# Patient Record
Sex: FEMALE | Race: BLACK OR AFRICAN AMERICAN | Employment: PART TIME | URBAN - METROPOLITAN AREA
[De-identification: names, ages, dates, MRNs, and addresses within clinical notes are randomized per-mention and may not be internally consistent; named-entity substitution may affect disease eponyms.]

---

## 2019-06-23 ENCOUNTER — HOSPITAL ENCOUNTER (EMERGENCY)
Age: 39
Discharge: HOME | End: 2019-06-23
Payer: COMMERCIAL

## 2019-06-23 VITALS
SYSTOLIC BLOOD PRESSURE: 127 MMHG | OXYGEN SATURATION: 98 % | HEART RATE: 63 BPM | TEMPERATURE: 98.3 F | HEIGHT: 67 IN | DIASTOLIC BLOOD PRESSURE: 78 MMHG | WEIGHT: 217.59 LBS | RESPIRATION RATE: 16 BRPM | BODY MASS INDEX: 34.15 KG/M2

## 2019-06-23 DIAGNOSIS — L73.2 HIDRADENITIS AXILLARIS: Primary | ICD-10-CM

## 2019-06-23 PROCEDURE — 4500000022 HC ED LEVEL 2 PROCEDURE

## 2019-06-23 PROCEDURE — 99282 EMERGENCY DEPT VISIT SF MDM: CPT

## 2019-06-23 PROCEDURE — 6370000000 HC RX 637 (ALT 250 FOR IP): Performed by: NURSE PRACTITIONER

## 2019-06-23 RX ORDER — OXYCODONE HYDROCHLORIDE AND ACETAMINOPHEN 5; 325 MG/1; MG/1
2 TABLET ORAL ONCE
Status: COMPLETED | OUTPATIENT
Start: 2019-06-23 | End: 2019-06-23

## 2019-06-23 RX ORDER — DOXYCYCLINE HYCLATE 100 MG
100 TABLET ORAL ONCE
Status: COMPLETED | OUTPATIENT
Start: 2019-06-23 | End: 2019-06-23

## 2019-06-23 RX ORDER — NAPROXEN 500 MG/1
500 TABLET ORAL 2 TIMES DAILY
Qty: 60 TABLET | Refills: 0 | Status: SHIPPED | OUTPATIENT
Start: 2019-06-23 | End: 2019-12-23

## 2019-06-23 RX ORDER — CLINDAMYCIN PHOSPHATE 10 MG/G
GEL TOPICAL
Qty: 1 BOTTLE | Refills: 0 | Status: SHIPPED | OUTPATIENT
Start: 2019-06-23 | End: 2019-06-30

## 2019-06-23 RX ORDER — OXYCODONE HYDROCHLORIDE AND ACETAMINOPHEN 5; 325 MG/1; MG/1
1 TABLET ORAL EVERY 6 HOURS PRN
Qty: 6 TABLET | Refills: 0 | Status: SHIPPED | OUTPATIENT
Start: 2019-06-23 | End: 2019-06-26

## 2019-06-23 RX ORDER — DOXYCYCLINE 100 MG/1
100 TABLET ORAL 2 TIMES DAILY
Qty: 20 TABLET | Refills: 0 | Status: SHIPPED | OUTPATIENT
Start: 2019-06-23 | End: 2019-07-03

## 2019-06-23 RX ADMIN — DOXYCYCLINE HYCLATE 100 MG: 100 TABLET, COATED ORAL at 20:07

## 2019-06-23 RX ADMIN — OXYCODONE HYDROCHLORIDE AND ACETAMINOPHEN 2 TABLET: 5; 325 TABLET ORAL at 20:07

## 2019-06-23 ASSESSMENT — PAIN DESCRIPTION - ORIENTATION: ORIENTATION: LEFT

## 2019-06-23 ASSESSMENT — PAIN SCALES - GENERAL
PAINLEVEL_OUTOF10: 7

## 2019-06-23 ASSESSMENT — PAIN DESCRIPTION - LOCATION: LOCATION: ARM

## 2019-06-23 ASSESSMENT — PAIN - FUNCTIONAL ASSESSMENT: PAIN_FUNCTIONAL_ASSESSMENT: 0-10

## 2019-06-23 ASSESSMENT — PAIN DESCRIPTION - DESCRIPTORS: DESCRIPTORS: BURNING;ACHING

## 2019-06-23 ASSESSMENT — PAIN DESCRIPTION - PAIN TYPE: TYPE: ACUTE PAIN

## 2019-06-23 ASSESSMENT — PAIN DESCRIPTION - FREQUENCY: FREQUENCY: CONTINUOUS

## 2019-06-24 NOTE — ED PROVIDER NOTES
[facility] [address] [id number]  Dept: [phone]  Loc: [address] ENCOUNTER        This patient was not seen or evaluated by the attending physician. I evaluated this patient, the attending physician was available for consultation. CHIEF COMPLAINT    Chief Complaint   Patient presents with    Abscess     Started with one abscess under left axilla 1 week prior, draining, now has multiple abscess and are very painful. Denies drainage at this time. Denies any fevers       HPI    Alaina Medina is a 35 y.o. female who presents with an area of pain, redness, and swelling localized in the left axilla. Onset was 1 week ago, first noted 1 abscess which spontaneously opened and drained and has since resolved. She states however there have been multiple small abscesses surrounding the area of the original abscess. The duration has been constant since the onset. The quality is sharp. There are no alleviating factors. She denies any fevers or chills, nausea or vomiting. She states that she is not immunocompromise. States that she came to the ED for further evaluation and treatment. REVIEW OF SYSTEMS    Skin: Painful skin lesion as mentioned above  General: No fevers or chills    PAST MEDICAL & SURGICAL HISTORY    Past Medical History:   Diagnosis Date    Asthma      History reviewed. No pertinent surgical history. CURRENT MEDICATIONS  (may include discharge medications prescribed in the ED)  Current Outpatient Rx   Medication Sig Dispense Refill    doxycycline monohydrate (ADOXA) 100 MG tablet Take 1 tablet by mouth 2 times daily for 10 days May substitute another form of Doxycycline if insurance requires. 20 tablet 0    clindamycin (CLEOCIN-T) 1 % gel Place topically 2 times daily to left axilla.  1 Bottle 0    oxyCODONE-acetaminophen (PERCOCET) 5-325 MG per tablet Take 1 tablet by mouth every 6 hours as needed for Pain for up to 3 days. Intended supply: 3 days. Take lowest dose possible to manage pain 6 tablet 0    naproxen (NAPROSYN) 500 MG tablet Take 1 tablet by mouth 2 times daily 60 tablet 0       ALLERGIES    Allergies   Allergen Reactions    Codeine Hives    Pcn [Penicillins] Hives       SOCIAL & FAMILY HISTORY    Social History     Socioeconomic History    Marital status: Single     Spouse name: None    Number of children: None    Years of education: None    Highest education level: None   Occupational History    None   Social Needs    Financial resource strain: None    Food insecurity:     Worry: None     Inability: None    Transportation needs:     Medical: None     Non-medical: None   Tobacco Use    Smoking status: Never Smoker    Smokeless tobacco: Never Used   Substance and Sexual Activity    Alcohol use: Yes     Comment: socially    Drug use: No    Sexual activity: None   Lifestyle    Physical activity:     Days per week: None     Minutes per session: None    Stress: None   Relationships    Social connections:     Talks on phone: None     Gets together: None     Attends Judaism service: None     Active member of club or organization: None     Attends meetings of clubs or organizations: None     Relationship status: None    Intimate partner violence:     Fear of current or ex partner: None     Emotionally abused: None     Physically abused: None     Forced sexual activity: None   Other Topics Concern    None   Social History Narrative    None     History reviewed. No pertinent family history.     PHYSICAL EXAM    VITAL SIGNS: /78   Pulse 63   Temp 98.3 °F (36.8 °C) (Oral)   Resp 16   Ht 5' 7\" (1.702 m)   Wt 217 lb 9.5 oz (98.7 kg)   LMP 06/06/2019   SpO2 98%   BMI 34.08 kg/m²   Constitutional:  Well developed, well nourished, no acute distress  HENT:  Atraumatic, no facial or lip swelling  Oral:  No tongue swelling, airway patent  Neck: Supple, no swelling  Respiratory:  No respiratory distress, breathing comfortably  Cardiovascular:  no JVD   Musculoskeletal:  No edema, no acute deformities  Integument:  + 3 cm area of tenderness, induration, with no fluctuance located in the left axilla with surrounding erythema    RADIOLOGY  No orders to display       PROCEDURE  Incision and Drainage Procedure Note  Is not recommended at this time given the location and no specific fluctuance noted. ED COURSE & MEDICAL DECISION MAKING    See chart for details of medications prescribed. Vitals:    06/23/19 1946   BP: 127/78   Pulse: 63   Resp: 16   Temp: 98.3 °F (36.8 °C)   TempSrc: Oral   SpO2: 98%   Weight: 217 lb 9.5 oz (98.7 kg)   Height: 5' 7\" (1.702 m)       Differential diagnosis: necrotizing fasciitis, deep space soft tissue bacterial skin infection, viral rash, systemic infectious rash, aseptic cyst, malignancy, lymphadenopathy, other    Patient is afebrile and nontoxic in appearance. Doxycycline prescribed as well as clindamycin cream.    I instructed the patient to follow up in 2 days for wound recheck and referral to general surgeon. I instructed the patient to return to the ED immediately for any new or worsening symptoms. The patient verbalized understanding. FINAL IMPRESSION    1.  Hidradenitis axillaris        PLAN  Discharge with close outpatient follow-up (see EMR)       (Please note that this note was completed with a voice recognition program.  Every attempt was made to edit the dictations, but inevitably there remain words that are mis-transcribed.)        ANIKA Adams - ANETA  06/23/19 2014

## 2019-12-23 ENCOUNTER — HOSPITAL ENCOUNTER (EMERGENCY)
Age: 39
Discharge: HOME | End: 2019-12-23
Attending: EMERGENCY MEDICINE
Payer: COMMERCIAL

## 2019-12-23 ENCOUNTER — APPOINTMENT (OUTPATIENT)
Dept: CT IMAGING | Age: 39
End: 2019-12-23
Payer: COMMERCIAL

## 2019-12-23 VITALS
TEMPERATURE: 98.5 F | WEIGHT: 217.81 LBS | SYSTOLIC BLOOD PRESSURE: 117 MMHG | RESPIRATION RATE: 16 BRPM | BODY MASS INDEX: 34.19 KG/M2 | HEIGHT: 67 IN | HEART RATE: 71 BPM | OXYGEN SATURATION: 99 % | DIASTOLIC BLOOD PRESSURE: 71 MMHG

## 2019-12-23 DIAGNOSIS — K62.5 RECTAL BLEEDING: Primary | ICD-10-CM

## 2019-12-23 DIAGNOSIS — N30.01 ACUTE CYSTITIS WITH HEMATURIA: ICD-10-CM

## 2019-12-23 LAB
A/G RATIO: 1.3 (ref 1.1–2.2)
ALBUMIN SERPL-MCNC: 3.9 G/DL (ref 3.4–5)
ALP BLD-CCNC: 53 U/L (ref 40–129)
ALT SERPL-CCNC: 10 U/L (ref 10–40)
ANION GAP SERPL CALCULATED.3IONS-SCNC: 8 MMOL/L (ref 3–16)
AST SERPL-CCNC: 21 U/L (ref 15–37)
BACTERIA: ABNORMAL /HPF
BASOPHILS ABSOLUTE: 0.1 K/UL (ref 0–0.2)
BASOPHILS RELATIVE PERCENT: 0.7 %
BILIRUB SERPL-MCNC: 0.3 MG/DL (ref 0–1)
BILIRUBIN URINE: NEGATIVE
BLOOD, URINE: ABNORMAL
BUN BLDV-MCNC: 12 MG/DL (ref 7–20)
CALCIUM SERPL-MCNC: 9.3 MG/DL (ref 8.3–10.6)
CHLORIDE BLD-SCNC: 110 MMOL/L (ref 99–110)
CLARITY: ABNORMAL
CO2: 23 MMOL/L (ref 21–32)
COLOR: YELLOW
CREAT SERPL-MCNC: 0.9 MG/DL (ref 0.6–1.1)
EOSINOPHILS ABSOLUTE: 0.2 K/UL (ref 0–0.6)
EOSINOPHILS RELATIVE PERCENT: 2.5 %
EPITHELIAL CELLS, UA: ABNORMAL /HPF
GFR AFRICAN AMERICAN: >60
GFR NON-AFRICAN AMERICAN: >60
GLOBULIN: 3 G/DL
GLUCOSE BLD-MCNC: 99 MG/DL (ref 70–99)
GLUCOSE URINE: NEGATIVE MG/DL
HCG(URINE) PREGNANCY TEST: NEGATIVE
HCT VFR BLD CALC: 34.9 % (ref 36–48)
HEMOGLOBIN: 11.7 G/DL (ref 12–16)
KETONES, URINE: NEGATIVE MG/DL
LEUKOCYTE ESTERASE, URINE: ABNORMAL
LIPASE: 27 U/L (ref 13–60)
LYMPHOCYTES ABSOLUTE: 1.8 K/UL (ref 1–5.1)
LYMPHOCYTES RELATIVE PERCENT: 21.6 %
MCH RBC QN AUTO: 33.2 PG (ref 26–34)
MCHC RBC AUTO-ENTMCNC: 33.6 G/DL (ref 31–36)
MCV RBC AUTO: 98.8 FL (ref 80–100)
MICROSCOPIC EXAMINATION: YES
MONOCYTES ABSOLUTE: 1 K/UL (ref 0–1.3)
MONOCYTES RELATIVE PERCENT: 11.1 %
NEUTROPHILS ABSOLUTE: 5.5 K/UL (ref 1.7–7.7)
NEUTROPHILS RELATIVE PERCENT: 64.1 %
NITRITE, URINE: NEGATIVE
OCCULT BLOOD DIAGNOSTIC: ABNORMAL
PDW BLD-RTO: 13.4 % (ref 12.4–15.4)
PH UA: 7.5 (ref 5–8)
PLATELET # BLD: 233 K/UL (ref 135–450)
PMV BLD AUTO: 8.4 FL (ref 5–10.5)
POTASSIUM REFLEX MAGNESIUM: 4.1 MMOL/L (ref 3.5–5.1)
PROTEIN UA: ABNORMAL MG/DL
RBC # BLD: 3.53 M/UL (ref 4–5.2)
RBC UA: ABNORMAL /HPF (ref 0–2)
SODIUM BLD-SCNC: 141 MMOL/L (ref 136–145)
SPECIFIC GRAVITY UA: 1.02 (ref 1–1.03)
TOTAL PROTEIN: 6.9 G/DL (ref 6.4–8.2)
TRICHOMONAS: ABNORMAL /HPF
URINE REFLEX TO CULTURE: YES
URINE TYPE: ABNORMAL
UROBILINOGEN, URINE: 1 E.U./DL
WBC # BLD: 8.6 K/UL (ref 4–11)
WBC UA: >100 /HPF (ref 0–5)

## 2019-12-23 PROCEDURE — 87086 URINE CULTURE/COLONY COUNT: CPT

## 2019-12-23 PROCEDURE — 84703 CHORIONIC GONADOTROPIN ASSAY: CPT

## 2019-12-23 PROCEDURE — 96361 HYDRATE IV INFUSION ADD-ON: CPT

## 2019-12-23 PROCEDURE — 6360000004 HC RX CONTRAST MEDICATION: Performed by: EMERGENCY MEDICINE

## 2019-12-23 PROCEDURE — G0328 FECAL BLOOD SCRN IMMUNOASSAY: HCPCS

## 2019-12-23 PROCEDURE — 85025 COMPLETE CBC W/AUTO DIFF WBC: CPT

## 2019-12-23 PROCEDURE — 6370000000 HC RX 637 (ALT 250 FOR IP): Performed by: EMERGENCY MEDICINE

## 2019-12-23 PROCEDURE — 83690 ASSAY OF LIPASE: CPT

## 2019-12-23 PROCEDURE — 96374 THER/PROPH/DIAG INJ IV PUSH: CPT

## 2019-12-23 PROCEDURE — 74177 CT ABD & PELVIS W/CONTRAST: CPT

## 2019-12-23 PROCEDURE — 81001 URINALYSIS AUTO W/SCOPE: CPT

## 2019-12-23 PROCEDURE — 36415 COLL VENOUS BLD VENIPUNCTURE: CPT

## 2019-12-23 PROCEDURE — 2580000003 HC RX 258: Performed by: EMERGENCY MEDICINE

## 2019-12-23 PROCEDURE — 6360000002 HC RX W HCPCS: Performed by: EMERGENCY MEDICINE

## 2019-12-23 PROCEDURE — 99284 EMERGENCY DEPT VISIT MOD MDM: CPT

## 2019-12-23 PROCEDURE — 80053 COMPREHEN METABOLIC PANEL: CPT

## 2019-12-23 RX ORDER — CIPROFLOXACIN 500 MG/1
500 TABLET, FILM COATED ORAL ONCE
Status: COMPLETED | OUTPATIENT
Start: 2019-12-23 | End: 2019-12-23

## 2019-12-23 RX ORDER — ONDANSETRON 4 MG/1
4 TABLET, ORALLY DISINTEGRATING ORAL EVERY 8 HOURS PRN
Qty: 20 TABLET | Refills: 0 | Status: ON HOLD | OUTPATIENT
Start: 2019-12-23 | End: 2022-04-10

## 2019-12-23 RX ORDER — 0.9 % SODIUM CHLORIDE 0.9 %
500 INTRAVENOUS SOLUTION INTRAVENOUS ONCE
Status: COMPLETED | OUTPATIENT
Start: 2019-12-23 | End: 2019-12-23

## 2019-12-23 RX ORDER — ONDANSETRON 2 MG/ML
4 INJECTION INTRAMUSCULAR; INTRAVENOUS ONCE
Status: COMPLETED | OUTPATIENT
Start: 2019-12-23 | End: 2019-12-23

## 2019-12-23 RX ORDER — DICYCLOMINE HYDROCHLORIDE 10 MG/1
10 CAPSULE ORAL EVERY 6 HOURS PRN
Qty: 20 CAPSULE | Refills: 0 | Status: ON HOLD | OUTPATIENT
Start: 2019-12-23 | End: 2022-04-10

## 2019-12-23 RX ORDER — CIPROFLOXACIN 500 MG/1
500 TABLET, FILM COATED ORAL 2 TIMES DAILY
Qty: 14 TABLET | Refills: 0 | Status: SHIPPED | OUTPATIENT
Start: 2019-12-23 | End: 2019-12-30

## 2019-12-23 RX ADMIN — SODIUM CHLORIDE 500 ML: 9 INJECTION, SOLUTION INTRAVENOUS at 21:36

## 2019-12-23 RX ADMIN — IOVERSOL 100 ML: 678 INJECTION INTRA-ARTERIAL; INTRAVENOUS at 22:35

## 2019-12-23 RX ADMIN — CIPROFLOXACIN 500 MG: 500 TABLET, FILM COATED ORAL at 22:48

## 2019-12-23 RX ADMIN — ONDANSETRON 4 MG: 2 INJECTION INTRAMUSCULAR; INTRAVENOUS at 21:36

## 2019-12-23 ASSESSMENT — PAIN DESCRIPTION - DESCRIPTORS: DESCRIPTORS: CRAMPING

## 2019-12-23 ASSESSMENT — PAIN DESCRIPTION - LOCATION
LOCATION: ABDOMEN
LOCATION: ABDOMEN

## 2019-12-23 ASSESSMENT — PAIN DESCRIPTION - FREQUENCY: FREQUENCY: CONTINUOUS

## 2019-12-24 ASSESSMENT — ENCOUNTER SYMPTOMS
VOMITING: 0
BLOOD IN STOOL: 1
COUGH: 0
ABDOMINAL PAIN: 0
TROUBLE SWALLOWING: 0
COLOR CHANGE: 0
NAUSEA: 1
SHORTNESS OF BREATH: 0
PHOTOPHOBIA: 0
FACIAL SWELLING: 0
DIARRHEA: 0
CONSTIPATION: 0

## 2019-12-25 LAB — URINE CULTURE, ROUTINE: NORMAL

## 2022-04-09 ENCOUNTER — APPOINTMENT (OUTPATIENT)
Dept: CT IMAGING | Age: 42
DRG: 231 | End: 2022-04-09
Payer: COMMERCIAL

## 2022-04-09 ENCOUNTER — HOSPITAL ENCOUNTER (INPATIENT)
Age: 42
LOS: 10 days | Discharge: HOME HEALTH | DRG: 231 | End: 2022-04-19
Attending: EMERGENCY MEDICINE | Admitting: INTERNAL MEDICINE
Payer: COMMERCIAL

## 2022-04-09 DIAGNOSIS — C18.9 ADENOCARCINOMA OF COLON METASTATIC TO LIVER (HCC): ICD-10-CM

## 2022-04-09 DIAGNOSIS — R10.84 GENERALIZED ABDOMINAL PAIN: Primary | ICD-10-CM

## 2022-04-09 DIAGNOSIS — R93.5 ABNORMAL CT OF THE ABDOMEN: ICD-10-CM

## 2022-04-09 DIAGNOSIS — C78.7 ADENOCARCINOMA OF COLON METASTATIC TO LIVER (HCC): ICD-10-CM

## 2022-04-09 PROBLEM — K92.2 LOWER GI BLEED: Status: ACTIVE | Noted: 2022-04-09

## 2022-04-09 PROBLEM — K63.89 COLONIC MASS: Status: ACTIVE | Noted: 2022-04-09

## 2022-04-09 PROBLEM — K76.9 LIVER LESION: Status: ACTIVE | Noted: 2022-04-09

## 2022-04-09 PROBLEM — A59.9 TRICHIMONIASIS: Status: ACTIVE | Noted: 2022-04-09

## 2022-04-09 LAB
A/G RATIO: 1.3 (ref 1.1–2.2)
ALBUMIN SERPL-MCNC: 3.9 G/DL (ref 3.4–5)
ALP BLD-CCNC: 77 U/L (ref 40–129)
ALT SERPL-CCNC: 25 U/L (ref 10–40)
ANION GAP SERPL CALCULATED.3IONS-SCNC: 16 MMOL/L (ref 3–16)
AST SERPL-CCNC: 53 U/L (ref 15–37)
BACTERIA WET PREP: ABNORMAL
BACTERIA: ABNORMAL /HPF
BASOPHILS ABSOLUTE: 0 K/UL (ref 0–0.2)
BASOPHILS RELATIVE PERCENT: 0.4 %
BILIRUB SERPL-MCNC: 0.6 MG/DL (ref 0–1)
BILIRUBIN URINE: NEGATIVE
BLOOD, URINE: ABNORMAL
BUN BLDV-MCNC: 12 MG/DL (ref 7–20)
CALCIUM SERPL-MCNC: 9.9 MG/DL (ref 8.3–10.6)
CHLORIDE BLD-SCNC: 102 MMOL/L (ref 99–110)
CLARITY: CLEAR
CLUE CELLS: ABNORMAL
CO2: 20 MMOL/L (ref 21–32)
COLOR: YELLOW
CREAT SERPL-MCNC: 0.9 MG/DL (ref 0.6–1.1)
EOSINOPHILS ABSOLUTE: 0.2 K/UL (ref 0–0.6)
EOSINOPHILS RELATIVE PERCENT: 1.3 %
EPITHELIAL CELLS WET PREP: ABNORMAL
EPITHELIAL CELLS, UA: ABNORMAL /HPF (ref 0–5)
GFR AFRICAN AMERICAN: >60
GFR NON-AFRICAN AMERICAN: >60
GLUCOSE BLD-MCNC: 98 MG/DL (ref 70–99)
GLUCOSE URINE: NEGATIVE MG/DL
HCG QUALITATIVE: NEGATIVE
HCT VFR BLD CALC: 34.9 % (ref 36–48)
HEMATOLOGY PATH CONSULT: YES
HEMOGLOBIN: 11.8 G/DL (ref 12–16)
KETONES, URINE: NEGATIVE MG/DL
LEUKOCYTE ESTERASE, URINE: ABNORMAL
LIPASE: 16 U/L (ref 13–60)
LYMPHOCYTES ABSOLUTE: 1.6 K/UL (ref 1–5.1)
LYMPHOCYTES RELATIVE PERCENT: 12.8 %
MCH RBC QN AUTO: 32.3 PG (ref 26–34)
MCHC RBC AUTO-ENTMCNC: 33.7 G/DL (ref 31–36)
MCV RBC AUTO: 95.9 FL (ref 80–100)
MICROSCOPIC EXAMINATION: YES
MONOCYTES ABSOLUTE: 1.7 K/UL (ref 0–1.3)
MONOCYTES RELATIVE PERCENT: 13.5 %
MUCUS: ABNORMAL /LPF
NEUTROPHILS ABSOLUTE: 9.2 K/UL (ref 1.7–7.7)
NEUTROPHILS RELATIVE PERCENT: 72 %
NITRITE, URINE: NEGATIVE
OCCULT BLOOD DIAGNOSTIC: ABNORMAL
PDW BLD-RTO: 13.2 % (ref 12.4–15.4)
PH UA: 7 (ref 5–8)
PLATELET # BLD: 286 K/UL (ref 135–450)
PMV BLD AUTO: 8.2 FL (ref 5–10.5)
POTASSIUM REFLEX MAGNESIUM: 4 MMOL/L (ref 3.5–5.1)
PROTEIN UA: ABNORMAL MG/DL
RBC # BLD: 3.64 M/UL (ref 4–5.2)
RBC UA: ABNORMAL /HPF (ref 0–4)
RBC WET PREP: ABNORMAL
SARS-COV-2, NAAT: NOT DETECTED
SODIUM BLD-SCNC: 138 MMOL/L (ref 136–145)
SOURCE WET PREP: ABNORMAL
SPECIFIC GRAVITY UA: 1.02 (ref 1–1.03)
TOTAL PROTEIN: 7 G/DL (ref 6.4–8.2)
TRICHOMONAS PREP: ABNORMAL
TRICHOMONAS: PRESENT /HPF
URINE REFLEX TO CULTURE: YES
URINE TYPE: ABNORMAL
UROBILINOGEN, URINE: 1 E.U./DL
WBC # BLD: 12.8 K/UL (ref 4–11)
WBC UA: ABNORMAL /HPF (ref 0–5)
WBC WET PREP: ABNORMAL
YEAST WET PREP: ABNORMAL

## 2022-04-09 PROCEDURE — 6370000000 HC RX 637 (ALT 250 FOR IP): Performed by: INTERNAL MEDICINE

## 2022-04-09 PROCEDURE — 87635 SARS-COV-2 COVID-19 AMP PRB: CPT

## 2022-04-09 PROCEDURE — 74176 CT ABD & PELVIS W/O CONTRAST: CPT

## 2022-04-09 PROCEDURE — 87077 CULTURE AEROBIC IDENTIFY: CPT

## 2022-04-09 PROCEDURE — 36415 COLL VENOUS BLD VENIPUNCTURE: CPT

## 2022-04-09 PROCEDURE — 87491 CHLMYD TRACH DNA AMP PROBE: CPT

## 2022-04-09 PROCEDURE — 81001 URINALYSIS AUTO W/SCOPE: CPT

## 2022-04-09 PROCEDURE — 80053 COMPREHEN METABOLIC PANEL: CPT

## 2022-04-09 PROCEDURE — 96375 TX/PRO/DX INJ NEW DRUG ADDON: CPT

## 2022-04-09 PROCEDURE — 99284 EMERGENCY DEPT VISIT MOD MDM: CPT

## 2022-04-09 PROCEDURE — 87210 SMEAR WET MOUNT SALINE/INK: CPT

## 2022-04-09 PROCEDURE — 6370000000 HC RX 637 (ALT 250 FOR IP): Performed by: NURSE PRACTITIONER

## 2022-04-09 PROCEDURE — 87591 N.GONORRHOEAE DNA AMP PROB: CPT

## 2022-04-09 PROCEDURE — 96365 THER/PROPH/DIAG IV INF INIT: CPT

## 2022-04-09 PROCEDURE — 2580000003 HC RX 258: Performed by: EMERGENCY MEDICINE

## 2022-04-09 PROCEDURE — 6360000002 HC RX W HCPCS: Performed by: INTERNAL MEDICINE

## 2022-04-09 PROCEDURE — 6360000002 HC RX W HCPCS: Performed by: EMERGENCY MEDICINE

## 2022-04-09 PROCEDURE — G0328 FECAL BLOOD SCRN IMMUNOASSAY: HCPCS

## 2022-04-09 PROCEDURE — 84703 CHORIONIC GONADOTROPIN ASSAY: CPT

## 2022-04-09 PROCEDURE — 85025 COMPLETE CBC W/AUTO DIFF WBC: CPT

## 2022-04-09 PROCEDURE — 87086 URINE CULTURE/COLONY COUNT: CPT

## 2022-04-09 PROCEDURE — 83690 ASSAY OF LIPASE: CPT

## 2022-04-09 PROCEDURE — 2580000003 HC RX 258: Performed by: INTERNAL MEDICINE

## 2022-04-09 PROCEDURE — 1200000000 HC SEMI PRIVATE

## 2022-04-09 RX ORDER — DICYCLOMINE HYDROCHLORIDE 10 MG/1
10 CAPSULE ORAL EVERY 6 HOURS PRN
Status: DISCONTINUED | OUTPATIENT
Start: 2022-04-09 | End: 2022-04-09

## 2022-04-09 RX ORDER — ACETAMINOPHEN 650 MG/1
650 SUPPOSITORY RECTAL EVERY 6 HOURS PRN
Status: DISCONTINUED | OUTPATIENT
Start: 2022-04-09 | End: 2022-04-19 | Stop reason: HOSPADM

## 2022-04-09 RX ORDER — DOXYCYCLINE HYCLATE 100 MG
100 TABLET ORAL EVERY 12 HOURS SCHEDULED
Status: DISCONTINUED | OUTPATIENT
Start: 2022-04-10 | End: 2022-04-18

## 2022-04-09 RX ORDER — KETOROLAC TROMETHAMINE 30 MG/ML
15 INJECTION, SOLUTION INTRAMUSCULAR; INTRAVENOUS ONCE
Status: COMPLETED | OUTPATIENT
Start: 2022-04-09 | End: 2022-04-09

## 2022-04-09 RX ORDER — ONDANSETRON 2 MG/ML
4 INJECTION INTRAMUSCULAR; INTRAVENOUS ONCE
Status: COMPLETED | OUTPATIENT
Start: 2022-04-09 | End: 2022-04-09

## 2022-04-09 RX ORDER — SODIUM CHLORIDE 0.9 % (FLUSH) 0.9 %
5-40 SYRINGE (ML) INJECTION EVERY 12 HOURS SCHEDULED
Status: DISCONTINUED | OUTPATIENT
Start: 2022-04-09 | End: 2022-04-19 | Stop reason: HOSPADM

## 2022-04-09 RX ORDER — ACETAMINOPHEN 325 MG/1
650 TABLET ORAL EVERY 6 HOURS PRN
Status: DISCONTINUED | OUTPATIENT
Start: 2022-04-09 | End: 2022-04-19 | Stop reason: HOSPADM

## 2022-04-09 RX ORDER — DICYCLOMINE HYDROCHLORIDE 10 MG/1
20 CAPSULE ORAL EVERY 6 HOURS PRN
Status: DISCONTINUED | OUTPATIENT
Start: 2022-04-09 | End: 2022-04-19 | Stop reason: HOSPADM

## 2022-04-09 RX ORDER — SODIUM CHLORIDE 9 MG/ML
INJECTION, SOLUTION INTRAVENOUS CONTINUOUS
Status: DISCONTINUED | OUTPATIENT
Start: 2022-04-09 | End: 2022-04-12

## 2022-04-09 RX ORDER — SODIUM CHLORIDE 0.9 % (FLUSH) 0.9 %
5-40 SYRINGE (ML) INJECTION PRN
Status: DISCONTINUED | OUTPATIENT
Start: 2022-04-09 | End: 2022-04-19 | Stop reason: HOSPADM

## 2022-04-09 RX ORDER — ONDANSETRON 2 MG/ML
4 INJECTION INTRAMUSCULAR; INTRAVENOUS EVERY 6 HOURS PRN
Status: DISCONTINUED | OUTPATIENT
Start: 2022-04-09 | End: 2022-04-19 | Stop reason: HOSPADM

## 2022-04-09 RX ORDER — METRONIDAZOLE 500 MG/1
500 TABLET ORAL EVERY 12 HOURS SCHEDULED
Status: DISPENSED | OUTPATIENT
Start: 2022-04-09 | End: 2022-04-16

## 2022-04-09 RX ORDER — ONDANSETRON 4 MG/1
4 TABLET, ORALLY DISINTEGRATING ORAL EVERY 8 HOURS PRN
Status: DISCONTINUED | OUTPATIENT
Start: 2022-04-09 | End: 2022-04-09

## 2022-04-09 RX ORDER — 0.9 % SODIUM CHLORIDE 0.9 %
1000 INTRAVENOUS SOLUTION INTRAVENOUS ONCE
Status: COMPLETED | OUTPATIENT
Start: 2022-04-09 | End: 2022-04-09

## 2022-04-09 RX ORDER — SODIUM CHLORIDE 9 MG/ML
INJECTION, SOLUTION INTRAVENOUS PRN
Status: DISCONTINUED | OUTPATIENT
Start: 2022-04-09 | End: 2022-04-19 | Stop reason: HOSPADM

## 2022-04-09 RX ORDER — ONDANSETRON 4 MG/1
4 TABLET, ORALLY DISINTEGRATING ORAL EVERY 8 HOURS PRN
Status: DISCONTINUED | OUTPATIENT
Start: 2022-04-09 | End: 2022-04-19 | Stop reason: HOSPADM

## 2022-04-09 RX ADMIN — CEFTRIAXONE 1000 MG: 1 INJECTION, POWDER, FOR SOLUTION INTRAMUSCULAR; INTRAVENOUS at 14:12

## 2022-04-09 RX ADMIN — ONDANSETRON 4 MG: 2 INJECTION INTRAMUSCULAR; INTRAVENOUS at 18:39

## 2022-04-09 RX ADMIN — ONDANSETRON 4 MG: 2 INJECTION INTRAMUSCULAR; INTRAVENOUS at 11:44

## 2022-04-09 RX ADMIN — METRONIDAZOLE 500 MG: 500 TABLET ORAL at 21:40

## 2022-04-09 RX ADMIN — SODIUM CHLORIDE 1000 ML: 9 INJECTION, SOLUTION INTRAVENOUS at 11:51

## 2022-04-09 RX ADMIN — ACETAMINOPHEN 650 MG: 325 TABLET ORAL at 20:17

## 2022-04-09 RX ADMIN — KETOROLAC TROMETHAMINE 15 MG: 30 INJECTION, SOLUTION INTRAMUSCULAR at 11:45

## 2022-04-09 RX ADMIN — SODIUM CHLORIDE, PRESERVATIVE FREE 10 ML: 5 INJECTION INTRAVENOUS at 21:52

## 2022-04-09 RX ADMIN — SODIUM CHLORIDE: 9 INJECTION, SOLUTION INTRAVENOUS at 21:35

## 2022-04-09 RX ADMIN — ONDANSETRON 4 MG: 2 INJECTION INTRAMUSCULAR; INTRAVENOUS at 21:40

## 2022-04-09 RX ADMIN — DICYCLOMINE HYDROCHLORIDE 20 MG: 10 CAPSULE ORAL at 21:40

## 2022-04-09 ASSESSMENT — ENCOUNTER SYMPTOMS
EYE PAIN: 0
VOMITING: 1
ABDOMINAL PAIN: 1
NAUSEA: 1
SHORTNESS OF BREATH: 0
BACK PAIN: 0
COUGH: 0
WHEEZING: 0
SORE THROAT: 0
DIARRHEA: 0
RHINORRHEA: 0
EYE DISCHARGE: 0

## 2022-04-09 ASSESSMENT — PAIN SCALES - GENERAL
PAINLEVEL_OUTOF10: 2
PAINLEVEL_OUTOF10: 6
PAINLEVEL_OUTOF10: 8
PAINLEVEL_OUTOF10: 8
PAINLEVEL_OUTOF10: 4

## 2022-04-09 ASSESSMENT — PAIN DESCRIPTION - PAIN TYPE: TYPE: ACUTE PAIN

## 2022-04-09 ASSESSMENT — PAIN - FUNCTIONAL ASSESSMENT: PAIN_FUNCTIONAL_ASSESSMENT: 0-10

## 2022-04-09 ASSESSMENT — PAIN DESCRIPTION - DESCRIPTORS: DESCRIPTORS: CRAMPING

## 2022-04-09 ASSESSMENT — PAIN DESCRIPTION - LOCATION: LOCATION: ABDOMEN

## 2022-04-09 NOTE — ED PROVIDER NOTES
[address] [facility]  eMERGENCY dEPARTMENT eNCOUnter        Pt Name: John Reagan  MRN: [id number]  Kvnggfserafin 1980  Date of evaluation: 4/9/2022  Provider: Aldo Foley MD  PCP: No primary care provider on file. CHIEF COMPLAINT       Chief Complaint   Patient presents with    Abdominal Pain     generalized abdominal pain x2 weeks, patient reports some blood in her stool, also reports nausea and emesis. Arrives to ED with temperature of 100.2. Patietn reports increased abdominal pain with urination. HISTORY OFPRESENT ILLNESS   (Location/Symptom, Timing/Onset, Context/Setting, Quality, Duration, Modifying Factors,Severity)  Note limiting factors. John Reagan is a 36 y.o. female       Location/Symptom: Generalized abdominal pain  Timing/Onset: 2 weeks  Context/Setting: Patient does state that she has had heartburn over the years. Started having more of a generalized abdominal pain over the past 2 weeks. Got much worse in the last day or so and then last night she started having nausea and vomiting. Quality: Pain is sharp and crampy  Duration: At this point is mostly constant. It was coming and going initially  Modifying Factors: States it does hurt when she walks and hurts when she takes a deep breath. Pain is very much generalized and not located in any 1 quadrant. She also states she has had some mucus-like bloody stool. Last night she was having chills. Severity: 8 out of 10    Nursing Noteswere all reviewed and agreed with or any disagreements were addressed  in the HPI. REVIEW OF SYSTEMS    (2-9 systems for level 4, 10 or more for level 5)     Review of Systems   Constitutional: Negative for chills, fatigue and fever. HENT: Negative for ear pain, rhinorrhea and sore throat. Eyes: Negative for pain, discharge and visual disturbance. Respiratory: Negative for cough, shortness of breath and wheezing.     Cardiovascular: Negative for chest pain, palpitations and leg swelling. Gastrointestinal: Positive for abdominal pain, nausea and vomiting. Negative for diarrhea. Genitourinary: Negative for difficulty urinating, dysuria, pelvic pain and vaginal discharge. Musculoskeletal: Negative for arthralgias, back pain, joint swelling and neck pain. Skin: Negative for rash. Allergic/Immunologic: Negative for environmental allergies. Neurological: Negative for dizziness, seizures, syncope and headaches. Hematological: Negative for adenopathy. Psychiatric/Behavioral: Negative for dysphoric mood and suicidal ideas. The patient is not nervous/anxious. PAST MEDICAL HISTORY     Past Medical History:   Diagnosis Date    Asthma          SURGICAL HISTORY   History reviewed. No pertinent surgical history. CURRENTMEDICATIONS       Previous Medications    DICYCLOMINE (BENTYL) 10 MG CAPSULE    Take 1 capsule by mouth every 6 hours as needed (cramps)    HYDROCORTISONE (ANUSOL-HC) 2.5 % RECTAL CREAM    Place rectally 2 times daily. ONDANSETRON (ZOFRAN ODT) 4 MG DISINTEGRATING TABLET    Take 1 tablet by mouth every 8 hours as needed for Nausea       ALLERGIES     Codeine and Pcn [penicillins]    FAMILY HISTORY     History reviewed. No pertinent family history.        SOCIAL HISTORY       Social History     Socioeconomic History    Marital status: Single     Spouse name: None    Number of children: None    Years of education: None    Highest education level: None   Occupational History    None   Tobacco Use    Smoking status: Never Smoker    Smokeless tobacco: Never Used   Substance and Sexual Activity    Alcohol use: Yes     Comment: socially    Drug use: No    Sexual activity: None   Other Topics Concern    None   Social History Narrative    None     Social Determinants of Health     Financial Resource Strain:     Difficulty of Paying Living Expenses: Not on file   Food Insecurity:     Worried About Running Out of Food in the Last Year: Not on file    Ran Out of Food in the Last Year: Not on file   Transportation Needs:     Lack of Transportation (Medical): Not on file    Lack of Transportation (Non-Medical): Not on file   Physical Activity:     Days of Exercise per Week: Not on file    Minutes of Exercise per Session: Not on file   Stress:     Feeling of Stress : Not on file   Social Connections:     Frequency of Communication with Friends and Family: Not on file    Frequency of Social Gatherings with Friends and Family: Not on file    Attends Worship Services: Not on file    Active Member of [address] [place] [address] [employer] or Organizations: Not on file    Attends Club or Organization Meetings: Not on file    Marital Status: Not on file   Intimate Partner Violence:     Fear of Current or Ex-Partner: Not on file    Emotionally Abused: Not on file    Physically Abused: Not on file    Sexually Abused: Not on file   Housing Stability:     Unable to Pay for Housing in the Last Year: Not on file    Number of [place] in the Last Year: Not on file    Unstable Housing in the Last Year: Not on file       SCREENINGS    Efra Coma Scale  Eye Opening: Spontaneous  Best Verbal Response: Oriented  Best Motor Response: Obeys commands  Efra Coma Scale Score: 15        PHYSICAL EXAM    (up to 7 for level 4, 8 or more for level 5)     ED Triage Vitals   BP Temp Temp Source Pulse Resp SpO2 Height Weight   04/09/22 1050 04/09/22 1050 04/09/22 1050 04/09/22 1050 04/09/22 1050 04/09/22 1050 04/09/22 1057 04/09/22 1057   130/82 100.2 °F (37.9 °C) Oral 90 20 100 % 5' 6\" (1.676 m) 225 lb 1.4 oz (102.1 kg)      height is 5' 6\" (1.676 m) and weight is 225 lb 1.4 oz (102.1 kg). Her oral temperature is 99.2 °F (37.3 °C). Her blood pressure is 152/56 (abnormal) and her pulse is 67. Her respiration is 18 and oxygen saturation is 100%. Physical Exam  Constitutional:       Appearance: She is well-developed. She is not diaphoretic.    HENT:      Head: Normocephalic and atraumatic. Right Ear: External ear normal.      Left Ear: External ear normal.   Eyes:      General: No scleral icterus. Right eye: No discharge. Left eye: No discharge. Neck:      Thyroid: No thyromegaly. Vascular: No JVD. Trachea: No tracheal deviation. Cardiovascular:      Rate and Rhythm: Normal rate and regular rhythm. Heart sounds: No murmur heard. No friction rub. No gallop. Pulmonary:      Effort: Pulmonary effort is normal. No respiratory distress. Breath sounds: Normal breath sounds. No stridor. No wheezing or rales. Abdominal:      General: There is no distension. Palpations: Abdomen is soft. Tenderness: There is generalized abdominal tenderness. There is rebound. There is no guarding. Comments: Rectal exam chaperoned by the emergency department nurse Delbra Bloch. Mildly tender exam.  She does have a hemorrhoid that looks somewhat active with a small amount of red blood. With the patient supine this would be at about 7:00 on the anus. The stool itself though was yellow and I did not feel a mass. Musculoskeletal:         General: No tenderness. Cervical back: Normal range of motion. Skin:     General: Skin is warm and dry. Findings: No rash (On exposed body surfaces). Neurological:      Mental Status: She is alert and oriented to person, place, and time. Coordination: Coordination normal.   Psychiatric:         Behavior: Behavior normal.         Thought Content:  Thought content normal.         DIAGNOSTIC RESULTS   LABS:    Results for orders placed or performed during the hospital encounter of 04/09/22   Wet Prep, Genital    Specimen: Vaginal   Result Value Ref Range    Trichomonas Prep None Seen     Yeast, Wet Prep None Seen     Clue Cells, Wet Prep 1+ (A)     WBC, Wet Prep <1+     RBC, Wet Prep None Seen     Epi Cells 2+     Bacteria 2+     Source Wet Prep Vaginal    COVID-19, Rapid    Specimen: Nasopharyngeal Swab Result Value Ref Range    SARS-CoV-2, NAAT Not Detected Not Detected   HCG Qualitative, Serum   Result Value Ref Range    hCG Qual Negative Detects HCG level >10 MIU/mL   CBC with Auto Differential   Result Value Ref Range    WBC 12.8 (H) 4.0 - 11.0 K/uL    RBC 3.64 (L) 4.00 - 5.20 M/uL    Hemoglobin 11.8 (L) 12.0 - 16.0 g/dL    Hematocrit 34.9 (L) 36.0 - 48.0 %    MCV 95.9 80.0 - 100.0 fL    MCH 32.3 26.0 - 34.0 pg    MCHC 33.7 31.0 - 36.0 g/dL    RDW 13.2 12.4 - 15.4 %    Platelets 552 796 - 350 K/uL    MPV 8.2 5.0 - 10.5 fL    Path Consult Yes     Neutrophils % 72.0 %    Lymphocytes % 12.8 %    Monocytes % 13.5 %    Eosinophils % 1.3 %    Basophils % 0.4 %    Neutrophils Absolute 9.2 (H) 1.7 - 7.7 K/uL    Lymphocytes Absolute 1.6 1.0 - 5.1 K/uL    Monocytes Absolute 1.7 (H) 0.0 - 1.3 K/uL    Eosinophils Absolute 0.2 0.0 - 0.6 K/uL    Basophils Absolute 0.0 0.0 - 0.2 K/uL   Comprehensive Metabolic Panel w/ Reflex to MG   Result Value Ref Range    Sodium 138 136 - 145 mmol/L    Potassium reflex Magnesium 4.0 3.5 - 5.1 mmol/L    Chloride 102 99 - 110 mmol/L    CO2 20 (L) 21 - 32 mmol/L    Anion Gap 16 3 - 16    Glucose 98 70 - 99 mg/dL    BUN 12 7 - 20 mg/dL    CREATININE 0.9 0.6 - 1.1 mg/dL    GFR Non-African American >60 >60    GFR African American >60 >60    Calcium 9.9 8.3 - 10.6 mg/dL    Total Protein 7.0 6.4 - 8.2 g/dL    Albumin 3.9 3.4 - 5.0 g/dL    Albumin/Globulin Ratio 1.3 1.1 - 2.2    Total Bilirubin 0.6 0.0 - 1.0 mg/dL    Alkaline Phosphatase 77 40 - 129 U/L    ALT 25 10 - 40 U/L    AST 53 (H) 15 - 37 U/L   Lipase   Result Value Ref Range    Lipase 16.0 13.0 - 60.0 U/L   Urinalysis with Reflex to Culture    Specimen: Urine, clean catch   Result Value Ref Range    Color, UA Yellow Straw/Yellow    Clarity, UA Clear Clear    Glucose, Ur Negative Negative mg/dL    Bilirubin Urine Negative Negative    Ketones, Urine Negative Negative mg/dL    Specific Gravity, UA 1.020 1.005 - 1.030    Blood, Urine SMALL (A) Negative    pH, UA 7.0 5.0 - 8.0    Protein, UA TRACE (A) Negative mg/dL    Urobilinogen, Urine 1.0 <2.0 E.U./dL    Nitrite, Urine Negative Negative    Leukocyte Esterase, Urine TRACE (A) Negative    Microscopic Examination YES     Urine Type Cleancatch     Urine Reflex to Culture Yes    Microscopic Urinalysis   Result Value Ref Range    Mucus, UA 1+ (A) None Seen /LPF    WBC, UA 21-50 (A) 0 - 5 /HPF    RBC, UA 3-4 0 - 4 /HPF    Epithelial Cells, UA 21-50 (A) 0 - 5 /HPF    Bacteria, UA 2+ (A) None Seen /HPF    Trichomonas, UA Present (A) None Seen /HPF   Blood Occult Stool #1   Result Value Ref Range    Occult Blood Diagnostic (A)      Result: POSITIVE  Normal range: Negative   11:54  04/09/2022         All other labs were within normal range or not returned as of this dictation. EKG: All EKG's are interpreted by the Emergency Department Physician who either signs orCo-signs this chart in the absence of a cardiologist.    None    RADIOLOGY:   Non-plain film images such as CT, Ultrasound and MRI are read by the radiologist. Yunior Haider radiographic images are visualized and preliminarily interpreted by the  EDProvider with the below findings:    CT ABDOMEN PELVIS WO CONTRAST Additional Contrast? None    Result Date: 4/9/2022  EXAMINATION: CT OF THE ABDOMEN AND PELVIS WITHOUT CONTRAST 4/9/2022 12:39 pm TECHNIQUE: CT of the abdomen and pelvis was performed without the administration of intravenous contrast. Multiplanar reformatted images are provided for review. Dose modulation, iterative reconstruction, and/or weight based adjustment of the mA/kV was utilized to reduce the radiation dose to as low as reasonably achievable.  COMPARISON: 12/23/2019 HISTORY: ORDERING SYSTEM PROVIDED HISTORY: generalized abdominal pain TECHNOLOGIST PROVIDED HISTORY: Reason for exam:->generalized abdominal pain Additional Contrast?->None Decision Support Exception - unselect if not a suspected or confirmed emergency medical condition->Emergency Medical Condition (MA) Is the patient pregnant?->No Reason for Exam: PT. C/O GENERALIZED ABD PAIN X 2 WEEKS, WITH SOME BLOOD IN STOOL WITH NAUSEA  AND EMESIS, STATES HAS HAD INCREASED PAIN WITH URINATION Relevant Medical/Surgical History: NO HX OF ABD PROBLEMS, NO HX OF SURGERY TO ABD, NO HX OF CA FINDINGS: Lower Chest: No acute infiltrate at the lung bases. Organs: Multiple hepatic lesions most consistent with metastatic disease. The largest lesion near the diaphragmatic surface of the liver measures 3.9 x 5.6 cm. Gallbladder is partially contracted. No biliary dilatation. Spleen, pancreas and adrenal glands are unremarkable. No evidence of obstructive uropathy. GI/Bowel: There is suggestion of an annular mass in the distal transverse colon concerning for underlying malignancy. No evidence of obstruction or significant inflammatory changes. Scattered colonic diverticula with no acute features. No evidence of appendicitis. No small bowel distension. Stomach and duodenal sweep are unremarkable. Small hiatal hernia. Pelvis: Trace free pelvic fluid, likely physiologic. The uterus and adnexal structures are unremarkable. Multiple calcified pelvic phleboliths. The bladder is contracted. Peritoneum/Retroperitoneum: The abdominal aorta is normal in caliber. No pathologic retroperitoneal adenopathy or upper abdominal ascites. Mesenteric adenopathy adjacent to the lesion in the transverse colon with the largest node measuring 11 x 12 mm. Bones/Soft Tissues: No acute osseous or soft tissue abnormality. Small fat containing umbilical hernia. 1. Multiple hepatic lesions most consistent with metastatic disease. Findings concerning for an annular mass in the distal transverse colon, likely a colonic primary with adjacent adenopathy. 2. No other acute findings within the abdomen or pelvis.            PROCEDURES   Unless otherwise noted below, none     Procedures    CRITICAL CARE TIME N/A    CONSULTS:  IP CONSULT TO GI    EMERGENCY DEPARTMENT COURSE and DIFFERENTIAL DIAGNOSIS/MDM:   Vitals:    Vitals:    04/09/22 1330 04/09/22 1345 04/09/22 1523 04/09/22 1738   BP: 119/67 138/84 (!) 140/79 (!) 152/56   Pulse:   59 67   Resp:   18 18   Temp:    99.2 °F (37.3 °C)   TempSrc:    Oral   SpO2: 100% 100% 100% 100%   Weight:       Height:           Patient was given the following medications:  Medications   ondansetron (ZOFRAN) injection 4 mg (has no administration in time range)   ketorolac (TORADOL) injection 15 mg (15 mg IntraVENous Given 4/9/22 1145)   ondansetron (ZOFRAN) injection 4 mg (4 mg IntraVENous Given 4/9/22 1144)   0.9 % sodium chloride bolus (0 mLs IntraVENous Stopped 4/9/22 1251)   cefTRIAXone (ROCEPHIN) 1000 mg IVPB in 50 mL D5W minibag (0 mg IntraVENous Stopped 4/9/22 1744)       The presence of the trichomonas had me considering the possibility of a PID type picture actually being the pathology that caused her pain that brought her to the emergency department. .  So, I did order a GC and a wet prep study. However now we also had these other findings on the CAT scan which are more concerning. She does not have a primary care provider. Currently, I requested inpatient care from the hospitalist and awaiting response    FINAL IMPRESSION      1. Generalized abdominal pain    2. Abnormal CT of the abdomen          DISPOSITION/PLAN    DISPOSITION Admitted 04/09/2022 02:01:32 PM      PATIENT REFERRED TO:  No follow-up provider specified.     DISCHARGE MEDICATIONS:  New Prescriptions    No medications on file       DISCONTINUED MEDICATIONS:  Discontinued Medications    No medications on file              (Please note that portions of this note were completed with a voice recognition program.  Efforts were made to editthe dictations but occasionally words are mis-transcribed.)    Patti Durán MD (electronically signed)            Patti Durán MD  04/09/22 1827

## 2022-04-09 NOTE — ED NOTES
ED SBAR report provider to [facility]. Patient to be transported to Room 4257 via stretcher by [facility]. IV site clean, dry, and intact. MEWS score and pain assessed as 4/10 and documented. Updated patient on plan of care.          Jason Thomas RN  04/09/22 0786

## 2022-04-09 NOTE — ED NOTES
Report given to Blair Hart RN at this time, all questions answered. Denies any further questions at this time.       Ángel Romero RN  04/09/22 4391

## 2022-04-09 NOTE — LETTER
Hospital Account #: [de-identified]    Patient Name: [facility]   Patient : 1980 [address] [place] [address]     To whom it may concern,           Patient mentioned above had been admitted to our hospital since 2022, patient is currently treated for stage IV colon cancer, had underwent surgery and is currently being treated at our facility.    Letter was written based on patient's request.             4/15/22  Mj Patino MD  Hospitalist, [facility]

## 2022-04-09 NOTE — ED NOTES
EMS at bedside at this time for transfer to [facility]. Report given to EMS at this time.      Krystyna Valentin RN  04/09/22 [id number]

## 2022-04-10 LAB
BASOPHILS ABSOLUTE: 0 K/UL (ref 0–0.2)
BASOPHILS RELATIVE PERCENT: 0.5 %
CA 125: 21.2 U/ML (ref 0–35)
CEA: 6.8 NG/ML (ref 0–5)
EOSINOPHILS ABSOLUTE: 0.3 K/UL (ref 0–0.6)
EOSINOPHILS RELATIVE PERCENT: 2.7 %
HCT VFR BLD CALC: 32.3 % (ref 36–48)
HEMOGLOBIN: 10.9 G/DL (ref 12–16)
IRON SATURATION: 9 % (ref 15–50)
IRON: 17 UG/DL (ref 37–145)
LYMPHOCYTES ABSOLUTE: 1.3 K/UL (ref 1–5.1)
LYMPHOCYTES RELATIVE PERCENT: 13.6 %
MCH RBC QN AUTO: 32.4 PG (ref 26–34)
MCHC RBC AUTO-ENTMCNC: 33.9 G/DL (ref 31–36)
MCV RBC AUTO: 95.7 FL (ref 80–100)
MONOCYTES ABSOLUTE: 1.1 K/UL (ref 0–1.3)
MONOCYTES RELATIVE PERCENT: 11.5 %
NEUTROPHILS ABSOLUTE: 6.9 K/UL (ref 1.7–7.7)
NEUTROPHILS RELATIVE PERCENT: 71.7 %
ORGANISM: ABNORMAL
PDW BLD-RTO: 13.3 % (ref 12.4–15.4)
PLATELET # BLD: 234 K/UL (ref 135–450)
PMV BLD AUTO: 8 FL (ref 5–10.5)
RBC # BLD: 3.37 M/UL (ref 4–5.2)
TOTAL IRON BINDING CAPACITY: 182 UG/DL (ref 260–445)
URINE CULTURE, ROUTINE: ABNORMAL
URINE CULTURE, ROUTINE: ABNORMAL
WBC # BLD: 9.6 K/UL (ref 4–11)

## 2022-04-10 PROCEDURE — 83540 ASSAY OF IRON: CPT

## 2022-04-10 PROCEDURE — 6370000000 HC RX 637 (ALT 250 FOR IP): Performed by: NURSE PRACTITIONER

## 2022-04-10 PROCEDURE — 82378 CARCINOEMBRYONIC ANTIGEN: CPT

## 2022-04-10 PROCEDURE — 6370000000 HC RX 637 (ALT 250 FOR IP): Performed by: INTERNAL MEDICINE

## 2022-04-10 PROCEDURE — 82105 ALPHA-FETOPROTEIN SERUM: CPT

## 2022-04-10 PROCEDURE — 85025 COMPLETE CBC W/AUTO DIFF WBC: CPT

## 2022-04-10 PROCEDURE — 6360000002 HC RX W HCPCS: Performed by: INTERNAL MEDICINE

## 2022-04-10 PROCEDURE — 1200000000 HC SEMI PRIVATE

## 2022-04-10 PROCEDURE — 86304 IMMUNOASSAY TUMOR CA 125: CPT

## 2022-04-10 PROCEDURE — 83550 IRON BINDING TEST: CPT

## 2022-04-10 PROCEDURE — 2580000003 HC RX 258: Performed by: INTERNAL MEDICINE

## 2022-04-10 PROCEDURE — 86301 IMMUNOASSAY TUMOR CA 19-9: CPT

## 2022-04-10 PROCEDURE — 36415 COLL VENOUS BLD VENIPUNCTURE: CPT

## 2022-04-10 RX ADMIN — SODIUM CHLORIDE, PRESERVATIVE FREE 10 ML: 5 INJECTION INTRAVENOUS at 09:25

## 2022-04-10 RX ADMIN — ONDANSETRON 4 MG: 2 INJECTION INTRAMUSCULAR; INTRAVENOUS at 09:21

## 2022-04-10 RX ADMIN — METRONIDAZOLE 500 MG: 500 TABLET ORAL at 20:41

## 2022-04-10 RX ADMIN — SODIUM CHLORIDE: 9 INJECTION, SOLUTION INTRAVENOUS at 20:43

## 2022-04-10 RX ADMIN — IRON SUCROSE 200 MG: 20 INJECTION, SOLUTION INTRAVENOUS at 12:20

## 2022-04-10 RX ADMIN — DICYCLOMINE HYDROCHLORIDE 20 MG: 10 CAPSULE ORAL at 21:11

## 2022-04-10 RX ADMIN — ONDANSETRON 4 MG: 2 INJECTION INTRAMUSCULAR; INTRAVENOUS at 21:10

## 2022-04-10 RX ADMIN — DOXYCYCLINE HYCLATE 100 MG: 100 TABLET, FILM COATED ORAL at 20:41

## 2022-04-10 RX ADMIN — POLYETHYLENE GLYCOL-3350 AND ELECTROLYTES 4000 ML: 236; 6.74; 5.86; 2.97; 22.74 POWDER, FOR SOLUTION ORAL at 16:14

## 2022-04-10 RX ADMIN — DICYCLOMINE HYDROCHLORIDE 20 MG: 10 CAPSULE ORAL at 14:54

## 2022-04-10 RX ADMIN — METRONIDAZOLE 500 MG: 500 TABLET ORAL at 09:18

## 2022-04-10 RX ADMIN — DOXYCYCLINE HYCLATE 100 MG: 100 TABLET, FILM COATED ORAL at 09:18

## 2022-04-10 RX ADMIN — SODIUM CHLORIDE, PRESERVATIVE FREE 10 ML: 5 INJECTION INTRAVENOUS at 22:07

## 2022-04-10 ASSESSMENT — PAIN SCALES - GENERAL
PAINLEVEL_OUTOF10: 0
PAINLEVEL_OUTOF10: 3

## 2022-04-10 NOTE — PROGRESS NOTES
Pt admitted to room 4257 on 4N. Pt ambulated independently no devices from stretcher to bed. Pt oriented to room and to call light.

## 2022-04-10 NOTE — H&P
Hospital Medicine History & Physical      PCP: No primary care provider on file. Date of Admission: 4/9/2022    Date of Service: Pt seen/examined on 4/9/2022 and Admitted to Inpatient     Chief Complaint:  generalized abdominal pain, blood in stool. History Of Present Illness: The patient is a 36 y.o. female who presents to [facility] with limited PMHx: Asthma    Lives at home  CODE STATUS: Full  Anticoagulation therapy: None  GYN: G7, P7, Ab0  LMP March 28, typically has 2 menstrual cycles a month about 14 days apart and they last 2 to 3 days each  Denies tobacco smoking, vaping. Positive social marijuana use  EtOH occasional.  Patient reports occasional NSAID use. Patient presented to [facility] emergency department today for evaluation of generalized abdominal pain, onset 4-6 weeks ago. She is experienced heartburn. She had nausea and vomiting this past Thursday. Pain in the abdomen is becoming sharp and crampy. Positive for dysuria. No known fever or chills. In addition has noticed blood in stool. Denies weight loss, night sweats, fevers or chills. Denies any known family history of colon cancer, living cancer. Has never had a colonoscopy  Patient denies any vaginal discharge or prior history of STI. ED work-up: Wet prep positive for clue cells, urinalysis positive for trichomonas. GC pending. Pregnancy negative. Urine micro WBC 21-50, 2+ bacteria  Hemoccult positive. CBC: Leukocytosis 12.8 with a left shift of 9.2. Metabolic panel fairly unremarkable. CT of the abdomen and pelvis indicating multiple lesions within the liver and there is also a transverse colon mass noted. Patient was started on Rocephin at admission was requested. In addition a Covid test was obtained. Patient had T-max 100.2.   No tachycardia, no hypotension and no respiratory distress. No oxygen demand and pulse oximetry 99 to 100%. On my exam: Patient is awake alert and oriented. Her boyfriend is at bedside. She gave permission for me to examine her and discuss plan of care and current diagnostic study results. Currently reporting some generalized abdominal cramping. Currently denying nausea. Negative for chest pain or shortness of breath. Chest is clear and abdomen is soft. No distention. Mildly tender bilateral lower quadrants. No rebound or guarding    Past Medical History:        Diagnosis Date    Asthma        Past Surgical History:    History reviewed. No pertinent surgical history. Medications Prior to Admission:    Prior to Admission medications    Medication Sig Start Date End Date Taking? Authorizing Provider   ondansetron (ZOFRAN ODT) 4 MG disintegrating tablet Take 1 tablet by mouth every 8 hours as needed for Nausea 12/23/19   Winsome Brewster MD   dicyclomine (BENTYL) 10 MG capsule Take 1 capsule by mouth every 6 hours as needed (cramps) 12/23/19   Winsome Brewster MD   hydrocortisone (ANUSOL-HC) 2.5 % rectal cream Place rectally 2 times daily. 12/23/19   Winsome Brewster MD       Allergies:  Codeine and Pcn [penicillins]    Social History:  The patient currently lives at home    TOBACCO:   reports that she has never smoked. She has never used smokeless tobacco.  ETOH:   reports current alcohol use. Family History:  Reviewed in detail and negative for DM, Early CAD, Cancer, CVA. Positive as follows:    History reviewed. No pertinent family history. REVIEW OF SYSTEMS:   Positive for abdominal cramping and as noted in the HPI. All other systems reviewed and negative.     PHYSICAL EXAM:    /66   Pulse 70   Temp 98.1 °F (36.7 °C) (Oral)   Resp 18   Ht 5' 6\" (1.676 m)   Wt 225 lb 1.4 oz (102.1 kg)   LMP 03/30/2022 (Approximate)   SpO2 100%   BMI 36.33 kg/m²     General appearance: No apparent distress appears stated age and cooperative. HEENT Normal cephalic, atraumatic without obvious deformity. Pupils equal, round, and reactive to light. Extra ocular muscles intact. Conjunctivae/corneas clear. Neck: Supple, No jugular venous distention/bruits. Trachea midline without thyromegaly or adenopathy with full range of motion. Lungs: Clear to auscultation, bilaterally without Rales/Wheezes/Rhonchi with good respiratory effort. Heart: Regular rate and rhythm with Normal S1/S2 without murmurs, rubs or gallops, point of maximum impulse non-displaced  Abdomen: Soft, non-tender or non-distended without rigidity or guarding and positive bowel sounds all four quadrants. Extremities: No clubbing, cyanosis, or edema bilaterally. Full range of motion without deformity and normal gait intact. Skin: Skin color, texture, turgor normal.  No rashes or lesions. Neurologic: Alert and oriented X 3, neurovascularly intact with sensory/motor intact upper extremities/lower extremities, bilaterally. Cranial nerves: II-XII intact, grossly non-focal.  Mental status: Alert, oriented, thought content appropriate. Capillary Refill: Acceptable  < 3 seconds  Peripheral Pulses: +3 Easily felt, not easily obliterated with pressure      CXR:  I have reviewed the CXR with the following interpretation: N/A  EKG:  I have reviewed the EKG with the following interpretation: N/A    CBC   Recent Labs     04/09/22  1105   WBC 12.8*   HGB 11.8*   HCT 34.9*         RENAL  Recent Labs     04/09/22  1105      K 4.0      CO2 20*   BUN 12   CREATININE 0.9     LFT'S  Recent Labs     04/09/22  1105   AST 53*   ALT 25   BILITOT 0.6   ALKPHOS 77     COAG  No results for input(s): INR in the last 72 hours. CARDIAC ENZYMES  No results for input(s): CKTOTAL, CKMB, CKMBINDEX, TROPONINI in the last 72 hours.     U/A:    Lab Results   Component Value Date    COLORU Yellow 04/09/2022    WBCUA 21-50 04/09/2022    RBCUA 3-4 04/09/2022    MUCUS 1+ 04/09/2022 BACTERIA 2+ 04/09/2022    CLARITYU Clear 04/09/2022    SPECGRAV 1.020 04/09/2022    LEUKOCYTESUR TRACE 04/09/2022    BLOODU SMALL 04/09/2022    GLUCOSEU Negative 04/09/2022    AMORPHOUS 1+ 02/20/2015       ABG  No results found for: Kristen Beverly, LUCIANO, Abdiaziz Lew, [place]        Active Hospital Problems    Diagnosis Date Noted    Lower GI bleed [K92.2] 04/09/2022    Trichimoniasis [A59.9] 04/09/2022    Liver lesion [K76.9] 04/09/2022    Colonic mass [K63.89] 04/09/2022         PHYSICIANS CERTIFICATION:    I certify that Abigail Menon is expected to be hospitalized for more than 2 midnights based on the following assessment and plan:      ASSESSMENT/PLAN:  Generalized abdominal pain: Onset 4 to 6 weeks ago  CT abdomen and pelvis indicating liver lesions and transverse colon mass: This is obviously concerning for malignancy scenario  No free air. No free fluid. CT results were discussed with the patient. GI consulted  WBC 12.8 with a left shift of 9. 2(could be related to trichomoniasis, potential development of PID and or UTI)      Lower GI bleed: Hemoccult positive  Colon mass as identified on CT  GI consulted  Hold prophylactic anticoagulation  H&H stable 11/34=> CBC, TIBC in a.m. Patient has never had a colonoscopy. Liver lesion: As evidenced on CT incidental finding  Liver enzymes unremarkable, AST slightly elevated at 53. GI consulted    Trichomonas: Positive UA  Flagyl 500 mg p.o. twice daily x7 days trichomoniasis treatment  GC pending  Should have outpatient GYN follow-up=> 2 weeks  I did express to her and to the boyfriend that he also requires treatment otherwise stable reinfect each other if he is not treated  With lower abdominal pain certainly PID should be considered, again GC is pending. I will add Doxycycline till [address] results available. CT does not indicate any evidence of tubo-ovarian abscess.   WBC 12.8, T-max 100.2      Bacterial vaginosis: Clue cells on wet prep  Treatment: Flagyl-> continue current dose    UTI: Micro UA WBC 21-50 and 2+ bacteria, epithelial cells 21-50  Rocephin received in ED  Await culture for any further antibiotic coverage as this appears to be a contaminated specimen. And the patient did not dysuria. DVT Prophylaxis: Low risk,  Diet: Diet NPO Exceptions are: Ice Chips  Code Status: Full Code  PT/OT Eval Status: Independent    Dispo - admit, inpt       Marcel Rahman, APRN - CNP    Thank you No primary care provider on file. for the opportunity to be involved in this patient's care. If you have any questions or concerns please feel free to contact me at [phone]. This dictation was performed with a verbal recognition program (DRAGON) and it was checked for errors. It is possible that there are still dictated errors within this office note. If so, please bring any errors to my attention for an addendum. All efforts were made to ensure that this office note is accurate.

## 2022-04-10 NOTE — PLAN OF CARE
Problem: Pain:  Goal: Pain level will decrease  Description: Pain level will decrease  4/9/2022 2249 by Darryle Shave, RN  Outcome: Ongoing  4/9/2022 2249 by Darryle Shave, RN  Outcome: Ongoing  Goal: Control of acute pain  Description: Control of acute pain  4/9/2022 2249 by Darryle Shave, RN  Outcome: Ongoing  4/9/2022 2249 by Darryle Shave, RN  Outcome: Ongoing  Goal: Control of chronic pain  Description: Control of chronic pain  4/9/2022 2249 by Darryle Shave, RN  Outcome: Ongoing  4/9/2022 2249 by Darryle Shave, RN  Outcome: Ongoing     Problem: Safety:  Goal: Free from accidental physical injury  Description: Free from accidental physical injury  4/9/2022 2249 by Darryle Shave, RN  Outcome: Ongoing  4/9/2022 2249 by Darryle Shave, RN  Outcome: Ongoing  Goal: Free from intentional harm  Description: Free from intentional harm  4/9/2022 2249 by Darryle Shave, RN  Outcome: Ongoing  4/9/2022 2249 by Darryle Shave, RN  Outcome: Ongoing     Problem: Daily Care:  Goal: Daily care needs are met  Description: Daily care needs are met  4/9/2022 2249 by Darryle Shave, RN  Outcome: Ongoing  4/9/2022 2249 by Darryle Shave, RN  Outcome: Ongoing     Problem: Discharge Planning:  Goal: Patients continuum of care needs are met  Description: Patients continuum of care needs are met  4/9/2022 2249 by Darryle Shave, RN  Outcome: Ongoing  4/9/2022 2249 by Darryle Shave, RN  Outcome: Ongoing

## 2022-04-10 NOTE — PROGRESS NOTES
Hospitalist Progress Note      PCP: No primary care provider on file. Date of Admission: 4/9/2022    Subjective: abd pain improved, no nausea/vomiting    Medications:  Reviewed    Infusion Medications    sodium chloride      sodium chloride 75 mL/hr at 04/09/22 2135     Scheduled Medications    polyethylene glycol  4,000 mL Oral Once    iron sucrose  200 mg IntraVENous Q24H    sodium chloride flush  5-40 mL IntraVENous 2 times per day    metroNIDAZOLE  500 mg Oral 2 times per day    doxycycline hyclate  100 mg Oral 2 times per day     PRN Meds: sodium chloride flush, sodium chloride, ondansetron **OR** ondansetron, acetaminophen **OR** acetaminophen, dicyclomine    No intake or output data in the 24 hours ending 04/10/22 1304    Physical Exam Performed:    /72   Pulse 74   Temp 98.3 °F (36.8 °C) (Oral)   Resp 18   Ht 5' 6\" (1.676 m)   Wt 225 lb 1.4 oz (102.1 kg)   LMP 03/30/2022 (Approximate)   SpO2 100%   BMI 36.33 kg/m²       General appearance: No apparent distress appears stated age and cooperative. Lungs: Clear to auscultation, bilaterally without Rales/Wheezes/Rhonchi with good respiratory effort. Heart: Regular rate and rhythm with Normal S1/S2 without murmurs, rubs or gallops, point of maximum impulse non-displaced  Abdomen: Soft, non-tender or non-distended without rigidity or guarding and positive bowel sounds   Extremities: No clubbing, cyanosis, or edema bilaterally. Skin: Skin color, texture, turgor normal.  No rashes or lesions. Neurologic: Alert and oriented X 3, neurovascularly intact with sensory/motor intact upper extremities/lower extremities, bilaterally. Cranial nerves: II-XII intact, grossly non-focal.  Mental status: Alert, oriented, thought content appropriate.   Capillary Refill: Acceptable  < 3 seconds  Peripheral Pulses: +3 Easily felt, not easily obliterated with pressure       Labs:   Recent Labs     04/09/22  1105 04/10/22  0713   WBC 12.8* 9.6   HGB 11.8* 10.9*   HCT 34.9* 32.3*    234     Recent Labs     04/09/22  1105      K 4.0      CO2 20*   BUN 12   CREATININE 0.9   CALCIUM 9.9     Recent Labs     04/09/22  1105   AST 53*   ALT 25   BILITOT 0.6   ALKPHOS 77     No results for input(s): INR in the last 72 hours. No results for input(s): Demetris Tanesha in the last 72 hours. Urinalysis:      Lab Results   Component Value Date    NITRU Negative 04/09/2022    WBCUA 21-50 04/09/2022    BACTERIA 2+ 04/09/2022    RBCUA 3-4 04/09/2022    BLOODU SMALL 04/09/2022    SPECGRAV 1.020 04/09/2022    GLUCOSEU Negative 04/09/2022       Radiology:  CT ABDOMEN PELVIS WO CONTRAST Additional Contrast? None   Final Result   1. Multiple hepatic lesions most consistent with metastatic disease. Findings concerning for an annular mass in the distal transverse colon,   likely a colonic primary with adjacent adenopathy. 2. No other acute findings within the abdomen or pelvis. Assessment/Plan:    Active Hospital Problems    Diagnosis     Lower GI bleed [K92.2]     Trichimoniasis [A59.9]     Liver lesion [K76.9]     Colonic mass [K63.89]          Generalized abdominal pain: Onset 4 to 6 weeks ago  CT abdomen and pelvis indicating liver lesions and transverse colon mass: This is obviously concerning for malignancy scenario  No free air. No free fluid. CT results were discussed with the patient. GI consulted  WBC 12.8 with a left shift of 9. 2(could be related to trichomoniasis, potential development of PID and or UTI)        Lower GI bleed: Hemoccult positive  Colon mass as identified on CT  GI consulted  Hold prophylactic anticoagulation  H&H stable   Patient has never had a colonoscopy. Anemia - iron def anemia, occult positive. Started on IV venofer, GI consulted     Liver lesion: As evidenced on CT incidental finding  Liver enzymes unremarkable, AST slightly elevated at 53.   GI consulted     Trichomonas: Positive UA  Flagyl 500 mg p.o. twice daily x7 days trichomoniasis treatment  GC pending  Should have outpatient GYN follow-up=> 2 weeks  I did express to her and to the boyfriend that he also requires treatment otherwise they will reinfect each other if he is not treated  With lower abdominal pain certainly PID should be considered, again GC is pending. Added Doxycycline till [address] results available. CT does not indicate any evidence of tubo-ovarian abscess. WBC 12.8, T-max 100. 2        Bacterial vaginosis: Clue cells on wet prep  Treatment: Flagyl-> continue current dose     UTI: Micro UA WBC 21-50 and 2+ bacteria, epithelial cells 21-50  Rocephin received in ED  Await culture for any further antibiotic coverage as this appears to be a contaminated specimen. And the patient did not dysuria.              DVT prophylaxis - SCD  Diet: ADULT DIET;  Clear Liquid  Diet NPO Exceptions are: Ice Chips  Code Status: Full Code    PT/OT Eval Status: ordered    Juliette Duran MD

## 2022-04-10 NOTE — PROGRESS NOTES
Pt IV started leaking and catheter came out of skin. Removed IV without complications. Yashira GRACE placed a new 22  IV in the right forearm. Flushed well. Will continue to monitor.

## 2022-04-10 NOTE — PLAN OF CARE
Problem: Pain:  Goal: Pain level will decrease  Description: Pain level will decrease  4/10/2022 1131 by Bishnu Anderson RN  Outcome: Ongoing  4/9/2022 2249 by Levi Brown RN  Outcome: Ongoing  4/9/2022 2249 by Levi Brown RN  Outcome: Ongoing  Goal: Control of acute pain  Description: Control of acute pain  4/10/2022 1131 by Bishnu Anderson RN  Outcome: Ongoing  4/9/2022 2249 by Levi Brown RN  Outcome: Ongoing  4/9/2022 2249 by Levi Brown RN  Outcome: Ongoing  Goal: Control of chronic pain  Description: Control of chronic pain  4/10/2022 1131 by Bishnu Anderson RN  Outcome: Ongoing  4/9/2022 2249 by Levi Brown RN  Outcome: Ongoing  4/9/2022 2249 by Levi Brown RN  Outcome: Ongoing     Problem: Safety:  Goal: Free from accidental physical injury  Description: Free from accidental physical injury  4/10/2022 1131 by Bishnu Anderson RN  Outcome: Ongoing  4/9/2022 2249 by Levi Brown RN  Outcome: Ongoing  4/9/2022 2249 by Levi Brown RN  Outcome: Ongoing  Goal: Free from intentional harm  Description: Free from intentional harm  4/10/2022 1131 by Bishnu Anderson RN  Outcome: Ongoing  4/9/2022 2249 by Levi Brown RN  Outcome: Ongoing  4/9/2022 2249 by Levi Brown RN  Outcome: Ongoing     Problem: Daily Care:  Goal: Daily care needs are met  Description: Daily care needs are met  4/10/2022 1131 by Bishnu Anderson RN  Outcome: Ongoing  4/9/2022 2249 by Levi Brown RN  Outcome: Ongoing  4/9/2022 2249 by Levi Brown RN  Outcome: Ongoing     Problem: Discharge Planning:  Goal: Patients continuum of care needs are met  Description: Patients continuum of care needs are met  4/10/2022 1131 by Bishnu Anderson RN  Outcome: Ongoing  4/9/2022 2249 by Levi Brown RN  Outcome: Ongoing  4/9/2022 2249 by Levi Brown RN  Outcome: Ongoing

## 2022-04-10 NOTE — CONSULTS
GASTROENTEROLOGY INPATIENT CONSULTATION:        IDENTIFYING DATA/REASON FOR CONSULTATION   PATIENT:  Wayland Nissen  MRN:  [id number]  ADMIT DATE: 4/9/2022  TIME OF EVALUATION: 4/10/2022 5:25 PM  HOSPITAL STAY:   LOS: 1 day     REASON FOR CONSULTATION:  Anemia, rectal bleeding, colon mass     HISTORY OF PRESENT ILLNESS   Wayland Nissen is a 36 y.o. female with asthma, multiparous state, occasional alcohol use who presented with abdominal pain over the past 4-6 weeks, nausea, vomiting, dysuria, blood in the stool. No weight loss, f/c, melena. Denies any new medications. No family history fo colon cancer. Underwent CT scan in the ED noting multiple liver lesions and a mass in the transverse colon. PAST MEDICAL, SURGICAL, FAMILY, and SOCIAL HISTORY     Past Medical History:   Diagnosis Date    Asthma      History reviewed. No pertinent surgical history. History reviewed. No pertinent family history. Social History     Socioeconomic History    Marital status: Single     Spouse name: None    Number of children: None    Years of education: None    Highest education level: None   Occupational History    None   Tobacco Use    Smoking status: Never Smoker    Smokeless tobacco: Never Used   Substance and Sexual Activity    Alcohol use: Yes     Comment: socially    Drug use: No    Sexual activity: None   Other Topics Concern    None   Social History Narrative    None     Social Determinants of Health     Financial Resource Strain:     Difficulty of Paying Living Expenses: Not on file   Food Insecurity:     Worried About Running Out of Food in the Last Year: Not on file    Jose of Food in the Last Year: Not on file   Transportation Needs:     Lack of Transportation (Medical): Not on file    Lack of Transportation (Non-Medical):  Not on file   Physical Activity:     Days of Exercise per Week: Not on file    Minutes of Exercise per Session: Not on file   Stress:     Feeling of Stress : Not on file   Social Connections:     Frequency of Communication with Friends and Family: Not on file    Frequency of Social Gatherings with Friends and Family: Not on file    Attends [employer] Services: Not on file    Active Member of [address] [place] [address] or Organizations: Not on file    Attends Club or Organization Meetings: Not on file    Marital Status: Not on file   Intimate Partner Violence:     Fear of Current or Ex-Partner: Not on file    Emotionally Abused: Not on file    Physically Abused: Not on file    Sexually Abused: Not on file   Housing Stability:     Unable to Pay for Housing in the Last Year: Not on file    Number of [place] in the Last Year: Not on file    Unstable Housing in the Last Year: Not on file       MEDICATIONS   SCHEDULED:  iron sucrose, 200 mg, Q24H  sodium chloride flush, 5-40 mL, 2 times per day  metroNIDAZOLE, 500 mg, 2 times per day  doxycycline hyclate, 100 mg, 2 times per day      FLUIDS/DRIPS:     sodium chloride      sodium chloride 75 mL/hr at 04/09/22 2135     PRNs: sodium chloride flush, 5-40 mL, PRN  sodium chloride, , PRN  ondansetron, 4 mg, Q8H PRN   Or  ondansetron, 4 mg, Q6H PRN  acetaminophen, 650 mg, Q6H PRN   Or  acetaminophen, 650 mg, Q6H PRN  dicyclomine, 20 mg, Q6H PRN      ALLERGIES:    Allergies   Allergen Reactions    Codeine Hives    Pcn [Penicillins] Hives         REVIEW OF SYSTEMS   A full 12 pt ROS is negative other than noted in HPI    PHYSICAL EXAM     Wt Readings from Last 3 Encounters:   04/10/22 225 lb 1.4 oz (102.1 kg)   12/23/19 217 lb 13 oz (98.8 kg)   06/23/19 217 lb 9.5 oz (98.7 kg)     Temp Readings from Last 3 Encounters:   04/10/22 99.5 °F (37.5 °C) (Oral)   12/23/19 98.5 °F (36.9 °C) (Oral)   06/23/19 98.3 °F (36.8 °C) (Oral)     BP Readings from Last 3 Encounters:   04/10/22 (!) 123/58   12/23/19 117/71   06/23/19 127/78     Pulse Readings from Last 3 Encounters:   04/10/22 79   12/23/19 71   06/23/19 63      I/O last 3 completed shifts: In: 996.2 [IV Piggyback:996.2]  Out: -     Physical Exam:  Gen: Alert, well appearing, well nourished, appears stated age, and in no acute distress. HEENT: Normocephalic, atraumatic, no scleral icterus   CV: Regular rate and rhythm, no murmurs, rubs, or gallops. Extremities are warm and pulses are present bilaterally in upper and lower extremities. Pul: clear to auscultation bilaterally, without wheezes, rhonchi, or rales    Abd: Good bowel sounds throughout, no scars, soft. Non tender. Nondistended, without rebound or guarding. Ext: No edema, moves all 4 extremities. Neuro: Moves all four extremities, no gross deficits, follows commands.  Gait is normal.   Skin: No jaundice, spider angiomas, palmar erythema    LABS AND IMAGING     Recent Results (from the past 24 hour(s))   Iron and TIBC    Collection Time: 04/10/22  7:12 AM   Result Value Ref Range    Iron 17 (L) 37 - 145 ug/dL    TIBC 182 (L) 260 - 445 ug/dL    Iron Saturation 9 (L) 15 - 50 %   CBC with Auto Differential    Collection Time: 04/10/22  7:13 AM   Result Value Ref Range    WBC 9.6 4.0 - 11.0 K/uL    RBC 3.37 (L) 4.00 - 5.20 M/uL    Hemoglobin 10.9 (L) 12.0 - 16.0 g/dL    Hematocrit 32.3 (L) 36.0 - 48.0 %    MCV 95.7 80.0 - 100.0 fL    MCH 32.4 26.0 - 34.0 pg    MCHC 33.9 31.0 - 36.0 g/dL    RDW 13.3 12.4 - 15.4 %    Platelets 102 215 - 739 K/uL    MPV 8.0 5.0 - 10.5 fL    Neutrophils % 71.7 %    Lymphocytes % 13.6 %    Monocytes % 11.5 %    Eosinophils % 2.7 %    Basophils % 0.5 %    Neutrophils Absolute 6.9 1.7 - 7.7 K/uL    Lymphocytes Absolute 1.3 1.0 - 5.1 K/uL    Monocytes Absolute 1.1 0.0 - 1.3 K/uL    Eosinophils Absolute 0.3 0.0 - 0.6 K/uL    Basophils Absolute 0.0 0.0 - 0.2 K/uL   CEA    Collection Time: 04/10/22  7:13 AM   Result Value Ref Range    CEA 6.8 (H) 0.0 - 5.0 ng/mL       Collection Time: 04/10/22  7:13 AM   Result Value Ref Range     21.2 0.0 - 35.0 U/mL     Other Labs      Imaging      ASSESSMENT AND RECOMMENDATIONS   Elisha Carlos is a 36 y.o. female who has a past medical history of Asthma. . We have been consulted regarding    IMPRESSION:    1. Rectal bleeding. In the setting of possible colon mass seen on CT scan. 2. Colon lesion seen on CT scan - concerning for a primary malignancy. No prior colonoscopy. 3. Multiple liver lesions concerning for metastatic disease   4. Acute on chronic iron deficiency anemia     RECOMMENDATIONS:    - clear liquid diet today. Give golytely prep starting at 4 pm.   - NPO past midnight   - plan for colonoscopy for further evaluation of colon mass on 4/11.   - obtain triple phase CT of the liver to further evaluate liver lesions   - check tumor markers, afp, ca 19-9, ca 125, cea. If you have any questions or need any further information, please feel free to contact our consult team.  Thank you for allowing us to participate in the care of Elisha Carlos.     Sandrea Carrel, MD  [address] [facility]

## 2022-04-10 NOTE — PROGRESS NOTES
4 Eyes Skin Assessment     NAME:  Adrianne Shultz  YOB: 1980  MEDICAL RECORD NUMBER:  [id number]    The patient is being assess for  Admission    I agree that 2 RN's have performed a thorough Head to Toe Skin Assessment on the patient. ALL assessment sites listed below have been assessed. Areas assessed by both nurses:    Head, Face, Ears, Shoulders, Back, Chest, Arms, Elbows, Hands, Sacrum. Buttock, Coccyx, Ischium and Legs. Feet and Heels        Does the Patient have a Wound?  No noted wound(s)       Derrick Prevention initiated:  No   Wound Care Orders initiated:  No    Pressure Injury (Stage 3,4, Unstageable, DTI, NWPT, and Complex wounds) if present place consult order under [de-identified] No    New and Established Ostomies if present place consult order under : No      Nurse 1 eSignature: Electronically signed by Raghavendra Chapa RN on 4/9/22 at 10:07 PM EDT    **SHARE this note so that the co-signing nurse is able to place an eSignature**    Nurse 2 eSignature: Electronically signed by Wolf Ospina RN on 4/9/22 at 10:44 PM EDT

## 2022-04-11 ENCOUNTER — APPOINTMENT (OUTPATIENT)
Dept: CT IMAGING | Age: 42
DRG: 231 | End: 2022-04-11
Payer: COMMERCIAL

## 2022-04-11 ENCOUNTER — ANESTHESIA EVENT (OUTPATIENT)
Dept: ENDOSCOPY | Age: 42
DRG: 231 | End: 2022-04-11
Payer: COMMERCIAL

## 2022-04-11 ENCOUNTER — ANESTHESIA (OUTPATIENT)
Dept: ENDOSCOPY | Age: 42
DRG: 231 | End: 2022-04-11
Payer: COMMERCIAL

## 2022-04-11 VITALS
SYSTOLIC BLOOD PRESSURE: 142 MMHG | RESPIRATION RATE: 18 BRPM | OXYGEN SATURATION: 99 % | DIASTOLIC BLOOD PRESSURE: 74 MMHG

## 2022-04-11 PROBLEM — K62.5 RECTAL BLEEDING: Status: ACTIVE | Noted: 2022-04-11

## 2022-04-11 LAB
C TRACH DNA GENITAL QL NAA+PROBE: NEGATIVE
HCG(URINE) PREGNANCY TEST: NEGATIVE
HEMATOLOGY PATH CONSULT: NORMAL
N. GONORRHOEAE DNA: NEGATIVE

## 2022-04-11 PROCEDURE — 7100000000 HC PACU RECOVERY - FIRST 15 MIN: Performed by: INTERNAL MEDICINE

## 2022-04-11 PROCEDURE — 74160 CT ABDOMEN W/CONTRAST: CPT

## 2022-04-11 PROCEDURE — 6370000000 HC RX 637 (ALT 250 FOR IP): Performed by: INTERNAL MEDICINE

## 2022-04-11 PROCEDURE — 2709999900 HC NON-CHARGEABLE SUPPLY: Performed by: INTERNAL MEDICINE

## 2022-04-11 PROCEDURE — 6360000002 HC RX W HCPCS: Performed by: INTERNAL MEDICINE

## 2022-04-11 PROCEDURE — 1200000000 HC SEMI PRIVATE

## 2022-04-11 PROCEDURE — 3609010300 HC COLONOSCOPY W/BIOPSY SINGLE/MULTIPLE: Performed by: INTERNAL MEDICINE

## 2022-04-11 PROCEDURE — 88305 TISSUE EXAM BY PATHOLOGIST: CPT

## 2022-04-11 PROCEDURE — 2580000003 HC RX 258: Performed by: INTERNAL MEDICINE

## 2022-04-11 PROCEDURE — 3700000000 HC ANESTHESIA ATTENDED CARE: Performed by: INTERNAL MEDICINE

## 2022-04-11 PROCEDURE — 84703 CHORIONIC GONADOTROPIN ASSAY: CPT

## 2022-04-11 PROCEDURE — 7100000001 HC PACU RECOVERY - ADDTL 15 MIN: Performed by: INTERNAL MEDICINE

## 2022-04-11 PROCEDURE — 3700000001 HC ADD 15 MINUTES (ANESTHESIA): Performed by: INTERNAL MEDICINE

## 2022-04-11 PROCEDURE — 3609019800 HC COLONOSCOPY WITH SUBMUCOSAL INJECTION: Performed by: INTERNAL MEDICINE

## 2022-04-11 PROCEDURE — 0DBN8ZX EXCISION OF SIGMOID COLON, VIA NATURAL OR ARTIFICIAL OPENING ENDOSCOPIC, DIAGNOSTIC: ICD-10-PCS | Performed by: INTERNAL MEDICINE

## 2022-04-11 PROCEDURE — 6370000000 HC RX 637 (ALT 250 FOR IP): Performed by: NURSE PRACTITIONER

## 2022-04-11 PROCEDURE — 6360000002 HC RX W HCPCS

## 2022-04-11 PROCEDURE — 6360000004 HC RX CONTRAST MEDICATION: Performed by: INTERNAL MEDICINE

## 2022-04-11 RX ORDER — SODIUM CHLORIDE 0.9 % (FLUSH) 0.9 %
5-40 SYRINGE (ML) INJECTION PRN
Status: CANCELLED | OUTPATIENT
Start: 2022-04-11

## 2022-04-11 RX ORDER — PROPOFOL 10 MG/ML
INJECTION, EMULSION INTRAVENOUS CONTINUOUS PRN
Status: DISCONTINUED | OUTPATIENT
Start: 2022-04-11 | End: 2022-04-11 | Stop reason: SDUPTHER

## 2022-04-11 RX ORDER — DIPHENHYDRAMINE HYDROCHLORIDE 50 MG/ML
12.5 INJECTION INTRAMUSCULAR; INTRAVENOUS
Status: CANCELLED | OUTPATIENT
Start: 2022-04-11 | End: 2022-04-11

## 2022-04-11 RX ORDER — PROPOFOL 10 MG/ML
INJECTION, EMULSION INTRAVENOUS PRN
Status: DISCONTINUED | OUTPATIENT
Start: 2022-04-11 | End: 2022-04-11 | Stop reason: SDUPTHER

## 2022-04-11 RX ORDER — ONDANSETRON 2 MG/ML
4 INJECTION INTRAMUSCULAR; INTRAVENOUS
Status: CANCELLED | OUTPATIENT
Start: 2022-04-11 | End: 2022-04-11

## 2022-04-11 RX ORDER — SODIUM CHLORIDE 0.9 % (FLUSH) 0.9 %
5-40 SYRINGE (ML) INJECTION EVERY 12 HOURS SCHEDULED
Status: CANCELLED | OUTPATIENT
Start: 2022-04-11

## 2022-04-11 RX ORDER — SODIUM CHLORIDE 9 MG/ML
INJECTION, SOLUTION INTRAVENOUS PRN
Status: CANCELLED | OUTPATIENT
Start: 2022-04-11

## 2022-04-11 RX ADMIN — PROPOFOL 100 MG: 10 INJECTION, EMULSION INTRAVENOUS at 12:54

## 2022-04-11 RX ADMIN — SODIUM CHLORIDE, PRESERVATIVE FREE 10 ML: 5 INJECTION INTRAVENOUS at 08:23

## 2022-04-11 RX ADMIN — Medication 12.5 MG: at 11:12

## 2022-04-11 RX ADMIN — ONDANSETRON 4 MG: 2 INJECTION INTRAMUSCULAR; INTRAVENOUS at 21:09

## 2022-04-11 RX ADMIN — SODIUM CHLORIDE: 9 INJECTION, SOLUTION INTRAVENOUS at 18:06

## 2022-04-11 RX ADMIN — METRONIDAZOLE 500 MG: 500 TABLET ORAL at 08:23

## 2022-04-11 RX ADMIN — IRON SUCROSE 200 MG: 20 INJECTION, SOLUTION INTRAVENOUS at 12:04

## 2022-04-11 RX ADMIN — IOPAMIDOL 75 ML: 755 INJECTION, SOLUTION INTRAVENOUS at 09:26

## 2022-04-11 RX ADMIN — METRONIDAZOLE 500 MG: 500 TABLET ORAL at 21:09

## 2022-04-11 RX ADMIN — DOXYCYCLINE HYCLATE 100 MG: 100 TABLET, FILM COATED ORAL at 21:09

## 2022-04-11 RX ADMIN — PROPOFOL 140 MCG/KG/MIN: 10 INJECTION, EMULSION INTRAVENOUS at 12:54

## 2022-04-11 RX ADMIN — ONDANSETRON 4 MG: 2 INJECTION INTRAMUSCULAR; INTRAVENOUS at 08:23

## 2022-04-11 RX ADMIN — DOXYCYCLINE HYCLATE 100 MG: 100 TABLET, FILM COATED ORAL at 08:23

## 2022-04-11 RX ADMIN — DICYCLOMINE HYDROCHLORIDE 20 MG: 10 CAPSULE ORAL at 08:23

## 2022-04-11 ASSESSMENT — PAIN - FUNCTIONAL ASSESSMENT
PAIN_FUNCTIONAL_ASSESSMENT: 0-10
PAIN_FUNCTIONAL_ASSESSMENT: PREVENTS OR INTERFERES SOME ACTIVE ACTIVITIES AND ADLS
PAIN_FUNCTIONAL_ASSESSMENT: PREVENTS OR INTERFERES SOME ACTIVE ACTIVITIES AND ADLS

## 2022-04-11 ASSESSMENT — PAIN DESCRIPTION - LOCATION: LOCATION: ABDOMEN

## 2022-04-11 ASSESSMENT — PAIN DESCRIPTION - DESCRIPTORS
DESCRIPTORS: CRAMPING
DESCRIPTORS: CRAMPING

## 2022-04-11 ASSESSMENT — PULMONARY FUNCTION TESTS
PIF_VALUE: 1

## 2022-04-11 ASSESSMENT — LIFESTYLE VARIABLES: SMOKING_STATUS: 0

## 2022-04-11 ASSESSMENT — PAIN DESCRIPTION - ONSET: ONSET: ON-GOING

## 2022-04-11 ASSESSMENT — PAIN DESCRIPTION - FREQUENCY: FREQUENCY: CONTINUOUS

## 2022-04-11 ASSESSMENT — PAIN DESCRIPTION - PROGRESSION: CLINICAL_PROGRESSION: NOT CHANGED

## 2022-04-11 ASSESSMENT — PAIN DESCRIPTION - ORIENTATION: ORIENTATION: LOWER;LEFT

## 2022-04-11 ASSESSMENT — PAIN DESCRIPTION - PAIN TYPE: TYPE: ACUTE PAIN

## 2022-04-11 ASSESSMENT — PAIN SCALES - GENERAL
PAINLEVEL_OUTOF10: 4
PAINLEVEL_OUTOF10: 0

## 2022-04-11 ASSESSMENT — ENCOUNTER SYMPTOMS: SHORTNESS OF BREATH: 0

## 2022-04-11 NOTE — PROGRESS NOTES
Pt has been drinking golytely prep before colonoscopy.  Pt has had 4 bowel movments with the most recent being thin and clear

## 2022-04-11 NOTE — ANESTHESIA PRE PROCEDURE
Department of Anesthesiology  Preprocedure Note       Name:  Kika Louie   Age:  36 y.o.  :  1980                                          MRN:  [id number]         Date:  2022      Surgeon: Delores Garcia):  Davy Crane MD    Procedure: Procedure(s):  COLONOSCOPY DIAGNOSTIC    Medications prior to admission:   Prior to Admission medications    Not on File       Current medications:    Current Facility-Administered Medications   Medication Dose Route Frequency Provider Last Rate Last Admin    promethazine (PHENERGAN) 12.5mg in sodium chloride 0.9% 50 mL IVPB 12.5 mg  12.5 mg IntraVENous Q6H PRN Rain Delacruz MD   Stopped at 22 1204    iron sucrose (VENOFER) injection 200 mg  200 mg IntraVENous Q24H Ammy Cali MD   200 mg at 22 1204    sodium chloride flush 0.9 % injection 5-40 mL  5-40 mL IntraVENous 2 times per day Faith Jewell MD   10 mL at 22 0823    sodium chloride flush 0.9 % injection 5-40 mL  5-40 mL IntraVENous PRN Faith Jewell MD        0.9 % sodium chloride infusion   IntraVENous PRN Faith Jewell MD        ondansetron (ZOFRAN-ODT) disintegrating tablet 4 mg  4 mg Oral Q8H PRN Faith Jewell MD        Or    [facility] injection 4 mg  4 mg IntraVENous Q6H PRN Faith Jewell MD   4 mg at 22 0823    acetaminophen (TYLENOL) tablet 650 mg  650 mg Oral Q6H PRN Faith Jewell MD   650 mg at 22 2017    Or    acetaminophen (TYLENOL) suppository 650 mg  650 mg Rectal Q6H PRN Faith Jewell MD        0.9 % sodium chloride infusion   IntraVENous Continuous Faith Jewell MD 75 mL/hr at 22 1154 NoRateChange at 22 1154    metroNIDAZOLE (FLAGYL) tablet 500 mg  500 mg Oral 2 times per day Jarad Santo APRN - CNP   500 mg at 22 0823    dicyclomine (BENTYL) capsule 20 mg  20 mg Oral Q6H PRN Jarad Santo APRLESIA - CNP   20 mg at 22 0823    doxycycline hyclate (VIBRA-TABS) tablet 100 mg  100 mg Oral 2 times per day Matthew Heimlich S Puthoff, APRN - CNP   100 mg at 04/11/22 4657       Allergies: Allergies   Allergen Reactions    Codeine Hives    Pcn [Penicillins] Hives       Problem List:    Patient Active Problem List   Diagnosis Code    Lower GI bleed K92.2    Trichimoniasis A59.9    Liver lesion K76.9    Colonic mass K63.89       Past Medical History:        Diagnosis Date    Asthma        Past Surgical History:  History reviewed. No pertinent surgical history. Social History:    Social History     Tobacco Use    Smoking status: Never Smoker    Smokeless tobacco: Never Used   Substance Use Topics    Alcohol use: Yes     Comment: socially                                Counseling given: Not Answered      Vital Signs (Current):   Vitals:    04/10/22 2115 04/11/22 0400 04/11/22 0946 04/11/22 1228   BP: 126/73 135/66 139/73 134/70   Pulse: 74 80 66 61   Resp: 18 18 16 16   Temp: 98.7 °F (37.1 °C) 98.6 °F (37 °C) 98.1 °F (36.7 °C) 97.3 °F (36.3 °C)   TempSrc: Oral Oral Oral Temporal   SpO2: 95%  100% 97%   Weight:  225 lb (102.1 kg)     Height:  5' 6\" (1.676 m)                                                BP Readings from Last 3 Encounters:   04/11/22 134/70   12/23/19 117/71   06/23/19 127/78       NPO Status: Time of last liquid consumption: 2300                        Time of last solid consumption: 2300                        Date of last liquid consumption: 04/10/22                        Date of last solid food consumption: 04/10/22    BMI:   Wt Readings from Last 3 Encounters:   04/11/22 225 lb (102.1 kg)   12/23/19 217 lb 13 oz (98.8 kg)   06/23/19 217 lb 9.5 oz (98.7 kg)     Body mass index is 36.32 kg/m².     CBC:   Lab Results   Component Value Date    WBC 9.6 04/10/2022    RBC 3.37 04/10/2022    HGB 10.9 04/10/2022    HCT 32.3 04/10/2022    MCV 95.7 04/10/2022    RDW 13.3 04/10/2022     04/10/2022       CMP:   Lab Results   Component Value Date     04/09/2022    K 4.0 04/09/2022     04/09/2022    CO2 20 04/09/2022    BUN 12 04/09/2022    CREATININE 0.9 04/09/2022    GFRAA >60 04/09/2022    GFRAA >60 06/06/2013    AGRATIO 1.3 04/09/2022    LABGLOM >60 04/09/2022    GLUCOSE 98 04/09/2022    PROT 7.0 04/09/2022    CALCIUM 9.9 04/09/2022    BILITOT 0.6 04/09/2022    ALKPHOS 77 04/09/2022    AST 53 04/09/2022    ALT 25 04/09/2022       POC Tests: No results for input(s): POCGLU, POCNA, POCK, POCCL, POCBUN, POCHEMO, POCHCT in the last 72 hours. Coags:   Lab Results   Component Value Date    PROTIME 11.5 01/05/2014    INR 1.03 01/05/2014       HCG (If Applicable):   Lab Results   Component Value Date    PREGTESTUR Negative 04/11/2022        ABGs: No results found for: PHART, PO2ART, FPL7GYT, VIR0TLN, BEART, O4LWZNAS     Type & Screen (If Applicable):  Lab Results   Component Value Date    LABABO O 06/06/2013    [address] Positive 06/06/2013       Drug/Infectious Status (If Applicable):  No results found for: HIV, HEPCAB    COVID-19 Screening (If Applicable):   Lab Results   Component Value Date    COVID19 Not Detected 04/09/2022           Anesthesia Evaluation  Patient summary reviewed no history of anesthetic complications:   Airway: Mallampati: III  TM distance: >3 FB   Neck ROM: full  Mouth opening: > = 3 FB Dental: normal exam         Pulmonary:Negative Pulmonary ROS       (-) shortness of breath and not a current smoker          Patient did not smoke on day of surgery. Cardiovascular:Negative CV ROS        (-) pacemaker, past MI, CABG/stent and  angina       Beta Blocker:  Not on Beta Blocker         Neuro/Psych:   Negative Neuro/Psych ROS     (-) seizures and CVA           GI/Hepatic/Renal:        (-) liver disease and no renal disease      ROS comment: GI bleeding, colonic mass. Endo/Other: Negative Endo/Other ROS       (-) diabetes mellitus, hypothyroidism, hyperthyroidism               Abdominal:   (+) obese,           Vascular: negative vascular ROS.          Other Findings:

## 2022-04-11 NOTE — ANESTHESIA POSTPROCEDURE EVALUATION
Department of Anesthesiology  Postprocedure Note    Patient: Yin Javed  MRN: [id number]  [place]: 1980  Date of evaluation: 4/11/2022  Time:  4:46 PM     Procedure Summary     Date: 04/11/22 Room / Location: [address] [place] [address]    Anesthesia Start: 4964 Anesthesia Stop: 9604    Procedures:       COLONOSCOPY WITH BIOPSY (N/A )      COLONOSCOPY SUBMUCOSAL tattoo  INJECTION Diagnosis: (Colon mass)    Surgeons: Geena Hanley MD Responsible Provider: Narendra Hong MD    Anesthesia Type: MAC ASA Status: 2          Anesthesia Type: MAC    Sisi Phase I: Sisi Score: 10    Sisi Phase II:      Last vitals: Reviewed and per EMR flowsheets.        Anesthesia Post Evaluation    Patient location during evaluation: bedside  Patient participation: complete - patient participated  Level of consciousness: awake and alert  Pain score: 0  Nausea & Vomiting: no nausea  Complications: no  Cardiovascular status: hemodynamically stable  Respiratory status: acceptable  Hydration status: stable

## 2022-04-11 NOTE — PROGRESS NOTES
Comprehensive Nutrition Assessment    Type and Reason for Visit:  Initial,Positive Nutrition Screen    Nutrition Recommendations/Plan:   - Advance diet as appropriate  - Monitor intakes for possible need of ONS    Nutrition Assessment:  Positive nutrition screen. Pt admitted with abdominal pain and rectal bleeding. Pt with colon mass and multiple liver lesions. NPO from colonoscopy today. No wt hx provided in EMR. Will monitor intakes when diet advances for possible need of ONS. Malnutrition Assessment:  Malnutrition Status:  Insufficient data    Context:  Chronic Illness       Estimated Daily Nutrient Needs:  Energy (kcal):  0722-2782 (11-14kcal/102kg); Weight Used for Energy Requirements:  Current     Protein (g):  71-83 (1.2-1.4g/59kg); Weight Used for Protein Requirements:  Ideal        Fluid (ml/day):  1 ml/kcal      Nutrition Related Findings:  +BM 4/11. No edema.       Wounds:  None       Current Nutrition Therapies:    Diet NPO Exceptions are: Ice Chips    Anthropometric Measures:  · Height: 5' 6\" (167.6 cm)  · Current Body Weight: 225 lb (102.1 kg)   · Admission Body Weight: 225 lb (102.1 kg)    · Ideal Body Weight: 130 lbs; % Ideal Body Weight 173.1 %   · BMI: 36.3  · BMI Categories: Obese Class 2 (BMI 35.0 -39.9)       Nutrition Diagnosis:   · Inadequate oral intake related to inadequate protein-energy intake as evidenced by NPO or clear liquid status due to medical condition    Nutrition Interventions:   Food and/or Nutrient Delivery:   (Advance diet as appropriate)  Nutrition Education/Counseling:  Education not indicated   Coordination of Nutrition Care:  Continue to monitor while inpatient    Goals:  Diet advancement with intakes >50%       Nutrition Monitoring and Evaluation:   Behavioral-Environmental Outcomes:  None Identified   Food/Nutrient Intake Outcomes:  Diet Advancement/Tolerance,Food and Nutrient Intake  Physical Signs/Symptoms Outcomes:  Biochemical Data,GI Status,Nausea or Vomiting,Weight     Discharge Planning:     Too soon to determine     Electronically signed by Brenden Hernandez RD, LD on 4/11/22 at 2:27 PM EDT    Contact: [phone]

## 2022-04-11 NOTE — OP NOTE
Colonoscopy Procedure Note      Patient: Mayuri Arevalo  : 1980  Acct#:     Procedure: Flexible Sigmoidoscopy with biopsy    Date:  2022    Surgeon:  Marc Quinonez MD    Referring Physician:  No primary care provider on file. Previous Colonoscopy: NO  Date: N/A  Greater than 3 years: N/A    Preoperative Diagnosis:  1. Colon Mass     Postoperative Diagnosis:  1. Obstructing circumferential malignant appearing mass was present at 70 cm from the anal verge. Multiple biopsies obtained from the lesion. A tattoo was placed distal with approximately 2 ccs of [place] ink. Consent:  The patient or their legal guardian has signed a consent, and is aware of the potential risks, benefits, alternatives, and potential complications of this procedure. These include, but are not limited to hemorrhage, bleeding, post procedural pain, perforation, phlebitis, aspiration, hypotension, hypoxia, cardiovascular events such as arryhthmia, and possibly death. Additionally, the possibility of missed colonic polyps and interval colon cancer was discussed in the consent. Anesthesia: The patient was administered IV propofol per anesthesiology team.  Please see their operative records for full details. Procedure: An informed consent was obtained from the patient after explanation of indications, benefits, possible risks and complications of the procedure. The patient was then taken to the endoscopy suite, placed in the left lateral decubitus position, and the above IV anesthesia was administered. A digital rectal examination was performed and revealed negative without mass, lesions or tenderness. The Olympus video colonoscope was placed in the patient's rectum under digital direction and advanced to approximately 70 cm. The preparation was fair. The scope was then withdrawn back through transverse, descending, sigmoid colon, and rectum.   Careful circumferential examination of the mucosa in these areas demonstrated:    1. The colonoscope was advanced to approximately 70 cm until an obstructing circumferential malignant appearing mass was present. Multiple biopsies obtained from the lesion. A tattoo was placed distal with approximately 2 ccs of [place] ink. The remaining descending and sigmoid colon were normal appearing. The scope was then withdrawn into the rectum and retroflexed. The retroflexed view of the anal verge and rectum demonstrates normal rectum. The scope was straightened, the colon was decompressed and the scope was withdrawn from the patient. The patient tolerated the procedure well and was taken to the PACU in good condition. Estimated Blood Loss (mL): < 5 CC     Complications: None    Specimens:   ID Type Source Tests Collected by Time Destination   A : biopsy colon mass Tissue Colon SURGICAL PATHOLOGY Shelbi Garza MD 4/11/2022 1252      Impression:  See post-procedure diagnoses. Recommendations:    1. Await pathology results. Reinaldo Wagner [place] [address] for regular diet from from a GI standpoint.      DREA [address]  4/11/2022  [phone]

## 2022-04-11 NOTE — PLAN OF CARE
Problem: Nutrition  Goal: Optimal nutrition therapy  Outcome: Ongoing     Nutrition Problem #1: Inadequate oral intake  Intervention: Food and/or Nutrient Delivery:  (Advance diet as appropriate)  Nutritional Goals: Diet advancement with intakes >50%

## 2022-04-11 NOTE — PLAN OF CARE
Problem: Pain:  Goal: Pain level will decrease  Description: Pain level will decrease  4/11/2022 1252 by Estefany Wilburn RN  Outcome: Ongoing  Note: Educated patient on pain management. Will assess patients pain level per unit protocol, and provide pain management measures as needed. Electronically signed by Estefany Wilburn RN on 4/11/2022 at 12:52 PM      Problem: Safety:  Goal: Free from accidental physical injury  Description: Free from accidental physical injury  4/11/2022 1252 by Estefany Wilburn RN  Outcome: Ongoing  Note: Bed in lowest position, wheels locked, bed/chair exit alarm in place, call light within reach, and non skid footwear on. Walkway free of clutter. Pt alert and oriented and able to make needs known. Pt educated to use call light when needing to get up, and pt utilizes call light to make needs known. Will continue to monitor. Electronically signed by Estefany Wilburn RN on 4/11/2022 at 12:52 PM      Problem: Daily Care:  Goal: Daily care needs are met  Description: Daily care needs are met  4/11/2022 1252 by Estefany Wilburn RN  Outcome: Ongoing  Note: Patient assessed to determine ability to perform daily needs and ADLs. Patient assisted with any daily needs that were not able to be met individually by patient. Garner encouraged, although patient educated to ask for assistance when needed. Will continue to monitor and assist patient with meeting daily care needs as needed. Electronically signed by Estefany Wilburn RN on 4/11/2022 at 12:52 PM      Problem: Discharge Planning:  Goal: Patients continuum of care needs are met  Description: Patients continuum of care needs are met  4/11/2022 1252 by Estefany Wilburn RN  Outcome: Ongoing  Note: Assessed patients knowledge of discharge. Will continue to work with patient on discharge planning and answer patient questions. Will consult case management and  as necessary.  Electronically signed by Estefany Wilburn RN on 4/11/2022 at 12:52 PM

## 2022-04-11 NOTE — PROGRESS NOTES
New IV placed in right forearm. IV Zofran given prior to giving PO medications to help prevent nausea. Bentyl given for abdominal cramping. Patient currently working on bowel prep for colonoscopy this afternoon. Patient made aware of colonoscopy scheduled for 1300 today. NPO after 0900. Family at bedside. Patient ambulates independently and able to make needs known. Will continue to monitor.      Electronically signed by Estefany Wilburn RN on 4/11/2022 at 10:43 AM

## 2022-04-11 NOTE — H&P
Pre-operative History and Physical    Patient: Elva Bajwa  : 1980  Acct#:     Intended Procedure:  Colonoscopy    HISTORY OF PRESENT ILLNESS:  The patient is a 36 y.o. female  who presents for/due to Colon Mass     Past Medical History:        Diagnosis Date    Asthma      Past Surgical History:    History reviewed. No pertinent surgical history. Medications Prior to Admission:   Prior to Admission medications    Not on File       Allergies:  Codeine and Pcn [penicillins]    Social History:   TOBACCO:   reports that she has never smoked. She has never used smokeless tobacco.  ETOH:   reports current alcohol use. DRUGS:   reports no history of drug use. PHYSICAL EXAM:      Vital Signs: /66   Pulse 80   Temp 98.6 °F (37 °C) (Oral)   Resp 18   Ht 5' 6\" (1.676 m)   Wt 225 lb (102.1 kg)   LMP 2022 (Approximate)   SpO2 95%   BMI 36.32 kg/m²    Airway: No stridor or wheezing noted. Good air movement  Pulmonary: without wheezes. Clear to auscultation  Cardiac:regular rate and rhythm without loud murmurs  Abdomen:soft, nontender,  Bowel sounds present    Pre-Procedure Assessment / Plan:  1) Colonoscopy    ASA Grade:  ASA 2 - Patient with mild systemic disease with no functional limitations  Mallampati Classification:  Class III    Level of Sedation Plan: Deep Sedation    Post Procedure plan: Return to same level of care    I assessed the patient and find that the patient is in satisfactory condition to proceed with the planned procedure and sedation plan. I have explained the risk, benefits, and alternatives to the procedure; the patient understands and agrees to proceed.        Manoj Trivedi MD  2022

## 2022-04-11 NOTE — CARE COORDINATION
INITIAL CASE MANAGEMENT ASSESSMENT     Reviewed chart, met with patient to assess possible discharge needs. Explained Case Management role/services. SW interviewed patient alone at bedside today.      Living Situation: Patient resides in a single family home with three of her children and no pets. There are four stairs leading up to the front door. Prior to medical admission she stated that she had no trouble getting in and out of the property.      ADLs: Prior to medical admission patient reports that she reports that she was independent. She stated that she doesn't anticipate any needs at discharge.      DME: Prior to medical admission patient reports that she used no durable medical equipment. She stated that she doesn't anticipate any needs at discharge.      PT/OT Recs: Not ordered. Active Services: Prior to medical admission patient reports that she had no active services in place. She stated that she doesn't anticipate any needs at discharge.      Transportation: Prior to medical admission patient reports that she was an active . She stated that her daughter will likely transport her home at discharge.     Medications: Patient receives long term medications from [address] [place] [address] on [address] this time there are no issues with access or affordability.      PCP:  Patient reports that she is followed by the [facility] located on [address] [facility] in [facility]. Her last appointment with this provider was 2021 according to patient self report.   Dulce Pastrana  HD/PD: N/A     PLAN/COMMENTS:   1) Waiting on GI clearance. 2) Work note needed per patient. 3) Discharge to home with family.      SW provided contact information for patient or family to call with any questions. SW will follow and assist as needed.     Respectfully submitted,     ANALILIA ColónS  [facility]   [phone]    Electronically signed by PRASANNA Conklin on 4/11/2022 at 12:05 PM

## 2022-04-11 NOTE — PLAN OF CARE
Problem: Pain:  Goal: Pain level will decrease  Description: Pain level will decrease  4/11/2022 0032 by Darryle Shave, RN  Outcome: Ongoing    Goal: Control of acute pain  Description: Control of acute pain  4/11/2022 0032 by Darryle Shave, RN  Outcome: Ongoing    Goal: Control of chronic pain  Description: Control of chronic pain  4/11/2022 0032 by Darryle Shave, RN  Outcome: Ongoing       Problem: Safety:  Goal: Free from accidental physical injury  Description: Free from accidental physical injury  4/11/2022 0032 by Darryle Shave, RN  Outcome: Ongoing    Goal: Free from intentional harm  Description: Free from intentional harm  4/11/2022 0032 by Darryle Shave, RN  Outcome: Ongoing  4/10/2022 1131 by Tyrone Honeycutt RN  Outcome: Ongoing     Problem: Daily Care:  Goal: Daily care needs are met  Description: Daily care needs are met  4/11/2022 0032 by Darryle Shave, RN  Outcome: Ongoing       Problem: Discharge Planning:  Goal: Patients continuum of care needs are met  Description: Patients continuum of care needs are met  4/11/2022 0032 by Darryle Shave, RN  Outcome: Ongoing

## 2022-04-11 NOTE — PROGRESS NOTES
Patient had few episodes of emesis. Patient believes it is related to IV contrast from CT. Only PRN Antiemetic is Zofran. Not due for Zofran at this time. Secure message sent to Dr. Patti Arana. Waiting for response.

## 2022-04-11 NOTE — PROGRESS NOTES
Hospitalist Progress Note      PCP: No primary care provider on file. Date of Admission: 4/9/2022    Subjective: still with nausea, NPO for colonoscopy today    Medications:  Reviewed    Infusion Medications    sodium chloride      sodium chloride Stopped (04/11/22 1304)     Scheduled Medications    iron sucrose  200 mg IntraVENous Q24H    sodium chloride flush  5-40 mL IntraVENous 2 times per day    metroNIDAZOLE  500 mg Oral 2 times per day    doxycycline hyclate  100 mg Oral 2 times per day     PRN Meds: promethazine, sodium chloride flush, sodium chloride, ondansetron **OR** ondansetron, acetaminophen **OR** acetaminophen, dicyclomine      Intake/Output Summary (Last 24 hours) at 4/11/2022 1647  Last data filed at 4/11/2022 1304  Gross per 24 hour   Intake 250 ml   Output 200 ml   Net 50 ml       Physical Exam Performed:    /70   Pulse 52   Temp 98.7 °F (37.1 °C) (Oral)   Resp 16   Ht 5' 6\" (1.676 m)   Wt 225 lb (102.1 kg)   LMP 03/30/2022 (Approximate)   SpO2 100%   BMI 36.32 kg/m²       General appearance: No apparent distress appears stated age and cooperative. Lungs: Clear to auscultation, bilaterally without Rales/Wheezes/Rhonchi with good respiratory effort. Heart: Regular rate and rhythm with Normal S1/S2 without murmurs, rubs or gallops, point of maximum impulse non-displaced  Abdomen: Soft, non-tender or non-distended without rigidity or guarding and positive bowel sounds   Extremities: No clubbing, cyanosis, or edema bilaterally.    Skin: Skin color, texture, turgor normal.  No rashes or lesions. Neurologic: Alert and oriented X 3, neurovascularly intact with sensory/motor intact upper extremities/lower extremities, bilaterally.  Cranial nerves: II-XII intact, grossly non-focal.  Mental status: Alert, oriented, thought content appropriate.   Capillary Refill: Acceptable  < 3 seconds  Peripheral Pulses: +3 Easily felt, not easily obliterated with pressure       Labs: Recent Labs     04/09/22  1105 04/10/22  0713   WBC 12.8* 9.6   HGB 11.8* 10.9*   HCT 34.9* 32.3*    234     Recent Labs     04/09/22  1105      K 4.0      CO2 20*   BUN 12   CREATININE 0.9   CALCIUM 9.9     Recent Labs     04/09/22  1105   AST 53*   ALT 25   BILITOT 0.6   ALKPHOS 77     No results for input(s): INR in the last 72 hours. No results for input(s): Shavon Eagle in the last 72 hours. Urinalysis:      Lab Results   Component Value Date    NITRU Negative 04/09/2022    WBCUA 21-50 04/09/2022    BACTERIA 2+ 04/09/2022    RBCUA 3-4 04/09/2022    BLOODU SMALL 04/09/2022    SPECGRAV 1.020 04/09/2022    GLUCOSEU Negative 04/09/2022       Radiology:  CT ABDOMEN W CONTRAST Additional Contrast? Radiologist Recommendation   Final Result   1. Disseminated hepatic metastatic disease. 2. Nonspecific distal esophageal wall thickening. Endoscopy recommended to   further evaluate. Neoplasm is a diagnostic consideration. RECOMMENDATIONS:   Unavailable         CT ABDOMEN PELVIS WO CONTRAST Additional Contrast? None   Final Result   1. Multiple hepatic lesions most consistent with metastatic disease. Findings concerning for an annular mass in the distal transverse colon,   likely a colonic primary with adjacent adenopathy. 2. No other acute findings within the abdomen or pelvis. Assessment/Plan:    Active Hospital Problems    Diagnosis     Rectal bleeding [K62.5]     Lower GI bleed [K92.2]     Trichimoniasis [A59.9]     Liver lesion [K76.9]     Colonic mass [K63.89]             Generalized abdominal pain: Onset 4 to 6 weeks ago  CT abdomen and pelvis indicating liver lesions and transverse colon mass: This is obviously concerning for malignancy scenario  No free air.  No free fluid. CT results were discussed with the patient. GI consulted  WBC 12.8 with a left shift of 9. 2(could be related to trichomoniasis, potential development of PID and or UTI)        Lower GI bleed: Hemoccult positive  Colon mass as identified on CT  GI consulted  Hold prophylactic anticoagulation  H&H stable   Patient has never had a colonoscopy. Had prep overnight, awaiting colonoscopy with biopsy today     Anemia - iron def anemia, occult positive. Started on IV venofer, GI consulted     Liver lesion: As evidenced on CT incidental finding  Liver enzymes unremarkable, AST slightly elevated at 53. GI consulted     Trichomonas: Positive UA  Flagyl 500 mg p.o. twice daily x7 days trichomoniasis treatment  GC pending  Should have outpatient GYN follow-up=> 2 weeks  I did express to her and to the boyfriend that he also requires treatment otherwise they will reinfect each other if he is not treated  With lower abdominal pain certainly PID should be considered, again GC is pending. Added Doxycycline till GC results available.   CT does not indicate any evidence of tubo-ovarian abscess. WBC 12.8, T-max 100. 2        Bacterial vaginosis: Clue cells on wet prep  Treatment: Flagyl-> continue current dose     UTI: Micro UA WBC 21-50 and 2+ bacteria, epithelial cells 21-50  Rocephin received in ED  Await culture for any further antibiotic coverage as this appears to be a contaminated specimen.  And the patient did not c/o dysuria.              DVT prophylaxis - SCD  Diet: ADULT DIET;  Regular  Code Status: Full Code    PT/OT Eval Status: ordered    Dispo - cont care, await colonoscopy today    Ton Rodriguez MD

## 2022-04-12 LAB
AFP: 2.8 UG/L
CA 19-9: 33 U/ML (ref 0–35)

## 2022-04-12 PROCEDURE — 91305 HC RX W HCPCS: CPT | Performed by: INTERNAL MEDICINE

## 2022-04-12 PROCEDURE — 6360000002 HC RX W HCPCS: Performed by: INTERNAL MEDICINE

## 2022-04-12 PROCEDURE — 1200000000 HC SEMI PRIVATE

## 2022-04-12 PROCEDURE — 2580000003 HC RX 258: Performed by: INTERNAL MEDICINE

## 2022-04-12 PROCEDURE — 6370000000 HC RX 637 (ALT 250 FOR IP): Performed by: INTERNAL MEDICINE

## 2022-04-12 PROCEDURE — 99254 IP/OBS CNSLTJ NEW/EST MOD 60: CPT | Performed by: SURGERY

## 2022-04-12 PROCEDURE — 0051A: CPT | Performed by: INTERNAL MEDICINE

## 2022-04-12 PROCEDURE — 91305: CPT | Performed by: INTERNAL MEDICINE

## 2022-04-12 RX ORDER — PANTOPRAZOLE SODIUM 40 MG/1
40 TABLET, DELAYED RELEASE ORAL
Status: DISCONTINUED | OUTPATIENT
Start: 2022-04-12 | End: 2022-04-19 | Stop reason: HOSPADM

## 2022-04-12 RX ORDER — CIPROFLOXACIN 2 MG/ML
400 INJECTION, SOLUTION INTRAVENOUS EVERY 12 HOURS
Status: DISCONTINUED | OUTPATIENT
Start: 2022-04-13 | End: 2022-04-13

## 2022-04-12 RX ADMIN — IRON SUCROSE 200 MG: 20 INJECTION, SOLUTION INTRAVENOUS at 11:36

## 2022-04-12 RX ADMIN — SODIUM CHLORIDE, PRESERVATIVE FREE 10 ML: 5 INJECTION INTRAVENOUS at 08:35

## 2022-04-12 RX ADMIN — PANTOPRAZOLE SODIUM 40 MG: 40 TABLET, DELAYED RELEASE ORAL at 11:36

## 2022-04-12 RX ADMIN — SODIUM CHLORIDE, PRESERVATIVE FREE 10 ML: 5 INJECTION INTRAVENOUS at 22:03

## 2022-04-12 RX ADMIN — BNT162B2 0.3 ML: 0.23 INJECTION, SUSPENSION INTRAMUSCULAR at 12:26

## 2022-04-12 RX ADMIN — ACETAMINOPHEN 650 MG: 325 TABLET ORAL at 22:01

## 2022-04-12 RX ADMIN — METRONIDAZOLE 500 MG: 500 TABLET ORAL at 08:34

## 2022-04-12 RX ADMIN — ONDANSETRON 4 MG: 2 INJECTION INTRAMUSCULAR; INTRAVENOUS at 22:01

## 2022-04-12 RX ADMIN — DOXYCYCLINE HYCLATE 100 MG: 100 TABLET, FILM COATED ORAL at 22:01

## 2022-04-12 RX ADMIN — ONDANSETRON 4 MG: 2 INJECTION INTRAMUSCULAR; INTRAVENOUS at 08:34

## 2022-04-12 RX ADMIN — SODIUM CHLORIDE: 9 INJECTION, SOLUTION INTRAVENOUS at 05:47

## 2022-04-12 RX ADMIN — DICYCLOMINE HYDROCHLORIDE 20 MG: 10 CAPSULE ORAL at 05:46

## 2022-04-12 RX ADMIN — ACETAMINOPHEN 650 MG: 325 TABLET ORAL at 15:16

## 2022-04-12 RX ADMIN — DOXYCYCLINE HYCLATE 100 MG: 100 TABLET, FILM COATED ORAL at 08:34

## 2022-04-12 RX ADMIN — METRONIDAZOLE 500 MG: 500 TABLET ORAL at 22:01

## 2022-04-12 ASSESSMENT — PAIN SCALES - GENERAL
PAINLEVEL_OUTOF10: 4
PAINLEVEL_OUTOF10: 5
PAINLEVEL_OUTOF10: 0
PAINLEVEL_OUTOF10: 0

## 2022-04-12 ASSESSMENT — PAIN DESCRIPTION - LOCATION: LOCATION: HEAD

## 2022-04-12 ASSESSMENT — PAIN DESCRIPTION - PAIN TYPE: TYPE: ACUTE PAIN

## 2022-04-12 ASSESSMENT — PAIN DESCRIPTION - DESCRIPTORS: DESCRIPTORS: HEADACHE

## 2022-04-12 NOTE — PROGRESS NOTES
INPATIENT PROGRESS NOTE        IDENTIFYING DATA/REASON FOR CONSULTATION   PATIENT:  Andrez Howell  MRN:  [id number]  ADMIT DATE: 2022  TIME OF EVALUATION: 2022 8:41 AM  HOSPITAL STAY:   LOS: 3 days   CONSULTING PHYSICIAN: Black Casillas MD   REASON FOR CONSULTATION: colon mass    Subjective:    Patient seen in follow up. Colonoscopy yesterday showed obstructing circumferential malignant appearing mass at 70 cm from the anal verge, biopsies obtained, tattoo was placed distal    MEDICATIONS   SCHEDULED:  iron sucrose, 200 mg, Q24H  sodium chloride flush, 5-40 mL, 2 times per day  metroNIDAZOLE, 500 mg, 2 times per day  doxycycline hyclate, 100 mg, 2 times per day      FLUIDS/DRIPS:     sodium chloride      sodium chloride 75 mL/hr at 22 0547     PRNs: promethazine, 12.5 mg, Q6H PRN  sodium chloride flush, 5-40 mL, PRN  sodium chloride, , PRN  ondansetron, 4 mg, Q8H PRN   Or  ondansetron, 4 mg, Q6H PRN  acetaminophen, 650 mg, Q6H PRN   Or  acetaminophen, 650 mg, Q6H PRN  dicyclomine, 20 mg, Q6H PRN      ALLERGIES:    Allergies   Allergen Reactions    Codeine Hives    Pcn [Penicillins] Hives         PHYSICAL EXAM   VITALS:  /68   Pulse 64   Temp 98.5 °F (36.9 °C) (Oral)   Resp 18   Ht 5' 6\" (1.676 m)   Wt 234 lb 5.6 oz (106.3 kg)   LMP 2022 (Approximate)   SpO2 100%   BMI 37.82 kg/m²   TEMPERATURE:  Current - Temp: 98.5 °F (36.9 °C); Max - Temp  Av.2 °F (36.8 °C)  Min: 97.3 °F (36.3 °C)  Max: 99.2 °F (37.3 °C)    Physical Exam:  General appearance: alert, cooperative, no distress, appears stated age  Eyes: Anicteric  Head: Normocephalic, without obvious abnormality  Lungs: clear to auscultation bilaterally, Normal Effort  Heart: regular rate and rhythm, normal S1 and S2, no murmurs or rubs  Abdomen: soft, non-distended, non-tender.  Bowel sounds normal.   Extremities: atraumatic, no cyanosis or edema  Skin: warm and dry, no jaundice  Neuro: Grossly intact, A&OX3    LABS AND IMAGING   Laboratory   Recent Labs     04/09/22  1105 04/10/22  0713   WBC 12.8* 9.6   HGB 11.8* 10.9*   HCT 34.9* 32.3*   MCV 95.9 95.7    234     Recent Labs     04/09/22  1105      K 4.0      CO2 20*   BUN 12   CREATININE 0.9     Recent Labs     04/09/22  1105   AST 53*   ALT 25   BILITOT 0.6   ALKPHOS 77     Recent Labs     04/09/22  1105   LIPASE 16.0     No results for input(s): PROTIME, INR in the last 72 hours. Imaging  CT ABDOMEN W CONTRAST Additional Contrast? Radiologist Recommendation   Final Result   1. Disseminated hepatic metastatic disease. 2. Nonspecific distal esophageal wall thickening. Endoscopy recommended to   further evaluate. Neoplasm is a diagnostic consideration. RECOMMENDATIONS:   Unavailable         CT ABDOMEN PELVIS WO CONTRAST Additional Contrast? None   Final Result   1. Multiple hepatic lesions most consistent with metastatic disease. Findings concerning for an annular mass in the distal transverse colon,   likely a colonic primary with adjacent adenopathy. 2. No other acute findings within the abdomen or pelvis. Endoscopy  Colonoscopy 4/11/22 with Dr. Mini Wiggins  1. The colonoscope was advanced to approximately 70 cm until an obstructing circumferential malignant appearing mass was present. Multiple biopsies obtained from the lesion. A tattoo was placed distal with approximately 2 ccs of [place] ink. The remaining descending and sigmoid colon were normal appearing. ASSESSMENT AND RECOMMENDATIONS   So Mccracken is a 36 y.o. female with PMH of asthma who presented 4/9/22 with rectal bleeding. CT showed liver lesions and transverse colon mass. Underwent flex sig 4/11 revealing obstructing circumferential malignant appearing mass at 70 cm. Biopsies pending    1. Obstructing colon mass. Biopsies pending. CEA elevated  2. Liver lesions. Concern for metastatic disease  3. Iron deficiency anemia likely 2/2 #1  4.  Esophageal wall thickening likely related to esophagitis. RECOMMENDATIONS:    Follow up on biopsies  Will consult oncology  [id number] from GI standpoint for diet as tolerated    If you have any questions or need any further information, please feel free to contact us [phone]. Thank you for allowing us to participate in the care of Claudeen Cordoba. The note was completed using Dragon voice recognition transcription. Every effort was made to ensure accuracy; however, inadvertent transcription errors may be present despite my best efforts to edit errors. rGis VELIZ    Attending physician addendum:    I have personally seen and examined the patient, reviewed the patient's medical record and pertinent labs and clinical imaging. I have personally staffed the case with Gris VELIZ. I agree with her consultation note, exam findings, assessment and plans  as written above. I have made appropriate modifications and edited her assessment and plan where needed to reflect my impression and plans for this patient. Claudeen Cordoba is a 37 YO female with asthma, multiparous state, occasional alcohol use who presented with abdominal pain over the past 4-6 weeks, nausea, vomiting, dysuria, blood in the stool. CT scan concerning for metastatic colon cancer. Colonoscopy performed on 4/11 with obstructing colon mass at 70 cm. Biopsies pending. Oncology consulted. Thank you for allowing me to participate in this patient's care. If there are any questions or concerns regarding this patient, or the plan we have set in place, please feel free to contact me at [phone].      Rosas Person MD

## 2022-04-12 NOTE — PLAN OF CARE
Problem: Pain:  Goal: Pain level will decrease  Description: Pain level will decrease  4/12/2022 0908 by Sho Danielle RN  Outcome: Ongoing  4/12/2022 0459 by Abigail Payan RN  Outcome: Ongoing  Goal: Control of acute pain  Description: Control of acute pain  4/12/2022 0908 by Sho Danielle RN  Outcome: Ongoing  4/12/2022 0459 by Abigail Payan RN  Outcome: Ongoing  Goal: Control of chronic pain  Description: Control of chronic pain  4/12/2022 0908 by Sho Danielle RN  Outcome: Ongoing  4/12/2022 0459 by Abigail Payan RN  Outcome: Ongoing     Problem: Safety:  Goal: Free from accidental physical injury  Description: Free from accidental physical injury  4/12/2022 0908 by Sho Danielle RN  Outcome: Ongoing  4/12/2022 0459 by Abigail Payan RN  Outcome: Ongoing  Goal: Free from intentional harm  Description: Free from intentional harm  4/12/2022 0908 by Sho Danielle RN  Outcome: Ongoing  4/12/2022 0459 by Abigail Payan RN  Outcome: Ongoing     Problem: Daily Care:  Goal: Daily care needs are met  Description: Daily care needs are met  4/12/2022 0908 by Sho Danielle RN  Outcome: Ongoing  4/12/2022 0459 by Abigail Payan RN  Outcome: Ongoing     Problem: Discharge Planning:  Goal: Patients continuum of care needs are met  Description: Patients continuum of care needs are met  4/12/2022 0908 by Sho Danielle RN  Outcome: Ongoing  4/12/2022 0459 by Abigail Payan RN  Outcome: Ongoing     Problem: Nutrition  Goal: Optimal nutrition therapy  4/12/2022 0908 by Sho Danielle RN  Outcome: Ongoing  4/12/2022 0459 by Abigail Payan RN  Outcome: Ongoing

## 2022-04-12 NOTE — CONSULTS
Oncology [facility]ology [facility]   Consult Note    Admission Date/Time: 4/9/2022 10:48 AM  Today's Date: 04/12/22    Reason for Consult: Obstructing colon mass and CT confirmed liver lesions. Requesting Physician:  Dr. Micheal Thompson:  Rectal bleeding. History Obtained From: Patient    HISTORY OF PRESENT ILLNESS:  Janette Cai is a 36 y.o. with a PMH of asthma who presents to the ER with rectal bleeding and lower abdominal pain. She has had issues with constipation for years. About 2 months ago she had started with more pain and blood in her stools. She has been on and off oral Iron but did not tolerate. CT showed multiple liver lesions, most consistent with metastatic disease, largest 6 x 4 cm at the left hepatic dome and a transverse colon mass. She underwent a colonoscopy with Dr. Ella Tyson  that revealed an obstructing circumferential malignant appearing mass. Multiple biopsies obtained from the lesion. The remaining descending and sigmoid colon were normal appearing. Past Medical History:     has a past medical history of Asthma. Past Surgical History:    Past Surgical History:   Procedure Laterality Date    COLONOSCOPY N/A 4/11/2022    COLONOSCOPY WITH BIOPSY performed by Israel Alvarado MD at [address]  4/11/2022    COLONOSCOPY SUBMUCOSAL tattoo  INJECTION performed by Israel Alvarado MD at [facility] ENDOSCOPY      Current Medications:     pantoprazole  40 mg Oral QAM AC    sodium chloride flush  5-40 mL IntraVENous 2 times per day    metroNIDAZOLE  500 mg Oral 2 times per day    doxycycline hyclate  100 mg Oral 2 times per day     Allergies: Allergies   Allergen Reactions    Codeine Hives    Pcn [Penicillins] Hives      Social History:    reports that she has never smoked. She has never used smokeless tobacco. She reports current alcohol use. She reports that she does not use drugs. Family History:     Aunt - maternal- Colon cancer.       REVIEW OF SYSTEMS:    CONSTITUTIONAL: Negative for changes in weight, weakness, fatigue, or fevers  EYES:  Negative for  eye discharge, changes in vision, redness and icterus  HEENT:  Negative for  nasal congestion, sore mouth and sore throat  RESPIRATORY: Negative for cough, shortness of breath, or pleuritic pain  CARDIOVASCULAR:  Negative for  chest pain, palpitations, or heaviness  GASTROINTESTINAL:  Negative for changes in appetite, nausea, vomiting, dysphagia, constipation, or diarrhea  GENITOURINARY:  Negative for dysuria, hematuria, frequency, or hesitency  INTEGUMENT/BREAST:  Negative for rash, dryness, pruritis, or skin lesions   HEMATOLOGIC:  Negative for abnormal bleeding or bruising. LYMPHATIC: Negative for lymphadenopathy  ENDOCRINE:  Negative for excessive thirst/hunger/urination. MUSCULOSKELETAL:  Negative for muscle or joint pain, joint stiffness, or weakness  NEUROLOGICAL:  Negative for syncope, seizures, numbness/tingling, or tremors    PHYSICAL EXAM:    Vitals:  Vitals:    04/12/22 1342   BP: 136/78   Pulse: 71   Resp:    Temp: 99 °F (37.2 °C)   SpO2: 100%      CONSTITUTIONAL:  Awake, alert, cooperative, no apparent distress. EYES:  Pupils equal, round and reactive to light, sclera clear and conjunctiva normal.  ENT:  Normocepalic, without obvious abnormality, atraumatic. NECK:  Supple, symmetrical, no jugular venous distension, no thyromegaly. HEMATOLOGIC/LYMPHATICS:  No cervical,supraclavicular or axillary lymphadenopathy. LUNGS:  No increased work of breathing and clear to auscultation. CARDIOVASCULAR: Regular rate and rhythm, normal S1 and S2, no murmur noted. ABDOMEN:  Normal bowel sounds x 4, soft, non-distended, non-tender, no masses, ascites, or organomegaly noted. MUSCULOSKELETAL:  Full range of motion noted, tone is normal  EXTREMITIES: No lower extremity edema  NEUROLOGIC:  Awake, alert, oriented to name, place and time. Motor skills grossly intact.   SKIN:  Normal skin color, texture, turgor and no jaundice. Appears intact. DATA:  General Labs:    CBC:   Recent Labs     04/10/22  0713   WBC 9.6   HGB 10.9*   HCT 32.3*   MCV 95.7        BMP: No results for input(s): NA, K, CL, CO2, PHOS, BUN, CREATININE, CA in the last 72 hours. LIVER PROFILE: No results for input(s): AST, ALT, LIPASE, BILIDIR, BILITOT, ALKPHOS in the last 72 hours. Invalid input(s): AMYLASE,  ALB  PT/INR:    Lab Results   Component Value Date    PROTIME 11.5 01/05/2014    INR 1.03 01/05/2014     PTT:  No results found for: APTT  Magnesium:  No results found for: MG        Assessment & Plan:    1. Obstructing colon mass with concern for metastasis to liver. CT with multiple hepatic lesions most consistent with metastatic disease. Colonoscopy with obstructing annular mass in the transverse colon. Surgery consult placed. She will also need a port. Await pathology. Discussed liver biopsy with Dr. Shena Rivera, she will discuss with patient and order. I have discussed the above stated plan with the patient and they verbalized understanding and agreed with the plan. Thank you for allowing us to participate in this patient's care.     Carmelina Scott, ANIKA - CNP, CNP 4/12/2022, 5:38 PM

## 2022-04-12 NOTE — PROGRESS NOTES
Pt seen and examined alongside with my coworker Isabel Ventura NP  I have recommended a surgery consult despite liver mets  I do think we need to have a discussion of should we remove or palliate the primary tumor due to nearly obstructive symptoms /abd pain and then do chemo or do chemo first ??    She will need a port  SHe also needs a liver biopsy-ordered  We will need caris testing genetic testing etc

## 2022-04-13 ENCOUNTER — APPOINTMENT (OUTPATIENT)
Dept: GENERAL RADIOLOGY | Age: 42
DRG: 231 | End: 2022-04-13
Payer: COMMERCIAL

## 2022-04-13 ENCOUNTER — ANESTHESIA EVENT (OUTPATIENT)
Dept: OPERATING ROOM | Age: 42
DRG: 231 | End: 2022-04-13
Payer: COMMERCIAL

## 2022-04-13 ENCOUNTER — ANESTHESIA (OUTPATIENT)
Dept: OPERATING ROOM | Age: 42
DRG: 231 | End: 2022-04-13
Payer: COMMERCIAL

## 2022-04-13 VITALS
TEMPERATURE: 97 F | OXYGEN SATURATION: 94 % | RESPIRATION RATE: 12 BRPM | SYSTOLIC BLOOD PRESSURE: 118 MMHG | DIASTOLIC BLOOD PRESSURE: 57 MMHG

## 2022-04-13 LAB
APTT: 36.1 SEC (ref 26.2–38.6)
BASOPHILS ABSOLUTE: 0 K/UL (ref 0–0.2)
BASOPHILS RELATIVE PERCENT: 0.6 %
EOSINOPHILS ABSOLUTE: 0.4 K/UL (ref 0–0.6)
EOSINOPHILS RELATIVE PERCENT: 5.8 %
FIBRINOGEN: 660 MG/DL (ref 200–397)
HCG(URINE) PREGNANCY TEST: NEGATIVE
HCT VFR BLD CALC: 30.4 % (ref 36–48)
HEMOGLOBIN: 10.3 G/DL (ref 12–16)
INR BLD: 1.15 (ref 0.88–1.12)
LYMPHOCYTES ABSOLUTE: 1.3 K/UL (ref 1–5.1)
LYMPHOCYTES RELATIVE PERCENT: 20.6 %
MCH RBC QN AUTO: 32.2 PG (ref 26–34)
MCHC RBC AUTO-ENTMCNC: 33.9 G/DL (ref 31–36)
MCV RBC AUTO: 94.9 FL (ref 80–100)
MONOCYTES ABSOLUTE: 0.7 K/UL (ref 0–1.3)
MONOCYTES RELATIVE PERCENT: 11 %
NEUTROPHILS ABSOLUTE: 3.9 K/UL (ref 1.7–7.7)
NEUTROPHILS RELATIVE PERCENT: 62 %
PDW BLD-RTO: 13 % (ref 12.4–15.4)
PLATELET # BLD: 277 K/UL (ref 135–450)
PMV BLD AUTO: 8.2 FL (ref 5–10.5)
PROTHROMBIN TIME: 13.1 SEC (ref 9.9–12.7)
RBC # BLD: 3.2 M/UL (ref 4–5.2)
WBC # BLD: 6.2 K/UL (ref 4–11)

## 2022-04-13 PROCEDURE — 7100000000 HC PACU RECOVERY - FIRST 15 MIN: Performed by: SURGERY

## 2022-04-13 PROCEDURE — 47379 UNLISTED LAPS PX LIVER: CPT | Performed by: SURGERY

## 2022-04-13 PROCEDURE — 0FB04ZX EXCISION OF LIVER, PERCUTANEOUS ENDOSCOPIC APPROACH, DIAGNOSTIC: ICD-10-PCS | Performed by: SURGERY

## 2022-04-13 PROCEDURE — 6360000002 HC RX W HCPCS: Performed by: INTERNAL MEDICINE

## 2022-04-13 PROCEDURE — 2500000003 HC RX 250 WO HCPCS: Performed by: NURSE ANESTHETIST, CERTIFIED REGISTERED

## 2022-04-13 PROCEDURE — 3600000004 HC SURGERY LEVEL 4 BASE: Performed by: SURGERY

## 2022-04-13 PROCEDURE — 76937 US GUIDE VASCULAR ACCESS: CPT | Performed by: SURGERY

## 2022-04-13 PROCEDURE — 2580000003 HC RX 258: Performed by: ANESTHESIOLOGY

## 2022-04-13 PROCEDURE — 2580000003 HC RX 258: Performed by: SURGERY

## 2022-04-13 PROCEDURE — 77001 FLUOROGUIDE FOR VEIN DEVICE: CPT

## 2022-04-13 PROCEDURE — 71045 X-RAY EXAM CHEST 1 VIEW: CPT

## 2022-04-13 PROCEDURE — A4217 STERILE WATER/SALINE, 500 ML: HCPCS | Performed by: SURGERY

## 2022-04-13 PROCEDURE — 84703 CHORIONIC GONADOTROPIN ASSAY: CPT

## 2022-04-13 PROCEDURE — 6360000002 HC RX W HCPCS: Performed by: NURSE ANESTHETIST, CERTIFIED REGISTERED

## 2022-04-13 PROCEDURE — 2500000003 HC RX 250 WO HCPCS: Performed by: SURGERY

## 2022-04-13 PROCEDURE — 6370000000 HC RX 637 (ALT 250 FOR IP): Performed by: INTERNAL MEDICINE

## 2022-04-13 PROCEDURE — 0JH60WZ INSERTION OF TOTALLY IMPLANTABLE VASCULAR ACCESS DEVICE INTO CHEST SUBCUTANEOUS TISSUE AND FASCIA, OPEN APPROACH: ICD-10-PCS | Performed by: SURGERY

## 2022-04-13 PROCEDURE — 85730 THROMBOPLASTIN TIME PARTIAL: CPT

## 2022-04-13 PROCEDURE — 2709999900 HC NON-CHARGEABLE SUPPLY: Performed by: SURGERY

## 2022-04-13 PROCEDURE — 3600000014 HC SURGERY LEVEL 4 ADDTL 15MIN: Performed by: SURGERY

## 2022-04-13 PROCEDURE — 6360000002 HC RX W HCPCS: Performed by: SURGERY

## 2022-04-13 PROCEDURE — 1200000000 HC SEMI PRIVATE

## 2022-04-13 PROCEDURE — 88307 TISSUE EXAM BY PATHOLOGIST: CPT

## 2022-04-13 PROCEDURE — 88309 TISSUE EXAM BY PATHOLOGIST: CPT

## 2022-04-13 PROCEDURE — 6370000000 HC RX 637 (ALT 250 FOR IP): Performed by: SURGERY

## 2022-04-13 PROCEDURE — 3700000000 HC ANESTHESIA ATTENDED CARE: Performed by: SURGERY

## 2022-04-13 PROCEDURE — 88342 IMHCHEM/IMCYTCHM 1ST ANTB: CPT

## 2022-04-13 PROCEDURE — 36561 INSERT TUNNELED CV CATH: CPT | Performed by: SURGERY

## 2022-04-13 PROCEDURE — 0DTL4ZZ RESECTION OF TRANSVERSE COLON, PERCUTANEOUS ENDOSCOPIC APPROACH: ICD-10-PCS | Performed by: SURGERY

## 2022-04-13 PROCEDURE — C1788 PORT, INDWELLING, IMP: HCPCS | Performed by: SURGERY

## 2022-04-13 PROCEDURE — 6360000002 HC RX W HCPCS: Performed by: ANESTHESIOLOGY

## 2022-04-13 PROCEDURE — 3700000001 HC ADD 15 MINUTES (ANESTHESIA): Performed by: SURGERY

## 2022-04-13 PROCEDURE — 85025 COMPLETE CBC W/AUTO DIFF WBC: CPT

## 2022-04-13 PROCEDURE — 85384 FIBRINOGEN ACTIVITY: CPT

## 2022-04-13 PROCEDURE — 36415 COLL VENOUS BLD VENIPUNCTURE: CPT

## 2022-04-13 PROCEDURE — 02HV33Z INSERTION OF INFUSION DEVICE INTO SUPERIOR VENA CAVA, PERCUTANEOUS APPROACH: ICD-10-PCS | Performed by: SURGERY

## 2022-04-13 PROCEDURE — 7100000001 HC PACU RECOVERY - ADDTL 15 MIN: Performed by: SURGERY

## 2022-04-13 PROCEDURE — 44208 L COLECTOMY/COLOPROCTOSTOMY: CPT | Performed by: SURGERY

## 2022-04-13 PROCEDURE — 2720000010 HC SURG SUPPLY STERILE: Performed by: SURGERY

## 2022-04-13 PROCEDURE — 0D1L4Z4 BYPASS TRANSVERSE COLON TO CUTANEOUS, PERCUTANEOUS ENDOSCOPIC APPROACH: ICD-10-PCS | Performed by: SURGERY

## 2022-04-13 PROCEDURE — 2580000003 HC RX 258: Performed by: INTERNAL MEDICINE

## 2022-04-13 PROCEDURE — 88341 IMHCHEM/IMCYTCHM EA ADD ANTB: CPT

## 2022-04-13 PROCEDURE — 85610 PROTHROMBIN TIME: CPT

## 2022-04-13 PROCEDURE — 77001 FLUOROGUIDE FOR VEIN DEVICE: CPT | Performed by: SURGERY

## 2022-04-13 DEVICE — PORT INFUS SGL LUMN ATTCH POLYUR OPN END CATH 8FR POWERPRT: Type: IMPLANTABLE DEVICE | Site: CHEST | Status: FUNCTIONAL

## 2022-04-13 RX ORDER — FENTANYL CITRATE 50 UG/ML
50 INJECTION, SOLUTION INTRAMUSCULAR; INTRAVENOUS EVERY 5 MIN PRN
Status: DISCONTINUED | OUTPATIENT
Start: 2022-04-13 | End: 2022-04-13 | Stop reason: HOSPADM

## 2022-04-13 RX ORDER — OXYCODONE HYDROCHLORIDE 10 MG/1
10 TABLET ORAL EVERY 4 HOURS PRN
Status: DISCONTINUED | OUTPATIENT
Start: 2022-04-13 | End: 2022-04-19 | Stop reason: HOSPADM

## 2022-04-13 RX ORDER — PROPOFOL 10 MG/ML
INJECTION, EMULSION INTRAVENOUS PRN
Status: DISCONTINUED | OUTPATIENT
Start: 2022-04-13 | End: 2022-04-13 | Stop reason: SDUPTHER

## 2022-04-13 RX ORDER — MEPERIDINE HYDROCHLORIDE 25 MG/ML
12.5 INJECTION INTRAMUSCULAR; INTRAVENOUS; SUBCUTANEOUS EVERY 5 MIN PRN
Status: DISCONTINUED | OUTPATIENT
Start: 2022-04-13 | End: 2022-04-13 | Stop reason: HOSPADM

## 2022-04-13 RX ORDER — SODIUM CHLORIDE 0.9 % (FLUSH) 0.9 %
5-40 SYRINGE (ML) INJECTION EVERY 12 HOURS SCHEDULED
Status: DISCONTINUED | OUTPATIENT
Start: 2022-04-13 | End: 2022-04-13 | Stop reason: HOSPADM

## 2022-04-13 RX ORDER — SUCCINYLCHOLINE/SOD CL,ISO/PF 200MG/10ML
SYRINGE (ML) INTRAVENOUS PRN
Status: DISCONTINUED | OUTPATIENT
Start: 2022-04-13 | End: 2022-04-13 | Stop reason: SDUPTHER

## 2022-04-13 RX ORDER — LIDOCAINE HYDROCHLORIDE 20 MG/ML
INJECTION, SOLUTION EPIDURAL; INFILTRATION; INTRACAUDAL; PERINEURAL PRN
Status: DISCONTINUED | OUTPATIENT
Start: 2022-04-13 | End: 2022-04-13 | Stop reason: SDUPTHER

## 2022-04-13 RX ORDER — HEPARIN SODIUM (PORCINE) LOCK FLUSH IV SOLN 100 UNIT/ML 100 UNIT/ML
SOLUTION INTRAVENOUS
Status: COMPLETED | OUTPATIENT
Start: 2022-04-13 | End: 2022-04-13

## 2022-04-13 RX ORDER — CIPROFLOXACIN 2 MG/ML
400 INJECTION, SOLUTION INTRAVENOUS EVERY 12 HOURS
Status: COMPLETED | OUTPATIENT
Start: 2022-04-13 | End: 2022-04-14

## 2022-04-13 RX ORDER — OXYCODONE HYDROCHLORIDE 5 MG/1
5 TABLET ORAL EVERY 4 HOURS PRN
Status: DISCONTINUED | OUTPATIENT
Start: 2022-04-13 | End: 2022-04-19 | Stop reason: HOSPADM

## 2022-04-13 RX ORDER — OXYCODONE HYDROCHLORIDE 10 MG/1
10 TABLET ORAL PRN
Status: DISCONTINUED | OUTPATIENT
Start: 2022-04-13 | End: 2022-04-13 | Stop reason: HOSPADM

## 2022-04-13 RX ORDER — ONDANSETRON 2 MG/ML
INJECTION INTRAMUSCULAR; INTRAVENOUS PRN
Status: DISCONTINUED | OUTPATIENT
Start: 2022-04-13 | End: 2022-04-13 | Stop reason: SDUPTHER

## 2022-04-13 RX ORDER — KETOROLAC TROMETHAMINE 30 MG/ML
30 INJECTION, SOLUTION INTRAMUSCULAR; INTRAVENOUS EVERY 6 HOURS
Status: COMPLETED | OUTPATIENT
Start: 2022-04-13 | End: 2022-04-15

## 2022-04-13 RX ORDER — SODIUM CHLORIDE 0.9 % (FLUSH) 0.9 %
5-40 SYRINGE (ML) INJECTION PRN
Status: DISCONTINUED | OUTPATIENT
Start: 2022-04-13 | End: 2022-04-13 | Stop reason: HOSPADM

## 2022-04-13 RX ORDER — ACETAMINOPHEN 500 MG
1000 TABLET ORAL EVERY 8 HOURS
Status: DISCONTINUED | OUTPATIENT
Start: 2022-04-13 | End: 2022-04-19 | Stop reason: HOSPADM

## 2022-04-13 RX ORDER — ONDANSETRON 2 MG/ML
4 INJECTION INTRAMUSCULAR; INTRAVENOUS
Status: COMPLETED | OUTPATIENT
Start: 2022-04-13 | End: 2022-04-13

## 2022-04-13 RX ORDER — DEXAMETHASONE SODIUM PHOSPHATE 4 MG/ML
INJECTION, SOLUTION INTRA-ARTICULAR; INTRALESIONAL; INTRAMUSCULAR; INTRAVENOUS; SOFT TISSUE PRN
Status: DISCONTINUED | OUTPATIENT
Start: 2022-04-13 | End: 2022-04-13 | Stop reason: SDUPTHER

## 2022-04-13 RX ORDER — SODIUM CHLORIDE, SODIUM LACTATE, POTASSIUM CHLORIDE, CALCIUM CHLORIDE 600; 310; 30; 20 MG/100ML; MG/100ML; MG/100ML; MG/100ML
INJECTION, SOLUTION INTRAVENOUS CONTINUOUS
Status: DISCONTINUED | OUTPATIENT
Start: 2022-04-13 | End: 2022-04-19

## 2022-04-13 RX ORDER — MIDAZOLAM HYDROCHLORIDE 1 MG/ML
INJECTION INTRAMUSCULAR; INTRAVENOUS PRN
Status: DISCONTINUED | OUTPATIENT
Start: 2022-04-13 | End: 2022-04-13 | Stop reason: SDUPTHER

## 2022-04-13 RX ORDER — BUPIVACAINE HYDROCHLORIDE 5 MG/ML
INJECTION, SOLUTION EPIDURAL; INTRACAUDAL
Status: COMPLETED | OUTPATIENT
Start: 2022-04-13 | End: 2022-04-13

## 2022-04-13 RX ORDER — FENTANYL CITRATE 50 UG/ML
25 INJECTION, SOLUTION INTRAMUSCULAR; INTRAVENOUS EVERY 5 MIN PRN
Status: DISCONTINUED | OUTPATIENT
Start: 2022-04-13 | End: 2022-04-13 | Stop reason: HOSPADM

## 2022-04-13 RX ORDER — ROCURONIUM BROMIDE 10 MG/ML
INJECTION, SOLUTION INTRAVENOUS PRN
Status: DISCONTINUED | OUTPATIENT
Start: 2022-04-13 | End: 2022-04-13 | Stop reason: SDUPTHER

## 2022-04-13 RX ORDER — FENTANYL CITRATE 50 UG/ML
INJECTION, SOLUTION INTRAMUSCULAR; INTRAVENOUS PRN
Status: DISCONTINUED | OUTPATIENT
Start: 2022-04-13 | End: 2022-04-13 | Stop reason: SDUPTHER

## 2022-04-13 RX ORDER — DOCUSATE SODIUM 100 MG/1
100 CAPSULE, LIQUID FILLED ORAL 2 TIMES DAILY
Status: DISCONTINUED | OUTPATIENT
Start: 2022-04-13 | End: 2022-04-19 | Stop reason: HOSPADM

## 2022-04-13 RX ORDER — OXYCODONE HYDROCHLORIDE 5 MG/1
5 TABLET ORAL PRN
Status: DISCONTINUED | OUTPATIENT
Start: 2022-04-13 | End: 2022-04-13 | Stop reason: HOSPADM

## 2022-04-13 RX ORDER — SODIUM CHLORIDE 9 MG/ML
INJECTION, SOLUTION INTRAVENOUS PRN
Status: DISCONTINUED | OUTPATIENT
Start: 2022-04-13 | End: 2022-04-13 | Stop reason: HOSPADM

## 2022-04-13 RX ADMIN — ONDANSETRON 4 MG: 2 INJECTION INTRAMUSCULAR; INTRAVENOUS at 16:54

## 2022-04-13 RX ADMIN — FENTANYL CITRATE 50 MCG: 50 INJECTION, SOLUTION INTRAMUSCULAR; INTRAVENOUS at 17:37

## 2022-04-13 RX ADMIN — ROCURONIUM BROMIDE 20 MG: 10 INJECTION INTRAVENOUS at 14:27

## 2022-04-13 RX ADMIN — Medication 6.25 MG: at 17:51

## 2022-04-13 RX ADMIN — CIPROFLOXACIN 400 MG: 2 INJECTION, SOLUTION INTRAVENOUS at 21:15

## 2022-04-13 RX ADMIN — ONDANSETRON 4 MG: 2 INJECTION INTRAMUSCULAR; INTRAVENOUS at 09:22

## 2022-04-13 RX ADMIN — FENTANYL CITRATE 25 MCG: 50 INJECTION, SOLUTION INTRAMUSCULAR; INTRAVENOUS at 17:52

## 2022-04-13 RX ADMIN — SODIUM CHLORIDE: 9 INJECTION, SOLUTION INTRAVENOUS at 16:29

## 2022-04-13 RX ADMIN — CEFAZOLIN 2000 MG: 10 INJECTION, POWDER, FOR SOLUTION INTRAVENOUS at 12:55

## 2022-04-13 RX ADMIN — PROPOFOL 200 MG: 10 INJECTION, EMULSION INTRAVENOUS at 13:02

## 2022-04-13 RX ADMIN — ROCURONIUM BROMIDE 10 MG: 10 INJECTION INTRAVENOUS at 15:18

## 2022-04-13 RX ADMIN — DOXYCYCLINE HYCLATE 100 MG: 100 TABLET, FILM COATED ORAL at 09:22

## 2022-04-13 RX ADMIN — ONDANSETRON 4 MG: 2 INJECTION INTRAMUSCULAR; INTRAVENOUS at 17:13

## 2022-04-13 RX ADMIN — MIDAZOLAM 2 MG: 1 INJECTION INTRAMUSCULAR; INTRAVENOUS at 12:55

## 2022-04-13 RX ADMIN — DOXYCYCLINE HYCLATE 100 MG: 100 TABLET, FILM COATED ORAL at 21:09

## 2022-04-13 RX ADMIN — Medication 140 MG: at 13:02

## 2022-04-13 RX ADMIN — CIPROFLOXACIN 400 MG: 2 INJECTION, SOLUTION INTRAVENOUS at 09:28

## 2022-04-13 RX ADMIN — SODIUM CHLORIDE: 9 INJECTION, SOLUTION INTRAVENOUS at 12:55

## 2022-04-13 RX ADMIN — SODIUM CHLORIDE, PRESERVATIVE FREE 10 ML: 5 INJECTION INTRAVENOUS at 21:10

## 2022-04-13 RX ADMIN — DOCUSATE SODIUM 100 MG: 100 CAPSULE, LIQUID FILLED ORAL at 21:09

## 2022-04-13 RX ADMIN — HYDROMORPHONE HYDROCHLORIDE 1 MG: 1 INJECTION, SOLUTION INTRAMUSCULAR; INTRAVENOUS; SUBCUTANEOUS at 15:42

## 2022-04-13 RX ADMIN — ACETAMINOPHEN 650 MG: 325 TABLET ORAL at 09:37

## 2022-04-13 RX ADMIN — SUGAMMADEX 400 MG: 100 INJECTION, SOLUTION INTRAVENOUS at 16:55

## 2022-04-13 RX ADMIN — KETOROLAC TROMETHAMINE 30 MG: 30 INJECTION, SOLUTION INTRAMUSCULAR at 21:09

## 2022-04-13 RX ADMIN — METRONIDAZOLE 500 MG: 500 TABLET ORAL at 09:21

## 2022-04-13 RX ADMIN — PANTOPRAZOLE SODIUM 40 MG: 40 TABLET, DELAYED RELEASE ORAL at 06:15

## 2022-04-13 RX ADMIN — DEXAMETHASONE SODIUM PHOSPHATE 8 MG: 4 INJECTION, SOLUTION INTRAMUSCULAR; INTRAVENOUS at 14:19

## 2022-04-13 RX ADMIN — LIDOCAINE HYDROCHLORIDE 100 MG: 20 INJECTION, SOLUTION EPIDURAL; INFILTRATION; INTRACAUDAL; PERINEURAL at 13:02

## 2022-04-13 RX ADMIN — SODIUM CHLORIDE, POTASSIUM CHLORIDE, SODIUM LACTATE AND CALCIUM CHLORIDE: 600; 310; 30; 20 INJECTION, SOLUTION INTRAVENOUS at 19:50

## 2022-04-13 RX ADMIN — SODIUM CHLORIDE, POTASSIUM CHLORIDE, SODIUM LACTATE AND CALCIUM CHLORIDE: 600; 310; 30; 20 INJECTION, SOLUTION INTRAVENOUS at 11:43

## 2022-04-13 RX ADMIN — ROCURONIUM BROMIDE 50 MG: 10 INJECTION INTRAVENOUS at 13:14

## 2022-04-13 RX ADMIN — HYDROMORPHONE HYDROCHLORIDE 1 MG: 1 INJECTION, SOLUTION INTRAMUSCULAR; INTRAVENOUS; SUBCUTANEOUS at 14:01

## 2022-04-13 RX ADMIN — ACETAMINOPHEN 1000 MG: 325 TABLET ORAL at 21:09

## 2022-04-13 RX ADMIN — FENTANYL CITRATE 100 MCG: 50 INJECTION INTRAMUSCULAR; INTRAVENOUS at 12:58

## 2022-04-13 RX ADMIN — SODIUM CHLORIDE, PRESERVATIVE FREE 10 ML: 5 INJECTION INTRAVENOUS at 09:27

## 2022-04-13 ASSESSMENT — PULMONARY FUNCTION TESTS
PIF_VALUE: 25
PIF_VALUE: 18
PIF_VALUE: 18
PIF_VALUE: 20
PIF_VALUE: 19
PIF_VALUE: 19
PIF_VALUE: 18
PIF_VALUE: 24
PIF_VALUE: 21
PIF_VALUE: 24
PIF_VALUE: 15
PIF_VALUE: 24
PIF_VALUE: 1
PIF_VALUE: 19
PIF_VALUE: 18
PIF_VALUE: 18
PIF_VALUE: 19
PIF_VALUE: 18
PIF_VALUE: 24
PIF_VALUE: 6
PIF_VALUE: 18
PIF_VALUE: 19
PIF_VALUE: 18
PIF_VALUE: 1
PIF_VALUE: 24
PIF_VALUE: 24
PIF_VALUE: 1
PIF_VALUE: 19
PIF_VALUE: 18
PIF_VALUE: 19
PIF_VALUE: 21
PIF_VALUE: 19
PIF_VALUE: 18
PIF_VALUE: 24
PIF_VALUE: 0
PIF_VALUE: 24
PIF_VALUE: 21
PIF_VALUE: 20
PIF_VALUE: 18
PIF_VALUE: 24
PIF_VALUE: 18
PIF_VALUE: 19
PIF_VALUE: 24
PIF_VALUE: 19
PIF_VALUE: 24
PIF_VALUE: 19
PIF_VALUE: 18
PIF_VALUE: 24
PIF_VALUE: 19
PIF_VALUE: 18
PIF_VALUE: 24
PIF_VALUE: 25
PIF_VALUE: 23
PIF_VALUE: 18
PIF_VALUE: 24
PIF_VALUE: 1
PIF_VALUE: 23
PIF_VALUE: 22
PIF_VALUE: 24
PIF_VALUE: 24
PIF_VALUE: 23
PIF_VALUE: 19
PIF_VALUE: 24
PIF_VALUE: 24
PIF_VALUE: 19
PIF_VALUE: 19
PIF_VALUE: 18
PIF_VALUE: 24
PIF_VALUE: 2
PIF_VALUE: 18
PIF_VALUE: 24
PIF_VALUE: 22
PIF_VALUE: 24
PIF_VALUE: 21
PIF_VALUE: 19
PIF_VALUE: 24
PIF_VALUE: 19
PIF_VALUE: 24
PIF_VALUE: 24
PIF_VALUE: 18
PIF_VALUE: 22
PIF_VALUE: 18
PIF_VALUE: 19
PIF_VALUE: 24
PIF_VALUE: 18
PIF_VALUE: 19
PIF_VALUE: 11
PIF_VALUE: 19
PIF_VALUE: 24
PIF_VALUE: 18
PIF_VALUE: 24
PIF_VALUE: 25
PIF_VALUE: 24
PIF_VALUE: 18
PIF_VALUE: 4
PIF_VALUE: 18
PIF_VALUE: 19
PIF_VALUE: 19
PIF_VALUE: 18
PIF_VALUE: 18
PIF_VALUE: 21
PIF_VALUE: 17
PIF_VALUE: 19
PIF_VALUE: 2
PIF_VALUE: 19
PIF_VALUE: 24
PIF_VALUE: 19
PIF_VALUE: 19
PIF_VALUE: 18
PIF_VALUE: 24
PIF_VALUE: 20
PIF_VALUE: 24
PIF_VALUE: 19
PIF_VALUE: 19
PIF_VALUE: 24
PIF_VALUE: 18
PIF_VALUE: 19
PIF_VALUE: 18
PIF_VALUE: 19
PIF_VALUE: 17
PIF_VALUE: 19
PIF_VALUE: 19
PIF_VALUE: 18
PIF_VALUE: 19
PIF_VALUE: 18
PIF_VALUE: 1
PIF_VALUE: 24
PIF_VALUE: 19
PIF_VALUE: 24
PIF_VALUE: 19
PIF_VALUE: 19
PIF_VALUE: 18
PIF_VALUE: 24
PIF_VALUE: 24
PIF_VALUE: 19
PIF_VALUE: 19
PIF_VALUE: 18
PIF_VALUE: 19
PIF_VALUE: 18
PIF_VALUE: 24
PIF_VALUE: 18
PIF_VALUE: 24
PIF_VALUE: 24
PIF_VALUE: 19
PIF_VALUE: 19
PIF_VALUE: 18
PIF_VALUE: 2
PIF_VALUE: 19
PIF_VALUE: 18
PIF_VALUE: 20
PIF_VALUE: 10
PIF_VALUE: 20
PIF_VALUE: 18
PIF_VALUE: 19
PIF_VALUE: 19
PIF_VALUE: 18
PIF_VALUE: 22
PIF_VALUE: 24
PIF_VALUE: 24
PIF_VALUE: 18
PIF_VALUE: 24
PIF_VALUE: 19
PIF_VALUE: 23
PIF_VALUE: 18
PIF_VALUE: 22
PIF_VALUE: 23
PIF_VALUE: 24
PIF_VALUE: 4
PIF_VALUE: 18
PIF_VALUE: 23
PIF_VALUE: 18
PIF_VALUE: 19
PIF_VALUE: 24
PIF_VALUE: 1
PIF_VALUE: 19
PIF_VALUE: 14
PIF_VALUE: 19
PIF_VALUE: 1
PIF_VALUE: 19
PIF_VALUE: 24
PIF_VALUE: 24
PIF_VALUE: 19
PIF_VALUE: 19
PIF_VALUE: 24
PIF_VALUE: 19
PIF_VALUE: 24
PIF_VALUE: 19
PIF_VALUE: 2
PIF_VALUE: 24
PIF_VALUE: 19
PIF_VALUE: 19
PIF_VALUE: 20
PIF_VALUE: 18
PIF_VALUE: 24
PIF_VALUE: 19
PIF_VALUE: 18
PIF_VALUE: 24
PIF_VALUE: 18
PIF_VALUE: 2
PIF_VALUE: 18
PIF_VALUE: 19
PIF_VALUE: 21
PIF_VALUE: 20
PIF_VALUE: 24
PIF_VALUE: 19
PIF_VALUE: 24
PIF_VALUE: 20
PIF_VALUE: 18
PIF_VALUE: 21
PIF_VALUE: 18
PIF_VALUE: 19
PIF_VALUE: 24
PIF_VALUE: 19
PIF_VALUE: 19
PIF_VALUE: 24
PIF_VALUE: 20
PIF_VALUE: 18
PIF_VALUE: 19
PIF_VALUE: 20
PIF_VALUE: 20
PIF_VALUE: 18
PIF_VALUE: 18
PIF_VALUE: 24
PIF_VALUE: 19
PIF_VALUE: 39

## 2022-04-13 ASSESSMENT — PAIN SCALES - WONG BAKER
WONGBAKER_NUMERICALRESPONSE: 0
WONGBAKER_NUMERICALRESPONSE: 0
WONGBAKER_NUMERICALRESPONSE: 2
WONGBAKER_NUMERICALRESPONSE: 0

## 2022-04-13 ASSESSMENT — ENCOUNTER SYMPTOMS: SHORTNESS OF BREATH: 0

## 2022-04-13 ASSESSMENT — PAIN SCALES - GENERAL
PAINLEVEL_OUTOF10: 10
PAINLEVEL_OUTOF10: 3
PAINLEVEL_OUTOF10: 6
PAINLEVEL_OUTOF10: 3
PAINLEVEL_OUTOF10: 4
PAINLEVEL_OUTOF10: 7
PAINLEVEL_OUTOF10: 0

## 2022-04-13 ASSESSMENT — PAIN DESCRIPTION - DESCRIPTORS
DESCRIPTORS: PATIENT UNABLE TO DESCRIBE
DESCRIPTORS: ACHING;DISCOMFORT;CONSTANT;SHARP
DESCRIPTORS: OTHER (COMMENT)
DESCRIPTORS: PATIENT UNABLE TO DESCRIBE
DESCRIPTORS: PATIENT UNABLE TO DESCRIBE

## 2022-04-13 ASSESSMENT — PAIN DESCRIPTION - LOCATION
LOCATION: ABDOMEN

## 2022-04-13 ASSESSMENT — LIFESTYLE VARIABLES: SMOKING_STATUS: 0

## 2022-04-13 ASSESSMENT — PAIN DESCRIPTION - PAIN TYPE
TYPE: SURGICAL PAIN

## 2022-04-13 ASSESSMENT — PAIN - FUNCTIONAL ASSESSMENT
PAIN_FUNCTIONAL_ASSESSMENT: 0-10
PAIN_FUNCTIONAL_ASSESSMENT: PREVENTS OR INTERFERES SOME ACTIVE ACTIVITIES AND ADLS

## 2022-04-13 ASSESSMENT — PAIN DESCRIPTION - FREQUENCY: FREQUENCY: CONTINUOUS

## 2022-04-13 NOTE — BRIEF OP NOTE
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    Post-Op Diagnosis: Same       Procedure(s):  LAPAROSCOPIC TRANSVERSE COLECTOMY WITH COLOSTOMY, INSERTION OF PORT A CATHETER, LAPAROSCOPIC LIVER BIOPSY    Surgeon(s):  Dariana Marquez MD    Assistant:  First Assistant: Jessica Jack    Anesthesia: General    Estimated Blood Loss (mL): 75 ml    Complications: None    Specimens:   ID Type Source Tests Collected by Time Destination   A : liver biopsy Tissue Tissue SURGICAL PATHOLOGY Dariana Marquez MD 4/13/2022 1541    B : Transverse colon Tissue Tissue SURGICAL PATHOLOGY Dariana Marquez MD 4/13/2022 1636        Implants:  Implant Name Type Inv.  Item Serial No.  Lot No. LRB No. Used Action   PORT INFUS SGL LUMN ATTCH POLYUR OPN END CATH 8FR POWERPRT - [id number]  PORT INFUS SGL LUMN ATTCH POLYUR OPN END CATH 8FR POWERPRT  [employer] [id number] Left 1 Implanted         Drains:   Colostomy LUQ Transverse (Active)       Urethral Catheter Non-latex 16 fr (Active)   $ Urethral catheter insertion Inserted for procedure 04/13/22 1319   Catheter Indications Perioperative use for selected surgical procedures 04/13/22 1319   Urine Color Shreya 04/13/22 1319   Urine Appearance Clear 04/13/22 1319       Findings: distal transverse colon cancer, multiple nodules of the liver consistent with metastatic disease    Electronically signed by Dariana Marquez MD on 4/13/2022 at 4:54 PM

## 2022-04-13 NOTE — PROGRESS NOTES
Consent signed for Lap. Transverse Colectomy with colostomy, possible open, with insertion of portacth by Dr. Felix Mcguire.

## 2022-04-13 NOTE — PLAN OF CARE
Problem: Pain:  Goal: Pain level will decrease  Description: Pain level will decrease  4/13/2022 1153 by Troy Sunshine RN  Outcome: Ongoing  4/13/2022 0200 by Randell Zamarripa RN  Outcome: Ongoing  Goal: Control of acute pain  Description: Control of acute pain  4/13/2022 1153 by Troy Sunshine RN  Outcome: Ongoing  4/13/2022 0200 by Randell Zamarripa RN  Outcome: Ongoing  Goal: Control of chronic pain  Description: Control of chronic pain  4/13/2022 1153 by Troy Sunshine RN  Outcome: Ongoing  4/13/2022 0200 by Randell Zamarripa RN  Outcome: Ongoing     Problem: Safety:  Goal: Free from accidental physical injury  Description: Free from accidental physical injury  4/13/2022 1153 by Troy Sunshine RN  Outcome: Ongoing  4/13/2022 0200 by Randell Zamarripa RN  Outcome: Ongoing  Goal: Free from intentional harm  Description: Free from intentional harm  4/13/2022 1153 by Troy Sunshine RN  Outcome: Ongoing  4/13/2022 0200 by Randell Zamarripa RN  Outcome: Ongoing     Problem: Daily Care:  Goal: Daily care needs are met  Description: Daily care needs are met  4/13/2022 1153 by Troy Sunshine RN  Outcome: Ongoing  4/13/2022 0200 by Randell Zamarripa RN  Outcome: Ongoing     Problem: Discharge Planning:  Goal: Patients continuum of care needs are met  Description: Patients continuum of care needs are met  4/13/2022 1153 by Troy Sunshine RN  Outcome: Ongoing  4/13/2022 0200 by Randell Zamarripa RN  Outcome: Ongoing     Problem: Nutrition  Goal: Optimal nutrition therapy  4/13/2022 1153 by Troy Sunshine RN  Outcome: Ongoing  4/13/2022 0200 by Randell Zamarripa RN  Outcome: Ongoing

## 2022-04-13 NOTE — ANESTHESIA PRE PROCEDURE
Department of Anesthesiology  Preprocedure Note       Name:  oS Mccracken   Age:  36 y.o.  :  1980                                          MRN:  [id number]         Date:  2022      Surgeon: Jah Farris):  Bailey Ross MD    Procedure: Procedure(s):  LAPAROSCOPIC TRANSVERSE COLECTOMY WITH COLOSTOMY, INSERTION OF PORT A CATHETER, LAPAROSCOPIC LIVER BIOPSY    Medications prior to admission:   Prior to Admission medications    Not on File       Current medications:    No current facility-administered medications for this visit. No current outpatient medications on file.      Facility-Administered Medications Ordered in Other Visits   Medication Dose Route Frequency Provider Last Rate Last Admin    ciprofloxacin (CIPRO) IVPB 400 mg  400 mg IntraVENous Q12H Bailey Ross MD        ceFAZolin (ANCEF) 2000 mg in dextrose 5 % 100 mL IVPB  2,000 mg IntraVENous On Call to [address] MD        lactated ringers infusion   IntraVENous Continuous Bailey Ross  mL/hr at 22 1143 New Bag at 22 1143    pantoprazole (PROTONIX) tablet 40 mg  40 mg Oral [facility] Juliet Avilez MD   40 mg at 22 0615    promethazine (PHENERGAN) 12.5mg in sodium chloride 0.9% 50 mL IVPB 12.5 mg  12.5 mg IntraVENous Q6H PRN Maribel Ceron MD   Stopped at 22 1142    sodium chloride flush 0.9 % injection 5-40 mL  5-40 mL IntraVENous 2 times per day Maribel Ceron MD   10 mL at 22 0927    sodium chloride flush 0.9 % injection 5-40 mL  5-40 mL IntraVENous PRN Maribel Ceron MD        0.9 % sodium chloride infusion   IntraVENous PRN Maribel Ceron MD        ondansetron (ZOFRAN-ODT) disintegrating tablet 4 mg  4 mg Oral Q8H PRN Maribel Ceron MD        Or    ondansetron [facility]) injection 4 mg  4 mg IntraVENous Q6H PRN Maribel Ceron MD   4 mg at 22 8184    acetaminophen (TYLENOL) tablet 650 mg  650 mg Oral Q6H PRN Sergio Gomez MD   650 mg at 04/13/22 0983    Or    acetaminophen (TYLENOL) suppository 650 mg  650 mg Rectal Q6H PRN Sergio Gomez MD        metroNIDAZOLE (FLAGYL) tablet 500 mg  500 mg Oral 2 times per day Sergio Gomez MD   500 mg at 04/13/22 2299    dicyclomine (BENTYL) capsule 20 mg  20 mg Oral Q6H PRN Sergio Gomez MD   20 mg at 04/12/22 0546    doxycycline hyclate (VIBRA-TABS) tablet 100 mg  100 mg Oral 2 times per day Sergio Gomez MD   100 mg at 04/13/22 4560       Allergies: Allergies   Allergen Reactions    Codeine Hives    Pcn [Penicillins] Hives       Problem List:    Patient Active Problem List   Diagnosis Code    Lower GI bleed K92.2    Trichimoniasis A59.9    Liver lesion K76.9    Colonic mass K63.89    Rectal bleeding K62.5       Past Medical History:        Diagnosis Date    Asthma        Past Surgical History:        Procedure Laterality Date    COLONOSCOPY N/A 4/11/2022    COLONOSCOPY WITH BIOPSY performed by Sergio Gomez MD at [address]  4/11/2022    COLONOSCOPY SUBMUCOSAL tattoo  INJECTION performed by Sergio Gomez MD at [address] History:    Social History     Tobacco Use    Smoking status: Never Smoker    Smokeless tobacco: Never Used   Substance Use Topics    Alcohol use: Yes     Comment: socially                                Counseling given: Not Answered      Vital Signs (Current): There were no vitals filed for this visit.                                            BP Readings from Last 3 Encounters:   04/13/22 139/62   04/11/22 (!) 142/74   12/23/19 117/71       NPO Status:                                                                                 BMI:   Wt Readings from Last 3 Encounters:   04/11/22 234 lb 5.6 oz (106.3 kg)   12/23/19 217 lb 13 oz (98.8 kg)   06/23/19 217 lb 9.5 oz (98.7 kg)     There is no height or weight on file to calculate BMI.    CBC:   Lab Results   Component Value Date    WBC 6.2 04/13/2022    RBC 3.20 04/13/2022    HGB 10.3 04/13/2022    HCT 30.4 04/13/2022    MCV 94.9 04/13/2022    RDW 13.0 04/13/2022     04/13/2022       CMP:   Lab Results   Component Value Date     04/09/2022    K 4.0 04/09/2022     04/09/2022    CO2 20 04/09/2022    BUN 12 04/09/2022    CREATININE 0.9 04/09/2022    GFRAA >60 04/09/2022    GFRAA >60 06/06/2013    AGRATIO 1.3 04/09/2022    LABGLOM >60 04/09/2022    GLUCOSE 98 04/09/2022    PROT 7.0 04/09/2022    CALCIUM 9.9 04/09/2022    BILITOT 0.6 04/09/2022    ALKPHOS 77 04/09/2022    AST 53 04/09/2022    ALT 25 04/09/2022       POC Tests: No results for input(s): POCGLU, POCNA, POCK, POCCL, POCBUN, POCHEMO, POCHCT in the last 72 hours. Coags:   Lab Results   Component Value Date    PROTIME 13.1 04/13/2022    INR 1.15 04/13/2022    APTT 36.1 04/13/2022       HCG (If Applicable):   Lab Results   Component Value Date    PREGTESTUR Negative 04/13/2022        ABGs: No results found for: PHART, PO2ART, YQR1UED, PRE7LTH, BEART, B1YVXKYY     Type & Screen (If Applicable):  Lab Results   Component Value Date    LABABO O 06/06/2013    LABRH Positive 06/06/2013       Drug/Infectious Status (If Applicable):  No results found for: HIV, HEPCAB    COVID-19 Screening (If Applicable):   Lab Results   Component Value Date    COVID19 Not Detected 04/09/2022           Anesthesia Evaluation  Patient summary reviewed no history of anesthetic complications:   Airway: Mallampati: III  TM distance: >3 FB   Neck ROM: full  Mouth opening: > = 3 FB Dental: normal exam         Pulmonary:   (+) asthma:     (-) pneumonia, COPD, shortness of breath and not a current smoker          Patient did not smoke on day of surgery.                  Cardiovascular:Negative CV ROS        (-) pacemaker, past MI, CABG/stent and  angina      Rhythm: regular             Beta Blocker:  Not on Beta Blocker         Neuro/Psych: Negative Neuro/Psych ROS     (-) seizures and CVA           GI/Hepatic/Renal:        (-) liver disease and no renal disease      ROS comment: GI bleeding, colonic mass. 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 (-) blood dyscrasia, arthritis               Abdominal:   (+) obese,           Vascular: negative vascular ROS. Other Findings:             Anesthesia Plan      general     ASA 3       Induction: intravenous. MIPS: Postoperative opioids intended and Prophylactic antiemetics administered. Anesthetic plan and risks discussed with patient. Plan discussed with CRNA. This pre-anesthesia assessment may be used as a history and physical.    DOS STAFF ADDENDUM:    Pt seen and examined, chart reviewed (including anesthesia, drug and allergy history). No interval changes to history and physical examination. Anesthetic plan, risks, benefits, alternatives, and personnel involved discussed with patient. Patient verbalized an understanding and agrees to proceed.       Angel Santo MD  April 13, 2022  11:55 AM

## 2022-04-13 NOTE — PROGRESS NOTES
Transferring patient back to room. VSS. Sleepy, but arouses to voice and follows commands. Denies nausea, minimal pain.

## 2022-04-13 NOTE — PROGRESS NOTES
II-XII intact, grossly non-focal.  Mental status: Alert, oriented, thought content appropriate. Capillary Refill: Acceptable  < 3 seconds  Peripheral Pulses: +3 Easily felt, not easily obliterated with pressure       Labs:   Recent Labs     04/13/22 0714   WBC 6.2   HGB 10.3*   HCT 30.4*        No results for input(s): NA, K, CL, CO2, BUN, CREATININE, CALCIUM, PHOS in the last 72 hours. Invalid input(s): MAGNES  No results for input(s): AST, ALT, BILIDIR, BILITOT, ALKPHOS in the last 72 hours. Recent Labs     04/13/22 0714   INR 1.15*     No results for input(s): Shellia Bugler in the last 72 hours. Urinalysis:      Lab Results   Component Value Date    NITRU Negative 04/09/2022    WBCUA 21-50 04/09/2022    BACTERIA 2+ 04/09/2022    RBCUA 3-4 04/09/2022    BLOODU SMALL 04/09/2022    SPECGRAV 1.020 04/09/2022    GLUCOSEU Negative 04/09/2022       Radiology:  [facility] VASCULAR ACCESS GUIDANCE   Final Result      CT ABDOMEN W CONTRAST Additional Contrast? Radiologist Recommendation   Final Result   1. Disseminated hepatic metastatic disease. 2. Nonspecific distal esophageal wall thickening. Endoscopy recommended to   further evaluate. Neoplasm is a diagnostic consideration. RECOMMENDATIONS:   Unavailable         CT ABDOMEN PELVIS WO CONTRAST Additional Contrast? None   Final Result   1. Multiple hepatic lesions most consistent with metastatic disease. Findings concerning for an annular mass in the distal transverse colon,   likely a colonic primary with adjacent adenopathy. 2. No other acute findings within the abdomen or pelvis.          XR CHEST PORTABLE    (Results Pending)           Assessment/Plan:    Active Hospital Problems    Diagnosis     Rectal bleeding [K62.5]     Lower GI bleed [K92.2]     Trichimoniasis [A59.9]     Liver lesion [K76.9]     Colonic mass [K63.89]               Generalized abdominal pain: Onset 4 to 6 weeks ago  CT abdomen and pelvis indicating liver lesions and transverse colon mass: This is obviously concerning for malignancy scenario  No free air.  No free fluid. CT results were discussed with the patient. GI consulted  WBC 12.8 with a left shift of 9. 2(could be related to trichomoniasis, potential development of PID and or UTI)        Lower GI bleed: Hemoccult positive  Colon mass as identified on CT  GI consulted  Hold prophylactic anticoagulation  H&H stable   Patient has never had a colonoscopy. Had prep overnight, s/p colonoscopy with biopsy 4/11, await biopsy  Hem/onc consulted  gen surgery consulted - plan transverse colectomy and colostomy 4/13/22     Anemia - iron def anemia, occult positive. Started on IV venofer, GI consulted     Liver lesion: As evidenced on CT incidental finding  Liver enzymes unremarkable, AST slightly elevated at 53. GI consulted     Trichomonas: Positive UA  Flagyl 500 mg p.o. twice daily x7 days trichomoniasis treatment  GC pending  Should have outpatient GYN follow-up=> 2 weeks  I did express to her and to the boyfriend that he also requires treatment otherwise they will reinfect each other if he is not treated  With lower abdominal pain certainly PID should be considered, again GC is pending. Added Doxycycline till [facility] results available.   CT does not indicate any evidence of tubo-ovarian abscess. WBC 12.8, T-max 100. 2        Bacterial vaginosis: Clue cells on wet prep  Treatment: Flagyl-> continue current dose     UTI: Micro UA WBC 21-50 and 2+ bacteria, epithelial cells 21-50  Rocephin received in ED  Await culture for any further antibiotic coverage as this appears to be a contaminated specimen.  And the patient did not c/o dysuria.              DVT prophylaxis - SCD  Diet: Diet NPO Exceptions are: Sips of Water with Meds  Code Status: Full Code    PT/OT Eval Status: ordered    [facility]    Feli Dangelo MD

## 2022-04-13 NOTE — PLAN OF CARE
Problem: Pain:  Goal: Pain level will decrease  Description: Pain level will decrease  Outcome: Ongoing  Goal: Control of acute pain  Description: Control of acute pain  Outcome: Ongoing  Goal: Control of chronic pain  Description: Control of chronic pain  Outcome: Ongoing     Problem: Safety:  Goal: Free from accidental physical injury  Description: Free from accidental physical injury  Outcome: Ongoing  Goal: Free from intentional harm  Description: Free from intentional harm  Outcome: Ongoing     Problem: Daily Care:  Goal: Daily care needs are met  Description: Daily care needs are met  Outcome: Ongoing     Problem: Discharge Planning:  Goal: Patients continuum of care needs are met  Description: Patients continuum of care needs are met  Outcome: Ongoing     Problem: Nutrition  Goal: Optimal nutrition therapy  Outcome: Ongoing

## 2022-04-13 NOTE — ANESTHESIA POSTPROCEDURE EVALUATION
Department of Anesthesiology  Postprocedure Note    Patient: Jamey Rose  MRN: [id number]  YOB: 1980  Date of evaluation: 4/13/2022  Time:  6:42 PM     Procedure Summary     Date: 04/13/22 Room / Location: [facility] OR [address] [place] [address]    Anesthesia Start: 4345 Anesthesia Stop: 5390    Procedure: LAPAROSCOPIC TRANSVERSE COLECTOMY WITH COLOSTOMY, INSERTION OF PORT A CATHETER, LAPAROSCOPIC LIVER BIOPSY (N/A ) Diagnosis: (unknown)    Surgeons: Merly Nichols MD Responsible Provider: Becca House MD    Anesthesia Type: general ASA Status: 3          Anesthesia Type: general    Sisi Phase I: Sisi Score: 8    Sisi Phase II:      Last vitals: Reviewed and per EMR flowsheets.        Anesthesia Post Evaluation    Patient location during evaluation: PACU  Level of consciousness: awake and alert  Airway patency: patent  Nausea & Vomiting: no nausea and no vomiting  Complications: no  Cardiovascular status: blood pressure returned to baseline  Respiratory status: acceptable  Hydration status: euvolemic  Comments: Postoperative Anesthesia Note    Name:    Jamey Rose  MRN:      [id number]    Patient Vitals in the past 12 hrs:  04/13/22 1815, BP:(!) 140/76, Pulse:62, Resp:16, SpO2:100 %  04/13/22 1800, BP:(!) 142/72, Temp:97.2 °F (36.2 °C), Temp src:Temporal, Pulse:67, Resp:17, SpO2:100 %  04/13/22 1745, BP:136/74, Pulse:63, Resp:12, SpO2:100 %  04/13/22 1730, BP:(!) 141/74, Pulse:70, Resp:18, SpO2:100 %  04/13/22 1720, BP:139/83, Pulse:70, Resp:17, SpO2:100 %  04/13/22 1715, BP:(!) 153/81, Pulse:79, Resp:16, SpO2:100 %  04/13/22 1710, BP:(!) 156/80, Pulse:80, Resp:21, SpO2:100 %  04/13/22 1705, BP:(!) 140/79, Pulse:83, Resp:13, SpO2:100 %  04/13/22 1702, BP:136/83, Temp:97.4 °F (36.3 °C), Temp src:Temporal, Pulse:78, Resp:12, SpO2:99 %  04/13/22 1201, BP:122/77, Temp:98.1 °F (36.7 °C), Temp src:Temporal, Pulse:50, Resp:18, SpO2:100 %     LABS:    CBC  Lab Results Component                Value               Date/Time                  WBC                      6.2                 04/13/2022 07:14 AM        HGB                      10.3 (L)            04/13/2022 07:14 AM        HCT                      30.4 (L)            04/13/2022 07:14 AM        PLT                      277                 04/13/2022 07:14 AM   RENAL  Lab Results       Component                Value               Date/Time                  NA                       138                 04/09/2022 11:05 AM        K                        4.0                 04/09/2022 11:05 AM        CL                       102                 04/09/2022 11:05 AM        CO2                      20 (L)              04/09/2022 11:05 AM        BUN                      12                  04/09/2022 11:05 AM        CREATININE               0.9                 04/09/2022 11:05 AM        GLUCOSE                  98                  04/09/2022 11:05 AM   COAGS  Lab Results       Component                Value               Date/Time                  PROTIME                  13.1 (H)            04/13/2022 07:14 AM        INR                      1.15 (H)            04/13/2022 07:14 AM        APTT                     36.1                04/13/2022 07:14 AM     Intake & Output:  @41EMEW@    Nausea & Vomiting:  No    Level of Consciousness:  Awake    Pain Assessment:  Adequate analgesia    Anesthesia Complications:  No apparent anesthetic complications    SUMMARY      Vital signs stable  OK to discharge from Stage I post anesthesia care.   Care transferred from Anesthesiology department on discharge from perioperative area

## 2022-04-13 NOTE — CONSULTS
General Surgery Consult Note      Malik Card   : 1980 MRN: [id number]  Date of Admission: 2022  Admitting Physician:No admitting provider for patient encounter. Primary Care Physician: No primary care provider on file. Chief Complaint   Patient presents with    Abdominal Pain     generalized abdominal pain x2 weeks, patient reports some blood in her stool, also reports nausea and emesis. Arrives to ED with temperature of 100.2. Patietn reports increased abdominal pain with urination. 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 female who presented on 22 with a multiple year history of abdominal pain, acutely worsening over the past month which led her to come in to be evaluated. Her pain is across her mid to lower abdomen.  + nausea, vomiting. She is having bowel movements but has a history of constipation. She also noticed intermittent blood in her stool over the past 2 months. No weight gain or weight loss. No prior abdominal surgeries. Work up here has included two CTs, showing a transverse colon cancer with lymphadenopathy, along with liver masses consistent with liver metastasis. She had a colonoscopy yesterday showing an obstructing transverse colon mass 70 cm from the anal verge that was biopsied and tattooed. Biopsies show adenocarcinoma. Past Medical History:   Diagnosis Date    Asthma      Past Surgical History:   Procedure Laterality Date    COLONOSCOPY N/A 2022    COLONOSCOPY WITH BIOPSY performed by Erin Hernandez MD at [address]  2022    COLONOSCOPY SUBMUCOSAL tattoo  INJECTION performed by Erin Hernandez MD at [address] history reviewed and otherwise negative. History reviewed.  No pertinent family history. Social History     Socioeconomic History    Marital status: Single     Spouse name: Not on file    Number of children: Not on file    Years of education: Not on file    Highest education level: Not on file   Occupational History    Not on file   Tobacco Use    Smoking status: Never Smoker    Smokeless tobacco: Never Used   Substance and Sexual Activity    Alcohol use: Yes     Comment: socially    Drug use: No    Sexual activity: Not on file   Other Topics Concern    Not on file   Social History Narrative    Not on file     Social Determinants of Health     Financial Resource Strain:     Difficulty of Paying Living Expenses: Not on file   Food Insecurity:     Worried About Running Out of Food in the Last Year: Not on file    Jose of Food in the Last Year: Not on file   Transportation Needs:     Lack of Transportation (Medical): Not on file    Lack of Transportation (Non-Medical):  Not on file   Physical Activity:     Days of Exercise per Week: Not on file    Minutes of Exercise per Session: Not on file   Stress:     Feeling of Stress : Not on file   Social Connections:     Frequency of Communication with Friends and Family: Not on file    Frequency of Social Gatherings with Friends and Family: Not on file    Attends Hindu Services: Not on file    Active Member of [address] [place] [address] [employer] or Organizations: Not on file    Attends Club or Organization Meetings: Not on file    Marital Status: Not on file   Intimate Partner Violence:     Fear of Current or Ex-Partner: Not on file    Emotionally Abused: Not on file    Physically Abused: Not on file    Sexually Abused: Not on file   Housing Stability:     Unable to Pay for Housing in the Last Year: Not on file    Number of [place] in the Last Year: Not on file    Unstable Housing in the Last Year: Not on file     Allergies   Allergen Reactions    Codeine Hives    Pcn [Penicillins] Hives     Prior to Admission medications    Not on File       Review of Systems  Pertinent positives and negatives are in HPI, otherwise all systems reviewed and negative    Physical Exam  Vitals:    04/12/22 2057   BP: (!) 158/72   Pulse: 61   Resp: 18   Temp: 98 °F (36.7 °C)   SpO2: 97%         Intake/Output Summary (Last 24 hours) at 4/12/2022 2107  Last data filed at 4/12/2022 1000  Gross per 24 hour   Intake 874.13 ml   Output --   Net 874.13 ml       Body mass index is 37.82 kg/m². General/Appearance: NAD, appears stated age  [de-identified]: PERRLA, Conjunctiva non injected, no scleral icterus. Mucous membranes pink and moist.  Neck is symmetrical. Trachea appears midline. Lung: normal respiratory effort, no accessory muscle use  Cardiac: Regular rate and rhythm  Abdomen: soft, ND, minimal bilateral abdominal tenderness, no peritonitis  Neuro: No gross motor or sensory deficits. Skin: No open wounds or rashes. Labs  Recent Labs     04/10/22  0713   WBC 9.6   HGB 10.9*   HCT 32.3*        No results for input(s): NA, K, CL, CO2, AGAP, GLU, BUN, GFRAA, MG, PHOS in the last 72 hours. Invalid input(s): CRET, GFRNA  No results for input(s): TP, ALB, GLOB, AST, ALT in the last 72 hours. Invalid input(s): DBIL, TBIL, AGRAT, GPT, MARIBEL, LIP  No results for input(s): UOSM in the last 72 hours. Invalid input(s): UAPR, UCOL, Akira José, [place], [place], [place], [facility]    Imaging  CT ABDOMEN W CONTRAST Additional Contrast? Radiologist Recommendation   Final Result   1. Disseminated hepatic metastatic disease. 2. Nonspecific distal esophageal wall thickening. Endoscopy recommended to   further evaluate. Neoplasm is a diagnostic consideration. RECOMMENDATIONS:   Unavailable         CT ABDOMEN PELVIS WO CONTRAST Additional Contrast? None   Final Result   1. Multiple hepatic lesions most consistent with metastatic disease. Findings concerning for an annular mass in the distal transverse colon,   likely a colonic primary with adjacent adenopathy.    2. No other acute findings within the abdomen or pelvis. IR BIOPSY LIVER PERCUTANEOUS    (Results Pending)    CT abd/pelvis personally reviewed, along with CT abd with IV contrast, showing distal transverse colon cancer with lymphadenopathy and liver metastasis    Assessment  Cristiane Fung is a 36 y.o. 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 I think she would benefit from port placement for treatment of her metastatic disease, along with colectomy for her obstructing colon cancer. We discussed surgical options of colectomy with anastomosis vs colectomy with colostomy. The risks, benefits, and alternatives were discussed with the patient. The patient would like to pursue a laparoscopic transverse colectomy with colostomy. This will allow her to heal faster without worrying about anastomotic breakdown and hopefully start chemotherapy sooner. NPO after midnight. cipro and ancef IV ordered, continue oral flagyl and doxycycline. Bilateral SCDS  2.  Hx of asthma   · Stable, management per primary team    Sylvain Kasper MD

## 2022-04-13 NOTE — PROGRESS NOTES
Pt out of room for procedure. Verified consent signed and placed in chart. Pre-procedure checklist completed.     Electronically signed by Davidson Dee RN on 4/13/22 at 11:48 AM EDT

## 2022-04-13 NOTE — PROGRESS NOTES
General Surgery  Daily Progress Note    Pt Name: Claudeen Cordoba  Medical Record Number: [id number]  Date of Birth 1980   Today's Date: 4/13/2022    Chief Complaint   Patient presents with    Abdominal Pain     generalized abdominal pain x2 weeks, patient reports some blood in her stool, also reports nausea and emesis. Arrives to ED with temperature of 100.2. Patietn reports increased abdominal pain with urination. ASSESSMENT/PLAN  1. 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 The risks, benefits, and alternatives were discussed with the patient and she is willing to consent for the operation. Ancef and ciprofloxacin ordered preoperatively. Bilateral SCDs. Yeboah to be placed in the OR. 2. Hx of asthma - per primary team      Monica Arevalo is unchanged from yesterday. Pain is well controlled. She has no nausea and no vomiting. She is tolerating regular diet. Current activity is ad jaylene    OBJECTIVE  VITALS:  height is 5' 6\" (1.676 m) and weight is 234 lb 5.6 oz (106.3 kg). Her oral temperature is 98.5 °F (36.9 °C). Her blood pressure is 139/62 and her pulse is 57. Her respiration is 18 and oxygen saturation is 94%. GENERAL: alert, no distress  LUNGS: normal respiratory effort, no accessory muscle use  ABDOMEN: soft, NT, ND  EXTREMITY: no cyanosis and no clubbing  I/O last 3 completed shifts: In: 874.1 [P.O.:120; I.V.:704.1; IV Piggyback:50]  Out: -   No intake/output data recorded.     LABS  Recent Labs     04/13/22  0714   WBC 6.2   HGB 10.3*   HCT 30.4*      INR 1.15*     CBC with Differential:    Lab Results   Component Value Date    WBC 6.2 04/13/2022    RBC 3.20 04/13/2022    HGB 10.3 04/13/2022    HCT 30.4 04/13/2022     04/13/2022    MCV 94.9 04/13/2022    MCH 32.2 04/13/2022    MCHC 33.9 04/13/2022    RDW 13.0 04/13/2022    SEGSPCT 69.8 06/06/2013    LYMPHOPCT 20.6 04/13/2022    MONOPCT 11.0 04/13/2022    BASOPCT 0.6 04/13/2022    MONOSABS 0.7 04/13/2022    LYMPHSABS 1.3 04/13/2022    EOSABS 0.4 04/13/2022    BASOSABS 0.0 04/13/2022    DIFFTYPE Auto 06/06/2013     BMP:    Lab Results   Component Value Date     04/09/2022    K 4.0 04/09/2022     04/09/2022    CO2 20 04/09/2022    BUN 12 04/09/2022    LABALBU 3.9 04/09/2022    CREATININE 0.9 04/09/2022    CALCIUM 9.9 04/09/2022    GFRAA >60 04/09/2022    GFRAA >60 06/06/2013    LABGLOM >60 04/09/2022    GLUCOSE 98 04/09/2022     Hepatic Function Panel:    Lab Results   Component Value Date    ALKPHOS 77 04/09/2022    ALT 25 04/09/2022    AST 53 04/09/2022    PROT 7.0 04/09/2022    BILITOT 0.6 04/09/2022    LABALBU 3.9 04/09/2022         Alicia Murry MD  Electronically signed 4/13/2022 at 11:10 AM

## 2022-04-13 NOTE — PROGRESS NOTES
Pt back up to room from OR/PACU. Pt drowsy but easily awoken with speech. VSS. New colostomy with minimal red output, stoma moist and pink. Four laparoscopic site clean, dry, intact. New left upper chest port site clean, dry, intact. Family at bedside.     Electronically signed by Long Roach RN on 4/13/22 at 7:01 PM EDT

## 2022-04-14 ENCOUNTER — APPOINTMENT (OUTPATIENT)
Dept: CT IMAGING | Age: 42
DRG: 231 | End: 2022-04-14
Payer: COMMERCIAL

## 2022-04-14 LAB
ANION GAP SERPL CALCULATED.3IONS-SCNC: 13 MMOL/L (ref 3–16)
BASOPHILS ABSOLUTE: 0 K/UL (ref 0–0.2)
BASOPHILS RELATIVE PERCENT: 0.4 %
BUN BLDV-MCNC: 10 MG/DL (ref 7–20)
CALCIUM SERPL-MCNC: 8.2 MG/DL (ref 8.3–10.6)
CHLORIDE BLD-SCNC: 109 MMOL/L (ref 99–110)
CO2: 17 MMOL/L (ref 21–32)
CREAT SERPL-MCNC: 0.7 MG/DL (ref 0.6–1.1)
EOSINOPHILS ABSOLUTE: 0 K/UL (ref 0–0.6)
EOSINOPHILS RELATIVE PERCENT: 0.2 %
GFR AFRICAN AMERICAN: >60
GFR NON-AFRICAN AMERICAN: >60
GLUCOSE BLD-MCNC: 85 MG/DL (ref 70–99)
HCT VFR BLD CALC: 28.6 % (ref 36–48)
HEMOGLOBIN: 9.9 G/DL (ref 12–16)
LYMPHOCYTES ABSOLUTE: 1.3 K/UL (ref 1–5.1)
LYMPHOCYTES RELATIVE PERCENT: 14.1 %
MCH RBC QN AUTO: 33.1 PG (ref 26–34)
MCHC RBC AUTO-ENTMCNC: 34.6 G/DL (ref 31–36)
MCV RBC AUTO: 95.6 FL (ref 80–100)
MONOCYTES ABSOLUTE: 0.8 K/UL (ref 0–1.3)
MONOCYTES RELATIVE PERCENT: 8.5 %
NEUTROPHILS ABSOLUTE: 7.4 K/UL (ref 1.7–7.7)
NEUTROPHILS RELATIVE PERCENT: 76.8 %
PDW BLD-RTO: 13.3 % (ref 12.4–15.4)
PLATELET # BLD: 271 K/UL (ref 135–450)
PMV BLD AUTO: 7.9 FL (ref 5–10.5)
POTASSIUM REFLEX MAGNESIUM: 3.7 MMOL/L (ref 3.5–5.1)
RBC # BLD: 3 M/UL (ref 4–5.2)
SODIUM BLD-SCNC: 139 MMOL/L (ref 136–145)
WBC # BLD: 9.6 K/UL (ref 4–11)

## 2022-04-14 PROCEDURE — 1200000000 HC SEMI PRIVATE

## 2022-04-14 PROCEDURE — 6360000002 HC RX W HCPCS: Performed by: SURGERY

## 2022-04-14 PROCEDURE — 80048 BASIC METABOLIC PNL TOTAL CA: CPT

## 2022-04-14 PROCEDURE — 2580000003 HC RX 258: Performed by: SURGERY

## 2022-04-14 PROCEDURE — 99024 POSTOP FOLLOW-UP VISIT: CPT | Performed by: SURGERY

## 2022-04-14 PROCEDURE — 6370000000 HC RX 637 (ALT 250 FOR IP): Performed by: SURGERY

## 2022-04-14 PROCEDURE — 2580000003 HC RX 258: Performed by: NURSE PRACTITIONER

## 2022-04-14 PROCEDURE — APPSS45 APP SPLIT SHARED TIME 31-45 MINUTES: Performed by: NURSE PRACTITIONER

## 2022-04-14 PROCEDURE — 71250 CT THORAX DX C-: CPT

## 2022-04-14 PROCEDURE — 85025 COMPLETE CBC W/AUTO DIFF WBC: CPT

## 2022-04-14 PROCEDURE — APPNB45 APP NON BILLABLE 31-45 MINUTES: Performed by: NURSE PRACTITIONER

## 2022-04-14 PROCEDURE — 99024 POSTOP FOLLOW-UP VISIT: CPT | Performed by: NURSE PRACTITIONER

## 2022-04-14 PROCEDURE — 36415 COLL VENOUS BLD VENIPUNCTURE: CPT

## 2022-04-14 RX ORDER — 0.9 % SODIUM CHLORIDE 0.9 %
1000 INTRAVENOUS SOLUTION INTRAVENOUS ONCE
Status: COMPLETED | OUTPATIENT
Start: 2022-04-14 | End: 2022-04-14

## 2022-04-14 RX ADMIN — KETOROLAC TROMETHAMINE 30 MG: 30 INJECTION, SOLUTION INTRAMUSCULAR at 14:09

## 2022-04-14 RX ADMIN — DOXYCYCLINE HYCLATE 100 MG: 100 TABLET, FILM COATED ORAL at 21:45

## 2022-04-14 RX ADMIN — SODIUM CHLORIDE 1000 ML: 9 INJECTION, SOLUTION INTRAVENOUS at 15:52

## 2022-04-14 RX ADMIN — KETOROLAC TROMETHAMINE 30 MG: 30 INJECTION, SOLUTION INTRAMUSCULAR at 06:49

## 2022-04-14 RX ADMIN — SODIUM CHLORIDE, POTASSIUM CHLORIDE, SODIUM LACTATE AND CALCIUM CHLORIDE: 600; 310; 30; 20 INJECTION, SOLUTION INTRAVENOUS at 18:06

## 2022-04-14 RX ADMIN — HYDROMORPHONE HYDROCHLORIDE 0.5 MG: 1 INJECTION, SOLUTION INTRAMUSCULAR; INTRAVENOUS; SUBCUTANEOUS at 12:30

## 2022-04-14 RX ADMIN — ONDANSETRON 4 MG: 2 INJECTION INTRAMUSCULAR; INTRAVENOUS at 21:54

## 2022-04-14 RX ADMIN — DOCUSATE SODIUM 100 MG: 100 CAPSULE, LIQUID FILLED ORAL at 21:46

## 2022-04-14 RX ADMIN — Medication 12.5 MG: at 14:29

## 2022-04-14 RX ADMIN — METRONIDAZOLE 500 MG: 500 TABLET ORAL at 21:46

## 2022-04-14 RX ADMIN — SODIUM CHLORIDE, POTASSIUM CHLORIDE, SODIUM LACTATE AND CALCIUM CHLORIDE: 600; 310; 30; 20 INJECTION, SOLUTION INTRAVENOUS at 06:49

## 2022-04-14 RX ADMIN — HYDROMORPHONE HYDROCHLORIDE 0.5 MG: 1 INJECTION, SOLUTION INTRAMUSCULAR; INTRAVENOUS; SUBCUTANEOUS at 21:47

## 2022-04-14 RX ADMIN — ACETAMINOPHEN 1000 MG: 325 TABLET ORAL at 21:45

## 2022-04-14 RX ADMIN — ACETAMINOPHEN 1000 MG: 325 TABLET ORAL at 12:29

## 2022-04-14 RX ADMIN — OXYCODONE HYDROCHLORIDE 10 MG: 10 TABLET ORAL at 02:59

## 2022-04-14 RX ADMIN — METRONIDAZOLE 500 MG: 500 TABLET ORAL at 09:14

## 2022-04-14 RX ADMIN — PANTOPRAZOLE SODIUM 40 MG: 40 TABLET, DELAYED RELEASE ORAL at 06:49

## 2022-04-14 RX ADMIN — ACETAMINOPHEN 1000 MG: 325 TABLET ORAL at 05:04

## 2022-04-14 RX ADMIN — CIPROFLOXACIN 400 MG: 2 INJECTION, SOLUTION INTRAVENOUS at 09:13

## 2022-04-14 RX ADMIN — DOXYCYCLINE HYCLATE 100 MG: 100 TABLET, FILM COATED ORAL at 09:13

## 2022-04-14 RX ADMIN — KETOROLAC TROMETHAMINE 30 MG: 30 INJECTION, SOLUTION INTRAMUSCULAR at 01:56

## 2022-04-14 RX ADMIN — DOCUSATE SODIUM 100 MG: 100 CAPSULE, LIQUID FILLED ORAL at 09:13

## 2022-04-14 RX ADMIN — KETOROLAC TROMETHAMINE 30 MG: 30 INJECTION, SOLUTION INTRAMUSCULAR at 18:12

## 2022-04-14 RX ADMIN — ONDANSETRON 4 MG: 2 INJECTION INTRAMUSCULAR; INTRAVENOUS at 10:27

## 2022-04-14 ASSESSMENT — PAIN DESCRIPTION - PROGRESSION
CLINICAL_PROGRESSION: NOT CHANGED

## 2022-04-14 ASSESSMENT — PAIN DESCRIPTION - FREQUENCY
FREQUENCY: CONTINUOUS

## 2022-04-14 ASSESSMENT — PAIN DESCRIPTION - LOCATION
LOCATION: ABDOMEN

## 2022-04-14 ASSESSMENT — PAIN DESCRIPTION - ORIENTATION
ORIENTATION: LEFT;LOWER;UPPER

## 2022-04-14 ASSESSMENT — PAIN DESCRIPTION - DESCRIPTORS
DESCRIPTORS: ACHING;SHARP
DESCRIPTORS: ACHING;SHARP;STABBING
DESCRIPTORS: ACHING;SHARP;STABBING
DESCRIPTORS: ACHING;SHARP

## 2022-04-14 ASSESSMENT — PAIN - FUNCTIONAL ASSESSMENT
PAIN_FUNCTIONAL_ASSESSMENT: PREVENTS OR INTERFERES SOME ACTIVE ACTIVITIES AND ADLS

## 2022-04-14 ASSESSMENT — PAIN DESCRIPTION - ONSET
ONSET: ON-GOING

## 2022-04-14 ASSESSMENT — PAIN DESCRIPTION - PAIN TYPE
TYPE: SURGICAL PAIN

## 2022-04-14 ASSESSMENT — PAIN SCALES - GENERAL
PAINLEVEL_OUTOF10: 7
PAINLEVEL_OUTOF10: 4
PAINLEVEL_OUTOF10: 7
PAINLEVEL_OUTOF10: 10
PAINLEVEL_OUTOF10: 5
PAINLEVEL_OUTOF10: 7
PAINLEVEL_OUTOF10: 5
PAINLEVEL_OUTOF10: 7
PAINLEVEL_OUTOF10: 5
PAINLEVEL_OUTOF10: 7

## 2022-04-14 ASSESSMENT — PAIN SCALES - WONG BAKER
WONGBAKER_NUMERICALRESPONSE: 2
WONGBAKER_NUMERICALRESPONSE: 2

## 2022-04-14 NOTE — OP NOTE
[address] [place] [address]                                OPERATIVE REPORT    PATIENT NAME: Akira Paulino                     :        1980  MED REC NO:   [id number]                          ROOM:       [address] 4.2  ACCOUNT NO:   [de-identified]                           ADMIT DATE: 2022  PROVIDER:     Emely Kee MD      DATE OF PROCEDURE:  2022    PREOPERATIVE DIAGNOSIS:  Metastatic near-obstructing transverse colon  cancer. POSTOPERATIVE DIAGNOSIS:  Metastatic near-obstructing transverse colon  cancer. OPERATION PERFORMED:  1. Insertion of tunneled left internal jugular Port-A-Cath placement  with ultrasound and fluoroscopic guidance. 2.  Laparoscopic transverse colectomy with end colostomy. 3.  Laparoscopic wedge biopsy of liver mass. SURGEON:  Emely Kee MD    ANESTHESIA:  General endotracheal.    ASA CLASS:  III. DVT PROPHYLAXIS:  The patient was wearing bilateral SCDs. ANTIBIOTICS:  The patient received Ancef 2 gm IV and Cipro 400 mg IV  preoperatively in addition to being on therapeutic Flagyl by mouth every  8 hours. INDICATIONS:  The patient is a 79-year-old female with multiple-year  history of chronic constipation with progressively worsening abdominal  pain particularly over the past 4 to 6 weeks. She has also noticed  intermittent blood in her stool over the past two months. Upon  presentation, CT imaging revealed a transverse colon mass along with  multiple nodules or masses of the liver concerning for metastatic  disease. The patient had a colonoscopy with an obstructing mass found  at 70 cm consistent with CT findings. Due to the near obstruction along  with suspected metastatic disease, the risks, benefits, and alternatives  were explained to the patient. She was willing to consent for the  procedure.     OPERATIVE PROCEDURE:  After informed consent was obtained, the patient  was brought to the operating room and placed in the supine position. General anesthesia was induced. A Yeboah catheter was then placed. We  then placed the patient in Trendelenburg and her chest and neck were  prepped and draped in the usual sterile fashion for the port placement. A timeout was then performed. We first started with an incision over the left chest wall. This was  carried down to the deltopectoral groove. We attempted to do a cephalic  vein cutdown but were unable to feed the catheter more than a centimeter  or two into the vein. We therefore went ahead and tied off the cephalic  vein. We then attempted the left subclavian approach, however on two  attempts, we aborted that procedure. We then went ahead and took a look  at the internal jugular vein on the left with the ultrasound. This was  opened and easily accessed using a modified Seldinger technique. A  Glidewire was placed that was confirmed with fluoroscopy to be in the  superior vena cava. We then went ahead and created our port in the left chest port pocket in  the left chest wall through our prior incision. We then tunneled a  catheter from the port site up to the neck. A sheath and dilator were  then placed over the guidewire using fluoroscopy for guidance. Once  this was in place, the wire and dilator were removed and the catheter  was able to be placed. This sheath was then removed and we evaluated  the catheter under fluoroscopy. This was backed out to approximately 18  cm and the extra catheter was then pulled back through the port site. This david and flushed appropriately. We then went ahead and transected the catheter to approximately 32 cm  and attached the port. This was then secured to the chest wall using  3-0 Prolene sutures. The port was then accessed and david and flushed  easily. Final heparin flush was then placed.   We did confirm with  fluoroscopy that the port was in good position with a gentle transition  of the catheter and tip located in the superior vena cava. We then  closed the port site with multiple layers of 3-0 Vicryl and running 4-0  Vicryl. Dermabond was placed over top of the port site along with the  left neck site. We then transitioned to the abdominal portion of the  surgery. The patient was reprepped and draped and the procedure was confirmed by  all in the room. We first started with an infraumbilical incision. A  penetrating towel clip was used to elevate the abdominal wall and a  Veress needle was inserted in the abdominal cavity. The abdomen was  then insufflated with carbon dioxide gas to a pressure of 15 mmHg. A  trocar was then introduced followed by a camera. There was no evidence  of injury with our Veress needle or trocar placement. We then placed a  12-mm trocar in the left midclavicular line and then two additional 5 mm  trocars in the right upper quadrant. Through these, we began to  evaluate the abdominal cavity. Within the liver there were numerous  nodules underneath the surface and up to the surface. We then inspected  the transverse colon. The patient had a very redundant omentum. This  was reflected superiorly. We were able to see the mass in the distal  transverse colon along with the tattoo site. We first started by taking the omentum off of the transverse colon at  the falciform ligament. We then carefully moved distally taking care to  avoid injury to the colon. We were able to get into the lesser sac  posterior to the stomach. This omentum was reflected all the way  superiorly, and we were able to get distal to our tattoo line. We then  went ahead and elevated the transverse colon. We wanted to include the  lymphadenopathy seen on CT imaging around the area of the colon itself. We started by dividing the mid to distal transverse mesocolon using a  LigaSure.   This was then carried distally going past the mass and then  coming up to the distal transverse colon at the inked site. We then made our connection between the lesser sac and the opening in  the transverse mesocolon. This was then taken proximally. We were able  to bring the transverse colon easily up to the anterior abdominal wall  for future colostomy. We then went ahead and divided the transverse  colon. This was done at the ink site distal to the tumor. An [place]  60 stapler with blue load was used to divide the colon. This was able  to be performed with a single load. We then placed the end of the  transverse colon including the mass into an Endopouch bag to facilitate  removal later. At this point, we inspected our dissection planes. Everything appeared to be hemostatic with minimal oozing during the  procedure itself. We then evaluated the liver. We selected a liver nodule just adjacent  to the falciform which was superficial without any overlying liver. We  went ahead and using the laparoscopic Metzenbaum scissors took out a  wedge of the mass itself. This was then grasped and pulled out through  one of the trocars and sent separately as a specimen. Electrocautery  was then used for hemostasis. This was viewed multiple times after the  removal without any evidence of ongoing bleeding. At this point, we then elevated the abdominal wall for our colostomy  site. This was made in the left upper quadrant through the rectus  muscle. The skin was elevated and circular incision was made. Electrocautery was then used to divide through the subcutaneous tissue  down to the fascia. A cruciate incision was then made and the rectus  fibers were splayed laterally to be able to expose the posterior fascia. This was then incised with electrocautery. We then grasped the sutures  for the Endo Catch bag. The posterior sheath was then dilated and we  attempted to remove the transverse colon mass through the abdominal  wall.   We were having some difficulty, and I did not want to have this  specimen opened and also did not want to significantly dilate the  colostomy site. We ended opening a small wound protector to be able to  slide into this space. With some manipulation, we then were able to get  out the transverse colon mass back to healthy viable transverse colon. The abdomen was then inspected. There was no evidence of bleeding with  manipulation of the transverse colon coming through the abdominal wall. We then went ahead and evaluated the liver biopsy site which was  hemostatic. We then suctioned out all free fluid and then closed the  12-mm left sideport with a Ranfac suture passer and 0 Vicryl in a  figure-of-eight. This reapproximated the fascial edges well. The  abdomen was then allowed to deflate. The trocar sites were closed with  4-0 Vicryl suture and Dermabond were placed over top of the sites. We then went ahead and matured our colostomy. The Doppler was used to  evaluate the mesentery and we divided the mesentery just proximal to the  colon cancer. We did check with a Doppler after dividing the mesentery  with the LigaSure to ensure that the remaining colon had pulsatile blood  flow and it did. We then fully transected the colon and removed it as a  specimen. It was evaluated on the back table and did appear to have the  entire colon cancer present within it. We then went ahead and matured  the colostomy. 3-0 Vicryl sutures were placed in the cardinal direction  points between the wall of the colon and deep dermal layer. These were  then tied into place. We then placed intermediate 3-0 Vicryl sutures in  order to fully mature our colostomy. An ostomy appliance was placed  over top of the site. Marcaine was injected at the trocar sites along  with around the ostomy. Overall, the patient tolerated the procedure  well and was taken to recovery room in stable condition.     FINDINGS:  Distal transverse colon cancer just proximal to tattoo polina  and numerous liver nodules consistent with liver metastasis. ESTIMATED BLOOD LOSS:  75 mL    COMPLICATIONS:  None. SPECIMENS:  1. Liver mass. 2.  Transverse colon.         Lamar Reddy MD    D: 04/13/2022 17:54:28       T: 04/14/2022 1:15:34     [email or web address]  Job#: [id number]     Doc#: [id number]    CC:

## 2022-04-14 NOTE — PROGRESS NOTES
[facility]  Oncology Hematology Care  Progress Note  Late entry pt seen 4/13 around 8am     SUBJECTIVE:   PT to go for [facility] this am for palliative assistance with the primary   She also will have a port and liver biopsy thus IR Biopsy cancelled     OBJECTIVE      Medications    Current Facility-Administered Medications: ciprofloxacin (CIPRO) IVPB 400 mg, 400 mg, IntraVENous, Q12H  lactated ringers infusion, , IntraVENous, Continuous  ketorolac (TORADOL) injection 30 mg, 30 mg, IntraVENous, Q6H  acetaminophen (TYLENOL) tablet 1,000 mg, 1,000 mg, Oral, Q8H  oxyCODONE (ROXICODONE) immediate release tablet 5 mg, 5 mg, Oral, Q4H PRN **OR** oxyCODONE HCl (OXY-IR) immediate release tablet 10 mg, 10 mg, Oral, Q4H PRN  HYDROmorphone (DILAUDID) injection 0.5 mg, 0.5 mg, IntraVENous, Q2H PRN **OR** HYDROmorphone (DILAUDID) injection 1 mg, 1 mg, IntraVENous, Q2H PRN  docusate sodium (COLACE) capsule 100 mg, 100 mg, Oral, BID  pantoprazole (PROTONIX) tablet 40 mg, 40 mg, Oral, QAM AC  promethazine (PHENERGAN) 12.5mg in sodium chloride 0.9% 50 mL IVPB 12.5 mg, 12.5 mg, IntraVENous, Q6H PRN  sodium chloride flush 0.9 % injection 5-40 mL, 5-40 mL, IntraVENous, 2 times per day  sodium chloride flush 0.9 % injection 5-40 mL, 5-40 mL, IntraVENous, PRN  0.9 % sodium chloride infusion, , IntraVENous, PRN  ondansetron (ZOFRAN-ODT) disintegrating tablet 4 mg, 4 mg, Oral, Q8H PRN **OR** ondansetron (ZOFRAN) injection 4 mg, 4 mg, IntraVENous, Q6H PRN  acetaminophen (TYLENOL) tablet 650 mg, 650 mg, Oral, Q6H PRN **OR** acetaminophen (TYLENOL) suppository 650 mg, 650 mg, Rectal, Q6H PRN  metroNIDAZOLE (FLAGYL) tablet 500 mg, 500 mg, Oral, 2 times per day  dicyclomine (BENTYL) capsule 20 mg, 20 mg, Oral, Q6H PRN  doxycycline hyclate (VIBRA-TABS) tablet 100 mg, 100 mg, Oral, 2 times per day  Physical    VITALS:  /78   Pulse 66   Temp 98 °F (36.7 °C)   Resp 18   Ht 5' 6\" (1.676 m)   Wt 234 lb 5.6 oz (106.3 kg)   LMP 04/10/2022 SpO2 100%   BMI 37.82 kg/m²   TEMPERATURE:  Current - Temp: 98 °F (36.7 °C); Max - Temp  Av.6 °F (35.9 °C)  Min: 57.9 °F (14.4 °C)  Max: 98.4 °F (36.9 °C)  PULSE OXIMETRY RANGE: SpO2  Av.6 %  Min: 93 %  Max: 100 %  24HR INTAKE/OUTPUT:    Intake/Output Summary (Last 24 hours) at 2022 0857  Last data filed at 2022 1800  Gross per 24 hour   Intake 1200 ml   Output 950 ml   Net 250 ml       CONSTITUTIONAL:  awake, alert, cooperative, no apparent distress, HEENT oral pharynx , no scleral icterus  HEMATOLOGIC/LYMPHATICS:  no cervical lymphadenopathy, no supraclavicular lymphadenopathy,   LUNGS:  No increased work of breathing, good air exchange, clear to auscultation bilaterally, no crackles or wheezing  CARDIOVASCULAR:  , regular rate and rhythm, normal S1 and S2, no S3 or S4, and no murmur noted  ABDOMEN:  No scars, normal bowel sounds, soft, non-distended, non-tender, no masses palpated, no hepatosplenomegally  MUSCULOSKELETAL:  There is no redness, warmth, or swelling of the joints. EXTREMETIES: No clubbing cynosis or edema  NEUROLOGIC:  Awake, alert, oriented to name, place and time. Cranial nerves II-XII are grossly intact. SKIN:  no bruising or bleeding      Data      Recent Labs     22  0714   WBC 6.2   HGB 10.3*   HCT 30.4*      MCV 94.9        No results for input(s): NA, K, CL, CO2, PHOS, BUN, CREATININE, CA in the last 72 hours. No results for input(s): AST, ALT, ALB, BILIDIR, BILITOT, ALKPHOS in the last 72 hours. Magnesium:  No results found for: MG    Imaging CT ABDOMEN PELVIS WO CONTRAST Additional Contrast? None    Result Date: 4/10/2022  EXAMINATION: CT OF THE ABDOMEN AND PELVIS WITHOUT CONTRAST 2022 12:39 pm TECHNIQUE: CT of the abdomen and pelvis was performed without the administration of intravenous contrast. Multiplanar reformatted images are provided for review.  Dose modulation, iterative reconstruction, and/or weight based adjustment of the mA/kV was 1. Multiple hepatic lesions most consistent with metastatic disease. Findings concerning for an annular mass in the distal transverse colon, likely a colonic primary with adjacent adenopathy. 2. No other acute findings within the abdomen or pelvis. CT ABDOMEN W CONTRAST Additional Contrast? Radiologist Recommendation    Result Date: 4/11/2022  EXAMINATION: CT ABDOMEN WITH CONTRAST 4/11/2022 9:27 am TECHNIQUE: CT of the abdomen was performed with the administration of intravenous contrast. Multiplanar reformatted images are provided for review. Dose modulation, iterative reconstruction, and/or weight based adjustment of the mA/kV was utilized to reduce the radiation dose to as low as reasonably achievable. COMPARISON: 04/09/2022 HISTORY: ORDERING SYSTEM PROVIDED HISTORY: triple phase CT of the liver to assess liver masses TECHNOLOGIST PROVIDED HISTORY: Reason for exam:->triple phase CT of the liver to assess liver masses Additional Contrast?->Radiologist Recommendation Reason for Exam: triple phase CT of the liver to assess liver masses, patient had w/o scan 4/9 FINDINGS: Lower Chest: There is no consolidation or effusion. There is mild circumferential wall thickening involving the distal esophagus just above the GE junction with thickness approaching 8 mm. There is a small 1.3 cm necrotic lymph node within the gastrohepatic ligament. Organs: There are multiple low-attenuation soft tissue masses scattered throughout the liver. The largest is within the left hepatic dome and measures 6 by 4 cm. There is a small 13 mm cystic lesion within the pancreatic tail with Hounsfield units of 16. The remainder of the solid abdominal organs are unremarkable. GI/Bowel: There is no bowel dilatation, wall thickening or obstruction. Peritoneum/Retroperitoneum: There is no free air, free fluid or intraperitoneal inflammatory change. Bones/Soft Tissues: There is no acute fracture or aggressive osseous lesion.      1. Disseminated hepatic metastatic disease. 2. Nonspecific distal esophageal wall thickening. Endoscopy recommended to further evaluate. Neoplasm is a diagnostic consideration. RECOMMENDATIONS: Unavailable     XR CHEST PORTABLE    Result Date: 4/13/2022  EXAM: XR Chest, 1 View EXAM DATE/TIME: 4/13/2022 5:02 pm CLINICAL HISTORY: ORDERING SYSTEM PROVIDED  s/p port insertion  TECHNOLOGIST PROVIDED HISTORY: Reason for exam:->s/p port insertion  Reason for Exam: s/p port insertion TECHNIQUE: Frontal view of the chest. COMPARISON: 12/01/2014 FINDINGS: Lungs:  No acute findings. No consolidation. Pleural space:  No acute findings. No pneumothorax. Heart:  No acute findings. No cardiomegaly. Mediastinum:  No acute findings. Bones/joints:  No acute findings. Tubes, lines and devices:  Left-sided chest port has been placed via the left internal jugular vein with the tip in the lower right atrium. A loop of the catheter extends cephalad in the neck and is not included on this study and therefore abnormalities of the catheter in this area, such as kinking cannot be excluded. 1.  Left-sided chest port has been placed via the left internal jugular vein with the tip in the lower right atrium. A loop of the catheter extends cephalad in the neck and is not included on this study and therefore abnormalities of the catheter in this area, such as kinking cannot be excluded. No evidence of pneumothorax. 2.  Otherwise no acute pulmonary disease or significant change noted. [facility] VASCULAR ACCESS GUIDANCE    Result Date: 4/13/2022  Radiology exam is complete. No Radiologist dictation. Please follow up with ordering provider.      Colonoscopy    Result Date: 4/11/2022  No dictation       Problem List  Patient Active Problem List   Diagnosis    Lower GI bleed    Trichimoniasis    Liver lesion    Colonic mass    Rectal bleeding    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 [facility])       ASSESSMENT AND PLAN    Colon cancer Stage IV -liver biopsy pending but expect this  TO have colectomy today to relieve obstruction   Liver biopsy for staging and caris testing   POrt placement   Appreciate Dr Cj Carroll MD, MD

## 2022-04-14 NOTE — PROGRESS NOTES
[facility] General Surgery                                   Daily Progress Note                                                         Pt Name: Wayland Nissen  Medical Record Number: [id number]  Date of Birth 1980   Today's Date: 4/14/2022    ASSESSMENT/PLAN  Metastatic near obstructing transverse colon cancer  -POD #1 left IJ PAC, lap transverse colectomy with end colostomy, lap wedge biopsy of liver mass  -follow path  -general diet as tolerated. -OOB, ambulate, ostomy teaching  -pain control  -Appreciate Ostomy RN teaching  -await return of GI function            SUBJECTIVE  Epimenio Gift is resting in bed with visitor at bedside. Pain mostly controlled. OBJECTIVE  VITALS:  height is 5' 6\" (1.676 m) and weight is 234 lb 5.6 oz (106.3 kg). Her temperature is 98 °F (36.7 °C). Her blood pressure is 137/78 and her pulse is 66. Her respiration is 18 and oxygen saturation is 100%. INTAKE/OUTPUT:      Intake/Output Summary (Last 24 hours) at 4/14/2022 1716  Last data filed at 4/13/2022 1800  Gross per 24 hour   Intake 1200 ml   Output 950 ml   Net 250 ml     GENERAL: alert, cooperative, no distress  LUNGS: clear to ausculation, without wheezes, rales or rhonci  HEART: normal rate and regular rhythm  ABDOMEN: Soft, appropriately tender, non distended. Ostomy pink, viable. SS drainage in bag. EXTREMITY: no cyanosis, clubbing or edema    I/O last 3 completed shifts:   In: 1200 [I.V.:1200]  Out: 950 [Urine:875; Blood:75]      LABS  Recent Labs     04/13/22  0714 04/13/22  0714 04/14/22  0849 04/14/22  0850   WBC 6.2   < > 9.6  --    HGB 10.3*   < > 9.9*  --    HCT 30.4*   < > 28.6*  --       < > 271  --    NA  --   --   --  139   K  --   --   --  3.7   CL  --   --   --  109   CO2  --   --   --  17*   BUN  --   --   --  10   CREATININE  --   --   --  0.7   CALCIUM  --   --   --  8.2*   INR 1.15*  --   --   --     < > = values in this interval not displayed. EDUCATION  Patient educated about Disease Process, Medications, Smoking Cessation, Oxygenation, Incentive Spirometry and Deep Breath and Cough, Diabetes, Hyperlipidemia, Smoking Cessation, Nutrition, Exercise and Hypertension    Electronically signed by ANIKA Smith CNP on 4/14/2022 at [address] and Vascular Surgery   [phone] Office  [phone] Fax     Agree with above note. The patient was personally seen and examined. Eugenia Sykes is doing well. Pain present but improved from yesterday. Tolerating some regular diet. No ostomy output yet. Denies nausea. Left chest and neck incisions c/d/i, no erythema or induration  Abd soft, ND, appropriately tender, LUQ ostomy pink, incisions c/d/i, no erythema or induration    WBC 9.6  Cr 0.7    Path pending    A/P: 37 yo female with near obstructing metastatic transverse colon cancer s/p laparoscopic transverse colectomy with colostomy, laparoscopic liver biopsy, port insertion POD #1    Doing well. Continue regular diet. Await ostomy function.   Ostomy nurses consulted for assistance in care  Ambulate/OOB  Continue current pain regimen    Stewart Lema MD

## 2022-04-14 NOTE — PLAN OF CARE
Problem: Pain:  Goal: Pain level will decrease  Description: Pain level will decrease  4/13/2022 2354 by Heide Amaya RN  Outcome: Ongoing     Problem: Pain:  Goal: Control of acute pain  Description: Control of acute pain  4/13/2022 2354 by Heide Amaya RN  Outcome: Ongoing     Problem: Pain:  Goal: Control of chronic pain  Description: Control of chronic pain  4/13/2022 2354 by Heide Amaya RN  Outcome: Ongoing     Problem: Safety:  Goal: Free from accidental physical injury  Description: Free from accidental physical injury  4/13/2022 2354 by Heide Amaya RN  Outcome: Ongoing     Problem: Safety:  Goal: Free from intentional harm  Description: Free from intentional harm  4/13/2022 2354 by Heide Amaya RN  Outcome: Ongoing     Problem: Daily Care:  Goal: Daily care needs are met  Description: Daily care needs are met  4/13/2022 2354 by Heide Amaya RN  Outcome: Ongoing     Problem: Discharge Planning:  Goal: Patients continuum of care needs are met  Description: Patients continuum of care needs are met  4/13/2022 2354 by Heide Amaya RN  Outcome: Ongoing     Problem: Nutrition  Goal: Optimal nutrition therapy  4/13/2022 2354 by Heide Amaya RN  Outcome: Ongoing     Problem: Falls - Risk of:  Goal: Will remain free from falls  Description: Will remain free from falls  Outcome: Ongoing     Problem: Falls - Risk of:  Goal: Absence of physical injury  Description: Absence of physical injury  Outcome: Ongoing

## 2022-04-14 NOTE — CARE COORDINATION
Wound care aware of consult for new ostomy. Spoke with patient. Pt appears tired from today's activities. Pt currently in 1-piece pouch with bloody effluent.  Will plan for ostomy teaching session with patient and her daughter tomorrow at 1pm.   Electronically signed by Lori Venegas RN [id number] on 4/14/2022 at 1:41 PM

## 2022-04-14 NOTE — PROGRESS NOTES
Back to room from CT, in pain from moving back an forth from w/c. Will try to sit up in chair later today. Assist to bed, educated on using pillow for support. Dilaudid 0.5 mg IVP given. SCD boots on, call light in reach.

## 2022-04-14 NOTE — PROGRESS NOTES
Low urine output this shift, Vester Party, NP made aware, bladder scanned for 50 ml,  1,000 ml NS bolus initiated.

## 2022-04-14 NOTE — PROGRESS NOTES
[facility]  Oncology Hematology Care  Progress Note      SUBJECTIVE:   PT is sp surgical procedure  Liver biopsy pending I will order caris  Port site clear    OBJECTIVE      Medications    Current Facility-Administered Medications: ciprofloxacin (CIPRO) IVPB 400 mg, 400 mg, IntraVENous, Q12H  lactated ringers infusion, , IntraVENous, Continuous  ketorolac (TORADOL) injection 30 mg, 30 mg, IntraVENous, Q6H  acetaminophen (TYLENOL) tablet 1,000 mg, 1,000 mg, Oral, Q8H  oxyCODONE (ROXICODONE) immediate release tablet 5 mg, 5 mg, Oral, Q4H PRN **OR** oxyCODONE HCl (OXY-IR) immediate release tablet 10 mg, 10 mg, Oral, Q4H PRN  HYDROmorphone (DILAUDID) injection 0.5 mg, 0.5 mg, IntraVENous, Q2H PRN **OR** HYDROmorphone (DILAUDID) injection 1 mg, 1 mg, IntraVENous, Q2H PRN  docusate sodium (COLACE) capsule 100 mg, 100 mg, Oral, BID  pantoprazole (PROTONIX) tablet 40 mg, 40 mg, Oral, QAM AC  promethazine (PHENERGAN) 12.5mg in sodium chloride 0.9% 50 mL IVPB 12.5 mg, 12.5 mg, IntraVENous, Q6H PRN  sodium chloride flush 0.9 % injection 5-40 mL, 5-40 mL, IntraVENous, 2 times per day  sodium chloride flush 0.9 % injection 5-40 mL, 5-40 mL, IntraVENous, PRN  0.9 % sodium chloride infusion, , IntraVENous, PRN  ondansetron (ZOFRAN-ODT) disintegrating tablet 4 mg, 4 mg, Oral, Q8H PRN **OR** ondansetron (ZOFRAN) injection 4 mg, 4 mg, IntraVENous, Q6H PRN  acetaminophen (TYLENOL) tablet 650 mg, 650 mg, Oral, Q6H PRN **OR** acetaminophen (TYLENOL) suppository 650 mg, 650 mg, Rectal, Q6H PRN  metroNIDAZOLE (FLAGYL) tablet 500 mg, 500 mg, Oral, 2 times per day  dicyclomine (BENTYL) capsule 20 mg, 20 mg, Oral, Q6H PRN  doxycycline hyclate (VIBRA-TABS) tablet 100 mg, 100 mg, Oral, 2 times per day  Physical    VITALS:  /78   Pulse 66   Temp 98 °F (36.7 °C)   Resp 18   Ht 5' 6\" (1.676 m)   Wt 234 lb 5.6 oz (106.3 kg)   LMP 04/10/2022   SpO2 100%   BMI 37.82 kg/m²   TEMPERATURE:  Current - Temp: 98 °F (36.7 °C);  Max - Temp  Avg: 96.6 °F (35.9 °C)  Min: 57.9 °F (14.4 °C)  Max: 98.4 °F (36.9 °C)  PULSE OXIMETRY RANGE: SpO2  Av.6 %  Min: 93 %  Max: 100 %  24HR INTAKE/OUTPUT:    Intake/Output Summary (Last 24 hours) at 2022 0859  Last data filed at 2022 1800  Gross per 24 hour   Intake 1200 ml   Output 950 ml   Net 250 ml       CONSTITUTIONAL:  awake, alert, cooperative, no apparent distress, HEENT oral pharynx , no scleral icterus  HEMATOLOGIC/LYMPHATICS:  no cervical lymphadenopathy, no supraclavicular lymphadenopathy,  LUNGS:  No increased work of breathing, good air exchange, clear to auscultation bilaterally, no crackles or wheezing  CARDIOVASCULAR:  , regular rate and rhythm, normal S1 and S2, no S3 or S4, and no murmur noted  ABDOMEN:  Appropriate tender   MUSCULOSKELETAL:  There is no redness, warmth, or swelling of the joints. EXTREMETIES: No clubbing cynosis or edema  NEUROLOGIC:  Awake, alert, oriented to name, place and time. Cranial nerves II-XII are grossly intact. =  SKIN:  no bruising or bleeding      Data      Recent Labs     22  0714   WBC 6.2   HGB 10.3*   HCT 30.4*      MCV 94.9        No results for input(s): NA, K, CL, CO2, PHOS, BUN, CREATININE, CA in the last 72 hours. No results for input(s): AST, ALT, ALB, BILIDIR, BILITOT, ALKPHOS in the last 72 hours. Magnesium:  No results found for: MG    Imaging CT ABDOMEN PELVIS WO CONTRAST Additional Contrast? None    Result Date: 4/10/2022  EXAMINATION: CT OF THE ABDOMEN AND PELVIS WITHOUT CONTRAST 2022 12:39 pm TECHNIQUE: CT of the abdomen and pelvis was performed without the administration of intravenous contrast. Multiplanar reformatted images are provided for review. Dose modulation, iterative reconstruction, and/or weight based adjustment of the mA/kV was utilized to reduce the radiation dose to as low as reasonably achievable.  COMPARISON: 2019 HISTORY: ORDERING SYSTEM PROVIDED HISTORY: generalized abdominal pain TECHNOLOGIST PROVIDED HISTORY: Reason for exam:->generalized abdominal pain Additional Contrast?->None Decision Support Exception - unselect if not a suspected or confirmed emergency medical condition->Emergency Medical Condition (MA) Is the patient pregnant?->No Reason for Exam: PT. C/O GENERALIZED ABD PAIN X 2 WEEKS, WITH SOME BLOOD IN STOOL WITH NAUSEA  AND EMESIS, STATES HAS HAD INCREASED PAIN WITH URINATION Relevant Medical/Surgical History: NO HX OF ABD PROBLEMS, NO HX OF SURGERY TO ABD, NO HX OF CA FINDINGS: Lower Chest: No acute infiltrate at the lung bases. Organs: Multiple hepatic lesions most consistent with metastatic disease. The largest lesion near the diaphragmatic surface of the liver measures 3.9 x 5.6 cm. Gallbladder is partially contracted. No biliary dilatation. Spleen, pancreas and adrenal glands are unremarkable. No evidence of obstructive uropathy. GI/Bowel: There is suggestion of an annular mass in the distal transverse colon concerning for underlying malignancy. No evidence of obstruction or significant inflammatory changes. Scattered colonic diverticula with no acute features. No evidence of appendicitis. No small bowel distension. Stomach and duodenal sweep are unremarkable. Small hiatal hernia. Pelvis: Trace free pelvic fluid, likely physiologic. The uterus and adnexal structures are unremarkable. Multiple calcified pelvic phleboliths. The bladder is contracted. Peritoneum/Retroperitoneum: The abdominal aorta is normal in caliber. No pathologic retroperitoneal adenopathy or upper abdominal ascites. Mesenteric adenopathy adjacent to the lesion in the transverse colon with the largest node measuring 11 x 12 mm. Bones/Soft Tissues: No acute osseous or soft tissue abnormality. Small fat containing umbilical hernia. 1. Multiple hepatic lesions most consistent with metastatic disease.  Findings concerning for an annular mass in the distal transverse colon, likely a colonic primary with adjacent adenopathy. 2. No other acute findings within the abdomen or pelvis. CT ABDOMEN W CONTRAST Additional Contrast? Radiologist Recommendation    Result Date: 4/11/2022  EXAMINATION: CT ABDOMEN WITH CONTRAST 4/11/2022 9:27 am TECHNIQUE: CT of the abdomen was performed with the administration of intravenous contrast. Multiplanar reformatted images are provided for review. Dose modulation, iterative reconstruction, and/or weight based adjustment of the mA/kV was utilized to reduce the radiation dose to as low as reasonably achievable. COMPARISON: 04/09/2022 HISTORY: ORDERING SYSTEM PROVIDED HISTORY: triple phase CT of the liver to assess liver masses TECHNOLOGIST PROVIDED HISTORY: Reason for exam:->triple phase CT of the liver to assess liver masses Additional Contrast?->Radiologist Recommendation Reason for Exam: triple phase CT of the liver to assess liver masses, patient had w/o scan 4/9 FINDINGS: Lower Chest: There is no consolidation or effusion. There is mild circumferential wall thickening involving the distal esophagus just above the GE junction with thickness approaching 8 mm. There is a small 1.3 cm necrotic lymph node within the gastrohepatic ligament. Organs: There are multiple low-attenuation soft tissue masses scattered throughout the liver. The largest is within the left hepatic dome and measures 6 by 4 cm. There is a small 13 mm cystic lesion within the pancreatic tail with Hounsfield units of 16. The remainder of the solid abdominal organs are unremarkable. GI/Bowel: There is no bowel dilatation, wall thickening or obstruction. Peritoneum/Retroperitoneum: There is no free air, free fluid or intraperitoneal inflammatory change. Bones/Soft Tissues: There is no acute fracture or aggressive osseous lesion. 1. Disseminated hepatic metastatic disease. 2. Nonspecific distal esophageal wall thickening. Endoscopy recommended to further evaluate. Neoplasm is a diagnostic consideration. RECOMMENDATIONS: Unavailable     XR CHEST PORTABLE    Result Date: 4/13/2022  EXAM: XR Chest, 1 View EXAM DATE/TIME: 4/13/2022 5:02 pm CLINICAL HISTORY: ORDERING SYSTEM PROVIDED  s/p port insertion  TECHNOLOGIST PROVIDED HISTORY: Reason for exam:->s/p port insertion  Reason for Exam: s/p port insertion TECHNIQUE: Frontal view of the chest. COMPARISON: 12/01/2014 FINDINGS: Lungs:  No acute findings. No consolidation. Pleural space:  No acute findings. No pneumothorax. Heart:  No acute findings. No cardiomegaly. Mediastinum:  No acute findings. Bones/joints:  No acute findings. Tubes, lines and devices:  Left-sided chest port has been placed via the left internal jugular vein with the tip in the lower right atrium. A loop of the catheter extends cephalad in the neck and is not included on this study and therefore abnormalities of the catheter in this area, such as kinking cannot be excluded. 1.  Left-sided chest port has been placed via the left internal jugular vein with the tip in the lower right atrium. A loop of the catheter extends cephalad in the neck and is not included on this study and therefore abnormalities of the catheter in this area, such as kinking cannot be excluded. No evidence of pneumothorax. 2.  Otherwise no acute pulmonary disease or significant change noted. [facility] VASCULAR ACCESS GUIDANCE    Result Date: 4/13/2022  Radiology exam is complete. No Radiologist dictation. Please follow up with ordering provider.      Colonoscopy    Result Date: 4/11/2022  No dictation       Problem List  Patient Active Problem List   Diagnosis    Lower GI bleed    Trichimoniasis    Liver lesion    Colonic mass    Rectal bleeding    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 ([facility] [address]       ASSESSMENT AND PLAN    Stage iv colon ca  We will set up fu to start chemo once she is healed -will start asap   I will check for molecular profiling/etc   Will need outpt genetics  I ordered a chest ct to complete staging   DC is when surgery feels appropriate     Marko Nelson MD, MD

## 2022-04-14 NOTE — PROGRESS NOTES
NS bolus complete. 100 ml urinary output. Poor po intake today due to nausea. Encouraged fluids as tolerated. Family at bedside.

## 2022-04-14 NOTE — PLAN OF CARE
Problem: Pain:  Goal: Pain level will decrease  Description: Pain level will decrease  6/67/8679 1601 by Jacques Hughes RN  Outcome: Ongoing  4/13/2022 2354 by Vangie Marie RN  Outcome: Ongoing  Goal: Control of acute pain  Description: Control of acute pain  7/96/0184 4925 by Jacques Hughes RN  Outcome: Ongoing  4/13/2022 2354 by Vangie Marie RN  Outcome: Ongoing  Goal: Control of chronic pain  Description: Control of chronic pain  2/16/0726 6501 by Jacques Hughes RN  Outcome: Ongoing  4/13/2022 2354 by Vangie Marie RN  Outcome: Ongoing     Problem: Safety:  Goal: Free from accidental physical injury  Description: Free from accidental physical injury  3/12/0720 1419 by Jacques Hughes RN  Outcome: Ongoing  4/13/2022 2354 by Vangie Marie RN  Outcome: Ongoing  Goal: Free from intentional harm  Description: Free from intentional harm  4/53/1453 6621 by Jacques Hughes RN  Outcome: Ongoing  4/13/2022 2354 by Vangie Marie RN  Outcome: Ongoing     Problem: Daily Care:  Goal: Daily care needs are met  Description: Daily care needs are met  1/86/5740 2183 by Jacques Hughes RN  Outcome: Ongoing  4/13/2022 2354 by Vangie Marie RN  Outcome: Ongoing     Problem: Discharge Planning:  Goal: Patients continuum of care needs are met  Description: Patients continuum of care needs are met  0/07/7528 1403 by Jacques Hughes RN  Outcome: Ongoing  4/13/2022 2354 by Vangie Marie RN  Outcome: Ongoing     Problem: Nutrition  Goal: Optimal nutrition therapy  3/68/2404 8395 by Jacques Hughes RN  Outcome: Ongoing  4/13/2022 2354 by Vangie Marie RN  Outcome: Ongoing     Problem: Falls - Risk of:  Goal: Will remain free from falls  Description: Will remain free from falls  5/75/3045 0297 by Jacques Hughes RN  Outcome: Ongoing  4/13/2022 2354 by Vangie Marie RN  Outcome: Ongoing  Goal: Absence of physical injury  Description: Absence of physical injury  4/35/7040 8760 by Jacques Hughes RN  Outcome: Ongoing  4/13/2022 2354 by Denisse Torres Yesica Joshua RN  Outcome: Ongoing

## 2022-04-14 NOTE — PLAN OF CARE
Problem: Nutrition  Goal: Optimal nutrition therapy  4/14/2022 1212 by Pacheco Cárdenas RD, LD  Outcome: Ongoing  3/59/7616 3372 by Cyndi Lange RN  Outcome: Ongoing  4/13/2022 2354 by Chemo Najera RN  Outcome: Ongoing     Nutrition Problem #1: Inadequate oral intake  Intervention: Food and/or Nutrient Delivery: Continue Current Diet,Continue Oral Nutrition Supplement  Nutritional Goals: Diet advancement with intakes >50%

## 2022-04-14 NOTE — PROGRESS NOTES
Comprehensive Nutrition Assessment    Type and Reason for Visit:  Reassess    Nutrition Recommendations/Plan:   Regular diet   Sharan BID  Will monitor nutritional adequacy, nutrition-related labs, weights, BMs, and clinical progress     Nutrition Assessment:  Follow-up. Pt is now s/p POD #1 left IJ PAC, lap transverse colectomy with end colostomy, lap wedge biopsy of liver mass. Pt on Regular diet, did accept some po today at breakfast.  Celesta [place] is ordered BID as well. Pt without BM since surgery. Will continue to monitor post-surgical intakes. Malnutrition Assessment:  Malnutrition Status:  Insufficient data    Context:  Chronic Illness       Estimated Daily Nutrient Needs:  Energy (kcal):  8166-4701 (11-14kcal/102kg); Weight Used for Energy Requirements:  Current     Protein (g):  71-83 (1.2-1.4g/59kg); Weight Used for Protein Requirements:  Ideal        Fluid (ml/day):   ; Method Used for Fluid Requirements:  1 ml/kcal      Nutrition Related Findings:  LBM 4/11      Wounds:  None       Current Nutrition Therapies:    ADULT DIET; Regular  ADULT ORAL NUTRITION SUPPLEMENT; Breakfast, Lunch;  Wound Healing Oral Supplement    Anthropometric Measures:  · Height: 5' 6\" (167.6 cm)  · Current Body Weight: 234 lb (106.1 kg)   · Admission Body Weight: 225 lb (102.1 kg)    · Ideal Body Weight: 130 lbs; % Ideal Body Weight 173.1 %   · BMI: 37.8  · Adjusted Body Weight:  ; No Adjustment   · BMI Categories: Obese Class 2 (BMI 35.0 -39.9)       Nutrition Diagnosis:   · Inadequate oral intake related to inadequate protein-energy intake as evidenced by NPO or clear liquid status due to medical condition      Nutrition Interventions:   Food and/or Nutrient Delivery:  Continue Current Diet,Continue Oral Nutrition Supplement  Nutrition Education/Counseling:  Education not indicated   Coordination of Nutrition Care:  Continue to monitor while inpatient    Goals:  Diet advancement with intakes >50%       Nutrition Monitoring and Evaluation:   Behavioral-Environmental Outcomes:  None Identified   Food/Nutrient Intake Outcomes:  Diet Advancement/Tolerance,Food and Nutrient Intake  Physical Signs/Symptoms Outcomes:  Biochemical Data,GI Status,Nausea or Vomiting,Weight     Discharge Planning:     Too soon to determine     Electronically signed by Noé Moses RD, LD on 4/14/22 at 12:11 PM EDT    Contact: [phone]

## 2022-04-14 NOTE — PROGRESS NOTES
In bed talking on phone, family at bedside. Encouraged patient to get OOB to chair today. Eating breakfast and talking with family about diagnosis. Will give patient time to visit with family and update them. Fresh ice and jeyson crackers given. Denies other needs. Call light in reach.

## 2022-04-15 LAB
ANION GAP SERPL CALCULATED.3IONS-SCNC: 9 MMOL/L (ref 3–16)
BASOPHILS ABSOLUTE: 0 K/UL (ref 0–0.2)
BASOPHILS RELATIVE PERCENT: 0.5 %
BUN BLDV-MCNC: 12 MG/DL (ref 7–20)
CALCIUM SERPL-MCNC: 8.2 MG/DL (ref 8.3–10.6)
CHLORIDE BLD-SCNC: 108 MMOL/L (ref 99–110)
CO2: 20 MMOL/L (ref 21–32)
CREAT SERPL-MCNC: 0.7 MG/DL (ref 0.6–1.1)
EKG ATRIAL RATE: 59 BPM
EKG DIAGNOSIS: NORMAL
EKG P AXIS: 57 DEGREES
EKG P-R INTERVAL: 174 MS
EKG Q-T INTERVAL: 424 MS
EKG QRS DURATION: 94 MS
EKG QTC CALCULATION (BAZETT): 419 MS
EKG R AXIS: 31 DEGREES
EKG T AXIS: 26 DEGREES
EKG VENTRICULAR RATE: 59 BPM
EOSINOPHILS ABSOLUTE: 0.2 K/UL (ref 0–0.6)
EOSINOPHILS RELATIVE PERCENT: 3.1 %
GFR AFRICAN AMERICAN: >60
GFR NON-AFRICAN AMERICAN: >60
GLUCOSE BLD-MCNC: 122 MG/DL (ref 70–99)
GLUCOSE BLD-MCNC: 86 MG/DL (ref 70–99)
HCT VFR BLD CALC: 28.1 % (ref 36–48)
HEMOGLOBIN: 9.4 G/DL (ref 12–16)
LYMPHOCYTES ABSOLUTE: 1.2 K/UL (ref 1–5.1)
LYMPHOCYTES RELATIVE PERCENT: 15.3 %
MCH RBC QN AUTO: 31.8 PG (ref 26–34)
MCHC RBC AUTO-ENTMCNC: 33.2 G/DL (ref 31–36)
MCV RBC AUTO: 95.8 FL (ref 80–100)
MONOCYTES ABSOLUTE: 0.7 K/UL (ref 0–1.3)
MONOCYTES RELATIVE PERCENT: 9.1 %
NEUTROPHILS ABSOLUTE: 5.8 K/UL (ref 1.7–7.7)
NEUTROPHILS RELATIVE PERCENT: 72 %
PDW BLD-RTO: 13.4 % (ref 12.4–15.4)
PERFORMED ON: ABNORMAL
PLATELET # BLD: 257 K/UL (ref 135–450)
PMV BLD AUTO: 7.7 FL (ref 5–10.5)
POTASSIUM REFLEX MAGNESIUM: 3.7 MMOL/L (ref 3.5–5.1)
RBC # BLD: 2.94 M/UL (ref 4–5.2)
SODIUM BLD-SCNC: 137 MMOL/L (ref 136–145)
TROPONIN: <0.01 NG/ML
WBC # BLD: 8.1 K/UL (ref 4–11)

## 2022-04-15 PROCEDURE — 36415 COLL VENOUS BLD VENIPUNCTURE: CPT

## 2022-04-15 PROCEDURE — 6370000000 HC RX 637 (ALT 250 FOR IP): Performed by: SURGERY

## 2022-04-15 PROCEDURE — 99024 POSTOP FOLLOW-UP VISIT: CPT | Performed by: SURGERY

## 2022-04-15 PROCEDURE — 2580000003 HC RX 258: Performed by: SURGERY

## 2022-04-15 PROCEDURE — 80048 BASIC METABOLIC PNL TOTAL CA: CPT

## 2022-04-15 PROCEDURE — 94760 N-INVAS EAR/PLS OXIMETRY 1: CPT

## 2022-04-15 PROCEDURE — 93010 ELECTROCARDIOGRAM REPORT: CPT | Performed by: INTERNAL MEDICINE

## 2022-04-15 PROCEDURE — 1200000000 HC SEMI PRIVATE

## 2022-04-15 PROCEDURE — 84484 ASSAY OF TROPONIN QUANT: CPT

## 2022-04-15 PROCEDURE — 6360000002 HC RX W HCPCS: Performed by: SURGERY

## 2022-04-15 PROCEDURE — 85025 COMPLETE CBC W/AUTO DIFF WBC: CPT

## 2022-04-15 PROCEDURE — 93005 ELECTROCARDIOGRAM TRACING: CPT | Performed by: INTERNAL MEDICINE

## 2022-04-15 RX ADMIN — ACETAMINOPHEN 1000 MG: 325 TABLET ORAL at 12:31

## 2022-04-15 RX ADMIN — KETOROLAC TROMETHAMINE 30 MG: 30 INJECTION, SOLUTION INTRAMUSCULAR at 06:28

## 2022-04-15 RX ADMIN — HYDROMORPHONE HYDROCHLORIDE 1 MG: 1 INJECTION, SOLUTION INTRAMUSCULAR; INTRAVENOUS; SUBCUTANEOUS at 20:23

## 2022-04-15 RX ADMIN — METRONIDAZOLE 500 MG: 500 TABLET ORAL at 09:01

## 2022-04-15 RX ADMIN — ONDANSETRON 4 MG: 2 INJECTION INTRAMUSCULAR; INTRAVENOUS at 09:01

## 2022-04-15 RX ADMIN — SODIUM CHLORIDE, POTASSIUM CHLORIDE, SODIUM LACTATE AND CALCIUM CHLORIDE: 600; 310; 30; 20 INJECTION, SOLUTION INTRAVENOUS at 14:37

## 2022-04-15 RX ADMIN — KETOROLAC TROMETHAMINE 30 MG: 30 INJECTION, SOLUTION INTRAMUSCULAR at 01:48

## 2022-04-15 RX ADMIN — HYDROMORPHONE HYDROCHLORIDE 1 MG: 1 INJECTION, SOLUTION INTRAMUSCULAR; INTRAVENOUS; SUBCUTANEOUS at 12:33

## 2022-04-15 RX ADMIN — KETOROLAC TROMETHAMINE 30 MG: 30 INJECTION, SOLUTION INTRAMUSCULAR at 14:30

## 2022-04-15 RX ADMIN — HYDROMORPHONE HYDROCHLORIDE 1 MG: 1 INJECTION, SOLUTION INTRAMUSCULAR; INTRAVENOUS; SUBCUTANEOUS at 23:54

## 2022-04-15 RX ADMIN — PANTOPRAZOLE SODIUM 40 MG: 40 TABLET, DELAYED RELEASE ORAL at 06:28

## 2022-04-15 RX ADMIN — DOXYCYCLINE HYCLATE 100 MG: 100 TABLET, FILM COATED ORAL at 09:01

## 2022-04-15 RX ADMIN — SODIUM CHLORIDE, POTASSIUM CHLORIDE, SODIUM LACTATE AND CALCIUM CHLORIDE: 600; 310; 30; 20 INJECTION, SOLUTION INTRAVENOUS at 03:41

## 2022-04-15 RX ADMIN — DOCUSATE SODIUM 100 MG: 100 CAPSULE, LIQUID FILLED ORAL at 09:01

## 2022-04-15 ASSESSMENT — PAIN SCALES - GENERAL
PAINLEVEL_OUTOF10: 7
PAINLEVEL_OUTOF10: 4
PAINLEVEL_OUTOF10: 7
PAINLEVEL_OUTOF10: 3
PAINLEVEL_OUTOF10: 0
PAINLEVEL_OUTOF10: 5
PAINLEVEL_OUTOF10: 4

## 2022-04-15 ASSESSMENT — PAIN DESCRIPTION - DESCRIPTORS
DESCRIPTORS: ACHING
DESCRIPTORS: ACHING
DESCRIPTORS: DISCOMFORT

## 2022-04-15 ASSESSMENT — PAIN DESCRIPTION - PAIN TYPE
TYPE: SURGICAL PAIN

## 2022-04-15 ASSESSMENT — PAIN DESCRIPTION - PROGRESSION
CLINICAL_PROGRESSION: NOT CHANGED

## 2022-04-15 ASSESSMENT — PAIN DESCRIPTION - LOCATION
LOCATION: ABDOMEN

## 2022-04-15 NOTE — PROGRESS NOTES
Pt  is  complaining of chest pain that radiates to the back. VS are WNL. stat Ekg and Stat troponin ordered per protocol. Perfect served Dr. Kisha Hare. Waiting for other orders.

## 2022-04-15 NOTE — PLAN OF CARE
Problem: Pain:  Goal: Pain level will decrease  Description: Pain level will decrease  4/15/2022 1005 by Jerson Pandya RN  Outcome: Ongoing  4/15/2022 0208 by Tatiana Jorgensen RN  Outcome: Ongoing  Goal: Control of acute pain  Description: Control of acute pain  4/15/2022 1005 by Jerson Pandya RN  Outcome: Ongoing  4/15/2022 0208 by Tatiana Jorgensen RN  Outcome: Ongoing  Goal: Control of chronic pain  Description: Control of chronic pain  4/15/2022 1005 by Jerson Pandya RN  Outcome: Ongoing  4/15/2022 0208 by Tatiana Jorgensen RN  Outcome: Ongoing     Problem: Safety:  Goal: Free from accidental physical injury  Description: Free from accidental physical injury  4/15/2022 1005 by Jerson Pandya RN  Outcome: Ongoing  4/15/2022 0208 by Tatiana Jorgensen RN  Outcome: Ongoing  Goal: Free from intentional harm  Description: Free from intentional harm  4/15/2022 1005 by Jerson Pandya RN  Outcome: Ongoing  4/15/2022 0208 by Tatiana Jorgensen RN  Outcome: Ongoing     Problem: Daily Care:  Goal: Daily care needs are met  Description: Daily care needs are met  4/15/2022 1005 by Jerson Pandya RN  Outcome: Ongoing  4/15/2022 0208 by Tatiana Jorgensen RN  Outcome: Ongoing     Problem: Discharge Planning:  Goal: Patients continuum of care needs are met  Description: Patients continuum of care needs are met  4/15/2022 1005 by Jerson Pandya RN  Outcome: Ongoing  4/15/2022 0208 by Tatiana Jorgensen RN  Outcome: Ongoing     Problem: Nutrition  Goal: Optimal nutrition therapy  4/15/2022 1005 by Jerson Pandya RN  Outcome: Ongoing  4/15/2022 0208 by Tatiana Jorgensen RN  Outcome: Ongoing     Problem: Falls - Risk of:  Goal: Will remain free from falls  Description: Will remain free from falls  4/15/2022 1005 by Jerson Pandya RN  Outcome: Ongoing  4/15/2022 0208 by Tatiana Jorgensen RN  Outcome: Ongoing  Goal: Absence of physical injury  Description: Absence of physical injury  4/15/2022 1005 by Madhu Campbell RN  Outcome: Ongoing  4/15/2022 0208 by Brock Fink RN  Outcome: Ongoing

## 2022-04-15 NOTE — PROGRESS NOTES
[facility]  [facility]  Progress Note      SUBJECTIVE:   Pt in more pain today   She has some pain with coughing and abd pain     OBJECTIVE      Medications    Current Facility-Administered Medications: lactated ringers infusion, , IntraVENous, Continuous  ketorolac (TORADOL) injection 30 mg, 30 mg, IntraVENous, Q6H  acetaminophen (TYLENOL) tablet 1,000 mg, 1,000 mg, Oral, Q8H  oxyCODONE (ROXICODONE) immediate release tablet 5 mg, 5 mg, Oral, Q4H PRN **OR** oxyCODONE HCl (OXY-IR) immediate release tablet 10 mg, 10 mg, Oral, Q4H PRN  HYDROmorphone (DILAUDID) injection 0.5 mg, 0.5 mg, IntraVENous, Q2H PRN **OR** HYDROmorphone (DILAUDID) injection 1 mg, 1 mg, IntraVENous, Q2H PRN  docusate sodium (COLACE) capsule 100 mg, 100 mg, Oral, BID  pantoprazole (PROTONIX) tablet 40 mg, 40 mg, Oral, QAM AC  promethazine (PHENERGAN) 12.5mg in sodium chloride 0.9% 50 mL IVPB 12.5 mg, 12.5 mg, IntraVENous, Q6H PRN  sodium chloride flush 0.9 % injection 5-40 mL, 5-40 mL, IntraVENous, 2 times per day  sodium chloride flush 0.9 % injection 5-40 mL, 5-40 mL, IntraVENous, PRN  0.9 % sodium chloride infusion, , IntraVENous, PRN  ondansetron (ZOFRAN-ODT) disintegrating tablet 4 mg, 4 mg, Oral, Q8H PRN **OR** ondansetron (ZOFRAN) injection 4 mg, 4 mg, IntraVENous, Q6H PRN  acetaminophen (TYLENOL) tablet 650 mg, 650 mg, Oral, Q6H PRN **OR** acetaminophen (TYLENOL) suppository 650 mg, 650 mg, Rectal, Q6H PRN  metroNIDAZOLE (FLAGYL) tablet 500 mg, 500 mg, Oral, 2 times per day  dicyclomine (BENTYL) capsule 20 mg, 20 mg, Oral, Q6H PRN  doxycycline hyclate (VIBRA-TABS) tablet 100 mg, 100 mg, Oral, 2 times per day  Physical    VITALS:  /66   Pulse 66   Temp 97.8 °F (36.6 °C) (Oral)   Resp 20   Ht 5' 6\" (1.676 m)   Wt 239 lb 3.2 oz (108.5 kg)   LMP 04/10/2022   SpO2 95%   BMI 38.61 kg/m²   TEMPERATURE:  Current - Temp: 97.8 °F (36.6 °C);  Max - Temp  Av.3 °F (36.8 °C)  Min: 97.7 °F (36.5 °C)  Max: 99.7 °F (37.6 °C)  PULSE OXIMETRY RANGE: SpO2  Av.2 %  Min: 95 %  Max: 100 %  24HR INTAKE/OUTPUT:      Intake/Output Summary (Last 24 hours) at 4/15/2022 0745  Last data filed at 4/15/2022 0419  Gross per 24 hour   Intake 480 ml   Output 575 ml   Net -95 ml       CONSTITUTIONAL:  awake, alert, cooperative, no apparent distress, HEENT oral pharynx , no scleral icterus  HEMATOLOGIC/LYMPHATICS:  no cervical lymphadenopathy, no supraclavicular lymphadenopathy,  LUNGS:  No increased work of breathing, good air exchange, clear to auscultation bilaterally, no crackles or wheezing  CARDIOVASCULAR:  , regular rate and rhythm, normal S1 and S2, no S3 or S4, and no murmur noted  ABDOMEN:  Appropriate tender   MUSCULOSKELETAL:  There is no redness, warmth, or swelling of the joints. EXTREMETIES: No clubbing cynosis or edema  NEUROLOGIC:  Awake, alert, oriented to name, place and time. Cranial nerves II-XII are grossly intact. =  SKIN:  no bruising or bleeding      Data      Recent Labs     22  0714 22  0849 04/15/22  0713   WBC 6.2 9.6 8.1   HGB 10.3* 9.9* 9.4*   HCT 30.4* 28.6* 28.1*    271 257   MCV 94.9 95.6 95.8        Recent Labs     22  0850      K 3.7      CO2 17*   BUN 10   CREATININE 0.7     No results for input(s): AST, ALT, ALB, BILIDIR, BILITOT, ALKPHOS in the last 72 hours. Magnesium:  No results found for: MG    Imaging CT ABDOMEN PELVIS WO CONTRAST Additional Contrast? None    Result Date: 4/10/2022  EXAMINATION: CT OF THE ABDOMEN AND PELVIS WITHOUT CONTRAST 2022 12:39 pm TECHNIQUE: CT of the abdomen and pelvis was performed without the administration of intravenous contrast. Multiplanar reformatted images are provided for review. Dose modulation, iterative reconstruction, and/or weight based adjustment of the mA/kV was utilized to reduce the radiation dose to as low as reasonably achievable.  COMPARISON: 2019 HISTORY: ORDERING SYSTEM PROVIDED HISTORY: generalized abdominal pain TECHNOLOGIST PROVIDED HISTORY: Reason for exam:->generalized abdominal pain Additional Contrast?->None Decision Support Exception - unselect if not a suspected or confirmed emergency medical condition->Emergency Medical Condition (MA) Is the patient pregnant?->No Reason for Exam: PT. C/O GENERALIZED ABD PAIN X 2 WEEKS, WITH SOME BLOOD IN STOOL WITH NAUSEA  AND EMESIS, STATES HAS HAD INCREASED PAIN WITH URINATION Relevant Medical/Surgical History: NO HX OF ABD PROBLEMS, NO HX OF SURGERY TO ABD, NO HX OF CA FINDINGS: Lower Chest: No acute infiltrate at the lung bases. Organs: Multiple hepatic lesions most consistent with metastatic disease. The largest lesion near the diaphragmatic surface of the liver measures 3.9 x 5.6 cm. Gallbladder is partially contracted. No biliary dilatation. Spleen, pancreas and adrenal glands are unremarkable. No evidence of obstructive uropathy. GI/Bowel: There is suggestion of an annular mass in the distal transverse colon concerning for underlying malignancy. No evidence of obstruction or significant inflammatory changes. Scattered colonic diverticula with no acute features. No evidence of appendicitis. No small bowel distension. Stomach and duodenal sweep are unremarkable. Small hiatal hernia. Pelvis: Trace free pelvic fluid, likely physiologic. The uterus and adnexal structures are unremarkable. Multiple calcified pelvic phleboliths. The bladder is contracted. Peritoneum/Retroperitoneum: The abdominal aorta is normal in caliber. No pathologic retroperitoneal adenopathy or upper abdominal ascites. Mesenteric adenopathy adjacent to the lesion in the transverse colon with the largest node measuring 11 x 12 mm. Bones/Soft Tissues: No acute osseous or soft tissue abnormality. Small fat containing umbilical hernia. 1. Multiple hepatic lesions most consistent with metastatic disease.  Findings concerning for an annular mass in the distal transverse colon, likely a colonic primary with adjacent adenopathy. 2. No other acute findings within the abdomen or pelvis. CT ABDOMEN W CONTRAST Additional Contrast? Radiologist Recommendation    Result Date: 4/11/2022  EXAMINATION: CT ABDOMEN WITH CONTRAST 4/11/2022 9:27 am TECHNIQUE: CT of the abdomen was performed with the administration of intravenous contrast. Multiplanar reformatted images are provided for review. Dose modulation, iterative reconstruction, and/or weight based adjustment of the mA/kV was utilized to reduce the radiation dose to as low as reasonably achievable. COMPARISON: 04/09/2022 HISTORY: ORDERING SYSTEM PROVIDED HISTORY: triple phase CT of the liver to assess liver masses TECHNOLOGIST PROVIDED HISTORY: Reason for exam:->triple phase CT of the liver to assess liver masses Additional Contrast?->Radiologist Recommendation Reason for Exam: triple phase CT of the liver to assess liver masses, patient had w/o scan 4/9 FINDINGS: Lower Chest: There is no consolidation or effusion. There is mild circumferential wall thickening involving the distal esophagus just above the GE junction with thickness approaching 8 mm. There is a small 1.3 cm necrotic lymph node within the gastrohepatic ligament. Organs: There are multiple low-attenuation soft tissue masses scattered throughout the liver. The largest is within the left hepatic dome and measures 6 by 4 cm. There is a small 13 mm cystic lesion within the pancreatic tail with Hounsfield units of 16. The remainder of the solid abdominal organs are unremarkable. GI/Bowel: There is no bowel dilatation, wall thickening or obstruction. Peritoneum/Retroperitoneum: There is no free air, free fluid or intraperitoneal inflammatory change. Bones/Soft Tissues: There is no acute fracture or aggressive osseous lesion. 1. Disseminated hepatic metastatic disease. 2. Nonspecific distal esophageal wall thickening. Endoscopy recommended to further evaluate.   Neoplasm is a diagnostic consideration. RECOMMENDATIONS: Unavailable     XR CHEST PORTABLE    Result Date: 4/13/2022  EXAM: XR Chest, 1 View EXAM DATE/TIME: 4/13/2022 5:02 pm CLINICAL HISTORY: ORDERING SYSTEM PROVIDED  s/p port insertion  TECHNOLOGIST PROVIDED HISTORY: Reason for exam:->s/p port insertion  Reason for Exam: s/p port insertion TECHNIQUE: Frontal view of the chest. COMPARISON: 12/01/2014 FINDINGS: Lungs:  No acute findings. No consolidation. Pleural space:  No acute findings. No pneumothorax. Heart:  No acute findings. No cardiomegaly. Mediastinum:  No acute findings. Bones/joints:  No acute findings. Tubes, lines and devices:  Left-sided chest port has been placed via the left internal jugular vein with the tip in the lower right atrium. A loop of the catheter extends cephalad in the neck and is not included on this study and therefore abnormalities of the catheter in this area, such as kinking cannot be excluded. 1.  Left-sided chest port has been placed via the left internal jugular vein with the tip in the lower right atrium. A loop of the catheter extends cephalad in the neck and is not included on this study and therefore abnormalities of the catheter in this area, such as kinking cannot be excluded. No evidence of pneumothorax. 2.  Otherwise no acute pulmonary disease or significant change noted. [place] VASCULAR ACCESS GUIDANCE    Result Date: 4/13/2022  Radiology exam is complete. No Radiologist dictation. Please follow up with ordering provider.      Colonoscopy    Result Date: 4/11/2022  No dictation       Problem List  Patient Active Problem List   Diagnosis    Lower GI bleed    Trichimoniasis    Liver lesion    Colonic mass    Rectal bleeding    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 [facility])       ASSESSMENT AND PLAN    Stage iv colon ca  She has an outpt appt   If she leaves soon will move it up -  caris ordered   Will need outpt genetics  I ordered a chest ct to complete staging -no mets   overweekend we will only see if needed as really our role will be more outpt at this brionna Chow MD, MD

## 2022-04-15 NOTE — PROGRESS NOTES
Pt unable to void 6 hours after escalona removal, bladder scanned for only 139 ml. Paged Dr. Theodora Cleary, stated to wait and recheck in a few hours. Straight cath if over 500 ml.

## 2022-04-15 NOTE — CARE COORDINATION
Discharge Planning:    Patient s/p lap transverse colectomy with end colostomy. Patient agreeable to home care for skilled nursing. Referrals placed to:    [facility]  [facility]-unable to staff  [facility]-left message    Awaiting response. Will continue to monitor. Electronically signed by Sarah Morrison RN on 4/15/2022 at 2:34 PM    [facility] is unable to staff at this time. Left messages for companies listed above. Will continue to monitor.     Electronically signed by Sarah Morrison RN on 4/15/2022 at 2:53 PM

## 2022-04-15 NOTE — CARE COORDINATION
Patient requested formal letter stating her diagnosis. Her son is incarcerated and needs this to be able to get out to visit her. Message sent to Dr. Danielle Burden to write note. CM to pull from document list (or possibly soft chart) once completed to fax to facility, per patient's direction.     Electronically signed by Jorge A Marlow RN on 4/15/2022 at 2:45 PM

## 2022-04-15 NOTE — CARE COORDINATION
Ostomy Referral Progress Note      NAME:  Caden Stafford  MEDICAL RECORD NUMBER:  [id number]  AGE: 36 y.o. GENDER:  female  :  1980  TODAY'S DATE:  4/15/2022    Subjective   Metastatic near obstructing transverse colon cancer  -22 left IJ PAC, lap transverse colectomy with end colostomy, lap wedge biopsy of liver mass      Caden Stafford is a 36 y.o. female referred by:   [x] Physician  [] Nursing  [] Other:     New    Surgeon Dr. Luis Ross:        Diagnosis Date    Asthma     Colon cancer [facility])        MEDICATIONS:    No current facility-administered medications on file prior to encounter. No current outpatient medications on file prior to encounter. ALLERGIES:    Allergies   Allergen Reactions    Codeine Hives    Pcn [Penicillins] Hives       PAST SURGICAL HISTORY:    Past Surgical History:   Procedure Laterality Date    COLONOSCOPY N/A 2022    COLONOSCOPY WITH BIOPSY performed by Courtney Wang MD at [address]  2022    COLONOSCOPY SUBMUCOSAL tattoo  INJECTION performed by Courtney Wang MD at [address] 2022    LAPAROSCOPIC TRANSVERSE COLECTOMY WITH COLOSTOMY, INSERTION OF PORT A CATHETER, LAPAROSCOPIC LIVER BIOPSY performed by Alicia Murry MD at [address] [place] [address] Ne:    family history is not on file.     SOCIAL HISTORY:    Social History     Tobacco Use    Smoking status: Never Smoker    Smokeless tobacco: Never Used   Substance Use Topics    Alcohol use: Yes     Comment: socially    Drug use: No       LABS:  WBC:    Lab Results   Component Value Date    WBC 8.1 04/15/2022     H/H:    Lab Results   Component Value Date    HGB 9.4 04/15/2022    HCT 28.1 04/15/2022     BMP:    Lab Results   Component Value Date     04/15/2022    K 3.7 04/15/2022     04/15/2022    CO2 20 04/15/2022    BUN 12 04/15/2022    LABALBU 3.9 04/09/2022    CREATININE 0.7 04/15/2022    CALCIUM 8.2 04/15/2022    GFRAA >60 04/15/2022    GFRAA >60 06/06/2013    LABGLOM >60 04/15/2022    GLUCOSE 86 04/15/2022     PTT:    Lab Results   Component Value Date    APTT 36.1 04/13/2022   [APTT}  PT/INR:    Lab Results   Component Value Date    PROTIME 13.1 04/13/2022    INR 1.15 04/13/2022       Objective    /88   Pulse 66   Temp 98.6 °F (37 °C) (Oral)   Resp 18   Ht 5' 6\" (1.676 m)   Wt 239 lb 3.2 oz (108.5 kg)   LMP 04/10/2022   SpO2 100%   BMI 38.61 kg/m²     Derrick Risk Score Derrick Scale Score: 19    Patient Active Problem List   Diagnosis Code    Lower GI bleed K92.2    Trichimoniasis A59.9    Liver lesion K76.9    Colonic mass K63.89    Rectal bleeding K62.5    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 (HCC) C18.9, C78.7       Assessment     Colostomy LUQ Transverse (Active)   Stomal Appliance 2 piece; Changed 04/15/22 1350   Stoma  Assessment Moist;Pink;Flush 04/15/22 1350   Mucocutaneous Junction Intact 04/15/22 1350   Peristomal Assessment Clean; Intact 04/15/22 1350   Treatment Bag change;Site care 04/15/22 1350   Stool Appearance Loose 04/15/22 1350   Stool Color Brown 04/15/22 1350   Stool Amount Large 04/15/22 1350   Output (mL) 100 ml 04/15/22 1350   Number of days: 1     Pt awake and alert in bed with daughter at bedside for ostomy teaching session. Pt has loose brown stool in current pouch. Current pouch removed. -Pt stoma pink moist, slightly flush. Site care provided. Peristomal skin intact. ABD incisions closed with glue intact.  -Pt placed in 2-piece convex pouch, cut to fit. Good seal obtained. Pt educated on how to empty and close pouch. How to cut wafer and place pouch. Demonstrated and educated patient and her daughter. Denied questions. Educational material and additional supplies left in room. Pt tearful during teaching session. Emotional support given.        Intake/Output Summary (Last 24 hours) at 4/15/2022 1253  Last data filed at 4/15/2022 1017  Gross per 24 hour   Intake 360 ml   Output 650 ml   Net -290 ml         Plan   Plan for Ostomy Care:  SenSura Daniel Flex convex #[id number] (5/8-2-1/16\" YELLOW)  Sensura Daniel Flex Drainable Pouch #[id number] YELLOW  Brava protective Seal thin [id number]  Routine ostomy care -   Change pouch every 3 days & PRN. Empty when 1/3 to 1/2 full of stool or gas. Will continue to follow for ostomy teaching. Will plan next teaching session Monday at [facility]  [x] Supplies/Instructions left in room  [] Patient using home supplies  [x] Brand/supplies at bedside coloplast  [] Current pouching system     Current Diet: ADULT DIET; Regular  ADULT ORAL NUTRITION SUPPLEMENT; Breakfast, Lunch;  Wound Healing Oral Supplement  Dietician consult:  Yes    Discharge Plan:  Placement for patient upon discharge: home with support    Outpatient visit plan Yes  Supplies given N/A   Samples requested N/A    Referrals:  []   [] [facility]  [] Supplies  [] Other      Patient/Caregiver Teaching:  Written Instructions given to patient/family  Teaching provided:  [x] Reviewed GI and A&P        [x] Supplies  [x] Pouch emptying      [] Manipulate closure  [x] Routine Care         [] Comment  [x] Pouch maintenance           Level of patient/caregiver understanding able to:  [x] Indicates understanding       [] Needs reinforcement  [] Unsuccessful      [x] Verbal Understanding  [] Demonstrated understanding       [] No evidence of learning  [] Refused teaching         [] N/A    Electronically signed by Tom Woodson RN, CWOCN on 4/15/2022 at 12:53 PM

## 2022-04-15 NOTE — PROGRESS NOTES
Hospitalist Progress Note      PCP: No primary care provider on file. Date of Admission: 4/9/2022    Chief Complaint: abdominal pain, and bleeding per rectum. Hospital Course:   41-year-old with a past medical history of asthma, presented to emergency with generalized abdominal pain for the past 4 to 6 weeks as well as bleeding per rectum, patient also reported dysuria. Patient was found to have stage IV colon cancer, s/p transverse colectomy with end colostomy 4/13. Subjective:   Patient continues to report abdominal discomfort and poor appetite. Vital stable      Medications:  Reviewed    Infusion Medications    lactated ringers 100 mL/hr at 04/15/22 0341    sodium chloride       Scheduled Medications    ketorolac  30 mg IntraVENous Q6H    acetaminophen  1,000 mg Oral Q8H    docusate sodium  100 mg Oral BID    pantoprazole  40 mg Oral QAM AC    sodium chloride flush  5-40 mL IntraVENous 2 times per day    metroNIDAZOLE  500 mg Oral 2 times per day    doxycycline hyclate  100 mg Oral 2 times per day     PRN Meds: oxyCODONE **OR** oxyCODONE, HYDROmorphone **OR** HYDROmorphone, promethazine, sodium chloride flush, sodium chloride, ondansetron **OR** ondansetron, acetaminophen **OR** acetaminophen, dicyclomine      Intake/Output Summary (Last 24 hours) at 4/15/2022 0832  Last data filed at 4/15/2022 0653  Gross per 24 hour   Intake 480 ml   Output 675 ml   Net -195 ml       Physical Exam Performed:    /66   Pulse 66   Temp 97.8 °F (36.6 °C) (Oral)   Resp 20   Ht 5' 6\" (1.676 m)   Wt 239 lb 3.2 oz (108.5 kg)   LMP 04/10/2022   SpO2 95%   BMI 38.61 kg/m²     General appearance: No apparent distress, appears stated age and cooperative. HEENT: Pupils equal, round, and reactive to light. Conjunctivae/corneas clear. Neck: Supple, with full range of motion. No jugular venous distention. Trachea midline. Respiratory:  Normal respiratory effort.  Clear to auscultation, bilaterally without Rales/Wheezes/Rhonchi. Cardiovascular: Regular rate and rhythm with normal S1/S2 without murmurs, rubs or gallops. Abdomen: Functioning colostomy, abdominal tenderness noted on the left lower zone. Musculoskeletal: No clubbing, cyanosis or edema bilaterally. Full range of motion without deformity. Skin: Skin color, texture, turgor normal.  No rashes or lesions. Neurologic:  Neurovascularly intact without any focal sensory/motor deficits. Cranial nerves: II-XII intact, grossly non-focal.  Psychiatric: Alert and oriented, thought content appropriate, normal insight  Capillary Refill: Brisk,3 seconds, normal   Peripheral Pulses: +2 palpable, equal bilaterally       Labs:   Recent Labs     04/13/22  0714 04/14/22  0849 04/15/22  0713   WBC 6.2 9.6 8.1   HGB 10.3* 9.9* 9.4*   HCT 30.4* 28.6* 28.1*    271 257     Recent Labs     04/14/22  0850 04/15/22  0713    137   K 3.7 3.7    108   CO2 17* 20*   BUN 10 12   CREATININE 0.7 0.7   CALCIUM 8.2* 8.2*     No results for input(s): AST, ALT, BILIDIR, BILITOT, ALKPHOS in the last 72 hours. Recent Labs     04/13/22  0714   INR 1.15*     Recent Labs     04/15/22  0713   TROPONINI <0.01       Urinalysis:      Lab Results   Component Value Date    NITRU Negative 04/09/2022    WBCUA 21-50 04/09/2022    BACTERIA 2+ 04/09/2022    RBCUA 3-4 04/09/2022    BLOODU SMALL 04/09/2022    SPECGRAV 1.020 04/09/2022    GLUCOSEU Negative 04/09/2022       Radiology:  CT CHEST WO CONTRAST   Final Result   1. Trace bilateral pleural effusions. 2. Unchanged diffuse hepatic metastases. XR CHEST PORTABLE   Final Result      1. Left-sided chest port has been placed via the left internal jugular vein   with the tip in the lower right atrium. A loop of the catheter extends   cephalad in the neck and is not included on this study and therefore   abnormalities of the catheter in this area, such as kinking cannot be   excluded. No evidence of pneumothorax.

## 2022-04-15 NOTE — PROGRESS NOTES
[facility] General Surgery                                   Daily Progress Note                                                         Pt Name: Mayuri Arevalo  Medical Record Number: [id number]  Date of Birth 1980   Today's Date: 4/15/2022    ASSESSMENT/PLAN  Metastatic near obstructing transverse colon cancer  -4/13/22 left IJ PAC, lap transverse colectomy with end colostomy, lap wedge biopsy of liver mass  -follow path  -ostomy beginning to produce. Continue ostomy teaching  -diet as able. Poor appetite  -D/C escalona      SUBJECTIVE  Chitra is resting in bed with visitor at bedside. Pain mostly controlled. Taking in pierce but not much else        OBJECTIVE  VITALS:  height is 5' 6\" (1.676 m) and weight is 239 lb 3.2 oz (108.5 kg). Her oral temperature is 98.6 °F (37 °C). Her blood pressure is 139/88 and her pulse is 66. Her respiration is 18 and oxygen saturation is 100%. INTAKE/OUTPUT:      Intake/Output Summary (Last 24 hours) at 4/15/2022 1015  Last data filed at 4/15/2022 3768  Gross per 24 hour   Intake 600 ml   Output 775 ml   Net -175 ml     GENERAL: alert, cooperative, no distress  LUNGS: clear to ausculation, without wheezes, rales or rhonci  HEART: normal rate and regular rhythm  ABDOMEN: Soft, appropriately tender, non distended. Ostomy pink, viable. SS drainage in bag. EXTREMITY: no cyanosis, clubbing or edema    I/O last 3 completed shifts: In: 480 [P.O.:480]  Out: 675 [Urine:575; Emesis/NG output:100]      LABS  Recent Labs     04/13/22  0714 04/14/22  0849 04/15/22  0713   WBC 6.2   < > 8.1   HGB 10.3*   < > 9.4*   HCT 30.4*   < > 28.1*      < > 257   NA  --    < > 137   K  --    < > 3.7   CL  --    < > 108   CO2  --    < > 20*   BUN  --    < > 12   CREATININE  --    < > 0.7   CALCIUM  --    < > 8.2*   INR 1.15*  --   --     < > = values in this interval not displayed.        EDUCATION  Patient educated about Disease

## 2022-04-15 NOTE — PLAN OF CARE
Problem: Pain:  Goal: Pain level will decrease  Description: Pain level will decrease  Outcome: Ongoing  Goal: Control of acute pain  Description: Control of acute pain  Outcome: Ongoing  Goal: Control of chronic pain  Description: Control of chronic pain  Outcome: Ongoing   Pain/discomfort being managed with PRN analgesics per MD orders. Pt able to express presence and absence of pain and rate pain appropriately using numerical scale.    Problem: Safety:  Goal: Free from accidental physical injury  Description: Free from accidental physical injury  Outcome: Ongoing  Goal: Free from intentional harm  Description: Free from intentional harm  Outcome: Ongoing     Problem: Daily Care:  Goal: Daily care needs are met  Description: Daily care needs are met  Outcome: Ongoing     Problem: Discharge Planning:  Goal: Patients continuum of care needs are met  Description: Patients continuum of care needs are met  Outcome: Ongoing     Problem: Nutrition  Goal: Optimal nutrition therapy  4/15/2022 0208 by Jessee Chris RN  Outcome: Ongoing  4/14/2022 1212 by Steffi Sanchez RD, LD  Outcome: Ongoing     Problem: Falls - Risk of:  Goal: Will remain free from falls  Description: Will remain free from falls  Outcome: Ongoing  Goal: Absence of physical injury  Description: Absence of physical injury  Outcome: Ongoing

## 2022-04-16 LAB
ANION GAP SERPL CALCULATED.3IONS-SCNC: 11 MMOL/L (ref 3–16)
BASOPHILS ABSOLUTE: 0 K/UL (ref 0–0.2)
BASOPHILS RELATIVE PERCENT: 0.5 %
BUN BLDV-MCNC: 12 MG/DL (ref 7–20)
CALCIUM SERPL-MCNC: 8.1 MG/DL (ref 8.3–10.6)
CHLORIDE BLD-SCNC: 105 MMOL/L (ref 99–110)
CO2: 21 MMOL/L (ref 21–32)
CREAT SERPL-MCNC: 0.6 MG/DL (ref 0.6–1.1)
EOSINOPHILS ABSOLUTE: 0.4 K/UL (ref 0–0.6)
EOSINOPHILS RELATIVE PERCENT: 4.7 %
GFR AFRICAN AMERICAN: >60
GFR NON-AFRICAN AMERICAN: >60
GLUCOSE BLD-MCNC: 90 MG/DL (ref 70–99)
HCT VFR BLD CALC: 26.7 % (ref 36–48)
HEMOGLOBIN: 9.1 G/DL (ref 12–16)
LYMPHOCYTES ABSOLUTE: 1 K/UL (ref 1–5.1)
LYMPHOCYTES RELATIVE PERCENT: 12.5 %
MCH RBC QN AUTO: 32.5 PG (ref 26–34)
MCHC RBC AUTO-ENTMCNC: 34.1 G/DL (ref 31–36)
MCV RBC AUTO: 95.4 FL (ref 80–100)
MONOCYTES ABSOLUTE: 0.7 K/UL (ref 0–1.3)
MONOCYTES RELATIVE PERCENT: 9.3 %
NEUTROPHILS ABSOLUTE: 5.8 K/UL (ref 1.7–7.7)
NEUTROPHILS RELATIVE PERCENT: 73 %
PDW BLD-RTO: 13.5 % (ref 12.4–15.4)
PLATELET # BLD: 248 K/UL (ref 135–450)
PMV BLD AUTO: 7.9 FL (ref 5–10.5)
POTASSIUM REFLEX MAGNESIUM: 3.6 MMOL/L (ref 3.5–5.1)
RBC # BLD: 2.8 M/UL (ref 4–5.2)
SODIUM BLD-SCNC: 137 MMOL/L (ref 136–145)
WBC # BLD: 7.9 K/UL (ref 4–11)

## 2022-04-16 PROCEDURE — 6370000000 HC RX 637 (ALT 250 FOR IP): Performed by: SURGERY

## 2022-04-16 PROCEDURE — 6360000002 HC RX W HCPCS: Performed by: SURGERY

## 2022-04-16 PROCEDURE — 1200000000 HC SEMI PRIVATE

## 2022-04-16 PROCEDURE — 94760 N-INVAS EAR/PLS OXIMETRY 1: CPT

## 2022-04-16 PROCEDURE — 2580000003 HC RX 258: Performed by: SURGERY

## 2022-04-16 PROCEDURE — 85025 COMPLETE CBC W/AUTO DIFF WBC: CPT

## 2022-04-16 PROCEDURE — 80048 BASIC METABOLIC PNL TOTAL CA: CPT

## 2022-04-16 PROCEDURE — 36415 COLL VENOUS BLD VENIPUNCTURE: CPT

## 2022-04-16 PROCEDURE — 99024 POSTOP FOLLOW-UP VISIT: CPT | Performed by: SURGERY

## 2022-04-16 RX ORDER — POLYETHYLENE GLYCOL 3350 17 G/17G
17 POWDER, FOR SOLUTION ORAL DAILY
Status: DISCONTINUED | OUTPATIENT
Start: 2022-04-16 | End: 2022-04-19 | Stop reason: HOSPADM

## 2022-04-16 RX ADMIN — DOXYCYCLINE HYCLATE 100 MG: 100 TABLET, FILM COATED ORAL at 20:09

## 2022-04-16 RX ADMIN — HYDROMORPHONE HYDROCHLORIDE 1 MG: 1 INJECTION, SOLUTION INTRAMUSCULAR; INTRAVENOUS; SUBCUTANEOUS at 18:29

## 2022-04-16 RX ADMIN — HYDROMORPHONE HYDROCHLORIDE 1 MG: 1 INJECTION, SOLUTION INTRAMUSCULAR; INTRAVENOUS; SUBCUTANEOUS at 13:04

## 2022-04-16 RX ADMIN — HYDROMORPHONE HYDROCHLORIDE 1 MG: 1 INJECTION, SOLUTION INTRAMUSCULAR; INTRAVENOUS; SUBCUTANEOUS at 08:37

## 2022-04-16 RX ADMIN — Medication 12.5 MG: at 13:28

## 2022-04-16 RX ADMIN — HYDROMORPHONE HYDROCHLORIDE 1 MG: 1 INJECTION, SOLUTION INTRAMUSCULAR; INTRAVENOUS; SUBCUTANEOUS at 04:41

## 2022-04-16 RX ADMIN — DOCUSATE SODIUM 100 MG: 100 CAPSULE, LIQUID FILLED ORAL at 20:09

## 2022-04-16 RX ADMIN — ONDANSETRON 4 MG: 4 TABLET, ORALLY DISINTEGRATING ORAL at 10:01

## 2022-04-16 RX ADMIN — ONDANSETRON 4 MG: 2 INJECTION INTRAMUSCULAR; INTRAVENOUS at 04:47

## 2022-04-16 RX ADMIN — ONDANSETRON 4 MG: 2 INJECTION INTRAMUSCULAR; INTRAVENOUS at 18:29

## 2022-04-16 RX ADMIN — ACETAMINOPHEN 1000 MG: 325 TABLET ORAL at 20:09

## 2022-04-16 ASSESSMENT — PAIN DESCRIPTION - PROGRESSION
CLINICAL_PROGRESSION: NOT CHANGED
CLINICAL_PROGRESSION: NOT CHANGED

## 2022-04-16 ASSESSMENT — PAIN SCALES - GENERAL
PAINLEVEL_OUTOF10: 6
PAINLEVEL_OUTOF10: 4
PAINLEVEL_OUTOF10: 10
PAINLEVEL_OUTOF10: 0
PAINLEVEL_OUTOF10: 7
PAINLEVEL_OUTOF10: 0
PAINLEVEL_OUTOF10: 10
PAINLEVEL_OUTOF10: 10

## 2022-04-16 ASSESSMENT — PAIN - FUNCTIONAL ASSESSMENT
PAIN_FUNCTIONAL_ASSESSMENT: PREVENTS OR INTERFERES WITH MANY ACTIVE NOT PASSIVE ACTIVITIES
PAIN_FUNCTIONAL_ASSESSMENT: PREVENTS OR INTERFERES SOME ACTIVE ACTIVITIES AND ADLS

## 2022-04-16 ASSESSMENT — PAIN DESCRIPTION - LOCATION
LOCATION: ABDOMEN

## 2022-04-16 ASSESSMENT — PAIN DESCRIPTION - ORIENTATION
ORIENTATION: LEFT;LOWER

## 2022-04-16 ASSESSMENT — PAIN DESCRIPTION - ONSET
ONSET: ON-GOING

## 2022-04-16 ASSESSMENT — PAIN DESCRIPTION - PAIN TYPE
TYPE: SURGICAL PAIN

## 2022-04-16 ASSESSMENT — PAIN DESCRIPTION - FREQUENCY
FREQUENCY: CONTINUOUS

## 2022-04-16 ASSESSMENT — PAIN DESCRIPTION - DESCRIPTORS
DESCRIPTORS: CONSTANT;CRUSHING
DESCRIPTORS: CONSTANT
DESCRIPTORS: CONSTANT;DISCOMFORT

## 2022-04-16 NOTE — PROGRESS NOTES
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 Ostomy teaching again Monday  -diet as able. Still not taking much PO  -add daily miralax with history chronic constipation        SUBJECTIVE  Chitra is resting in bed with visitor at bedside. Pain mostly controlled. Taking in some PO but c/o nasuea        OBJECTIVE  VITALS:  height is 5' 6\" (1.676 m) and weight is 228 lb 9.9 oz (103.7 kg). Her temperature is 97.9 °F (36.6 °C). Her blood pressure is 121/78 and her pulse is 69. Her respiration is 16 and oxygen saturation is 100%. INTAKE/OUTPUT:      Intake/Output Summary (Last 24 hours) at 4/16/2022 0933  Last data filed at 4/16/2022 0443  Gross per 24 hour   Intake 360 ml   Output 1050 ml   Net -690 ml     GENERAL: alert, cooperative, no distress  LUNGS: clear to ausculation, without wheezes, rales or rhonci  HEART: normal rate and regular rhythm  ABDOMEN: Soft, appropriately tender, non distended. Ostomy pink, viable. SS drainage in bag. EXTREMITY: no cyanosis, clubbing or edema    I/O last 3 completed shifts:   In: 480 [P.O.:480]  Out: 1450 [Urine:1100; Stool:350]      LABS  Recent Labs     04/16/22  0615   WBC 7.9   HGB 9.1*   HCT 26.7*         K 3.6      CO2 21   BUN 12   CREATININE 0.6   CALCIUM 8.1*       EDUCATION  Patient educated about Disease Process, Medications, Smoking Cessation, Oxygenation, Incentive Spirometry and Deep Breath and Cough, Diabetes, Hyperlipidemia, Smoking Cessation, Nutrition, Exercise and Hypertension    Electronically signed by Susan Rose MD on 4/16/2022 at 9:33 AM      [facility]   [phone] Office  [phone] Fax

## 2022-04-16 NOTE — PLAN OF CARE
Problem: Pain:  Goal: Pain level will decrease  Description: Pain level will decrease  Outcome: Ongoing  Goal: Control of acute pain  Description: Control of acute pain  Outcome: Ongoing     Problem: Safety:  Goal: Free from accidental physical injury  Description: Free from accidental physical injury  Outcome: Ongoing     Problem: Daily Care:  Goal: Daily care needs are met  Description: Daily care needs are met  Outcome: Ongoing     Problem: Discharge Planning:  Goal: Patients continuum of care needs are met  Description: Patients continuum of care needs are met  Outcome: Ongoing

## 2022-04-16 NOTE — CARE COORDINATION
ANGY placed phone call to [facility] on behalf of patient. Her son Elias Randolph and his inmate number is [id number]. Patient was requesting a letter to be faxed by our office from MD stating that she was inpatient at [facility] called [phone] and asked to be transferred to case management. SW spoke to patient to notify that we were in the process of faxing said letter up to the intermediate. Patient informs that she's hoping they will allow him to come home early so he can assist in caring for her at discharge. SW left a message for  Vimal Casper asking for a fax number so we can forward this letter. Respectfully submitted,    PRASANNA Miles  [facility]   [phone]    Electronically signed by PRASANNA Morrison on 4/16/2022 at 12:20 PM

## 2022-04-16 NOTE — PROGRESS NOTES
Nausea and severe pain this am. Unable to eat or tolerate po meds due to nausea. One episode of emesis 150ml yellow bile. Medicated with zofran and dilaudid as ordered.  Small amount stool in colostomy, abdomen soft/

## 2022-04-16 NOTE — PROGRESS NOTES
Hospitalist Progress Note      PCP: No primary care provider on file. Date of Admission: 4/9/2022    Chief Complaint: abdominal pain, and bleeding per rectum. Hospital Course:   77-year-old with a past medical history of asthma, presented to emergency with generalized abdominal pain for the past 4 to 6 weeks as well as bleeding per rectum, patient also reported dysuria. Patient was found to have stage IV colon cancer, s/p transverse colectomy with end colostomy 4/13. Subjective:   Patient complaining of worsening abdominal pain. Was noted to have some retention yesterday however was able to pass urine, continues to report poor oral intake. Medications:  Reviewed    Infusion Medications    lactated ringers 100 mL/hr at 04/15/22 1437    sodium chloride       Scheduled Medications    polyethylene glycol  17 g Oral Daily    acetaminophen  1,000 mg Oral Q8H    docusate sodium  100 mg Oral BID    pantoprazole  40 mg Oral QAM AC    sodium chloride flush  5-40 mL IntraVENous 2 times per day    metroNIDAZOLE  500 mg Oral 2 times per day    doxycycline hyclate  100 mg Oral 2 times per day     PRN Meds: oxyCODONE **OR** oxyCODONE, HYDROmorphone **OR** HYDROmorphone, promethazine, sodium chloride flush, sodium chloride, ondansetron **OR** ondansetron, acetaminophen **OR** acetaminophen, dicyclomine      Intake/Output Summary (Last 24 hours) at 4/16/2022 1220  Last data filed at 4/16/2022 1020  Gross per 24 hour   Intake 360 ml   Output 1300 ml   Net -940 ml       Physical Exam Performed:    /78   Pulse 69   Temp 97.9 °F (36.6 °C)   Resp 16   Ht 5' 6\" (1.676 m)   Wt 228 lb 9.9 oz (103.7 kg)   LMP 04/10/2022   SpO2 100%   BMI 36.90 kg/m²     General appearance: No apparent distress, appears stated age and cooperative. HEENT: Pupils equal, round, and reactive to light. Conjunctivae/corneas clear. Neck: Supple, with full range of motion. No jugular venous distention.  Trachea and therefore   abnormalities of the catheter in this area, such as kinking cannot be   excluded. No evidence of pneumothorax. 2.  Otherwise no acute pulmonary disease or significant change noted. FL VASCULAR ACCESS GUIDANCE   Final Result      CT ABDOMEN W CONTRAST Additional Contrast? Radiologist Recommendation   Final Result   1. Disseminated hepatic metastatic disease. 2. Nonspecific distal esophageal wall thickening. Endoscopy recommended to   further evaluate. Neoplasm is a diagnostic consideration. RECOMMENDATIONS:   Unavailable         CT ABDOMEN PELVIS WO CONTRAST Additional Contrast? None   Final Result   1. Multiple hepatic lesions most consistent with metastatic disease. Findings concerning for an annular mass in the distal transverse colon,   likely a colonic primary with adjacent adenopathy. 2. No other acute findings within the abdomen or pelvis. Assessment/Plan:    Active Hospital Problems    Diagnosis     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 ([facility] [address] [C18.9, C78.7]     Rectal bleeding [K62.5]     Lower GI bleed [K92.2]     Trichimoniasis [A59.9]     Liver lesion [K76.9]     Colonic mass [K63.89]      Stage IV metastatic colon cancer. S/p transverse colectomy and colostomy creation 4/13. Patient presented with abdominal pain and bleeding per rectum, was found to have colon cancer on biopsy 4/11, underwent surgery 4/13 with transverse colectomy and colostomy creation. Continues to report abdominal pain and sluggish output  Plan.  -Agree with MiraLAX  -Continue colostomy teaching.  -Continue pain management  -Appreciate general surgery and oncology input.  -Continue LR for now in the improvement appetite    Anemia. Iron deficiency, received Venofer. Trichomonas. To be followed by OB/GYN as outpatient. Continue doxycycline and Flagyl    Intermittent urinary retention.   Patient was noted to have some urine retention, and required one-time straight cath. We will continue to bladder scan, straight cath if residual urine more than 500 mL. Social.  Letter written and faxed, social work help appreciated    DVT Prophylaxis: SCDs  Diet: ADULT DIET; Regular  ADULT ORAL NUTRITION SUPPLEMENT; Breakfast, Lunch;  Wound Healing Oral Supplement  Code Status: Full Code    PT/OT Eval Status: ambulatory     Dispo - pending clinical improvement      Irvin Jefferson MD

## 2022-04-16 NOTE — PROGRESS NOTES
Patient c/o sore throat and refusing PO pills at this time.  IV dilaudid given, and patient was able to urinate 400ml post escalona removal

## 2022-04-17 PROCEDURE — 6370000000 HC RX 637 (ALT 250 FOR IP): Performed by: SURGERY

## 2022-04-17 PROCEDURE — 1200000000 HC SEMI PRIVATE

## 2022-04-17 PROCEDURE — 6360000002 HC RX W HCPCS: Performed by: SURGERY

## 2022-04-17 PROCEDURE — 99024 POSTOP FOLLOW-UP VISIT: CPT | Performed by: SURGERY

## 2022-04-17 PROCEDURE — 2580000003 HC RX 258: Performed by: SURGERY

## 2022-04-17 RX ORDER — KETOROLAC TROMETHAMINE 15 MG/ML
15 INJECTION, SOLUTION INTRAMUSCULAR; INTRAVENOUS EVERY 6 HOURS
Status: DISPENSED | OUTPATIENT
Start: 2022-04-17 | End: 2022-04-19

## 2022-04-17 RX ADMIN — PANTOPRAZOLE SODIUM 40 MG: 40 TABLET, DELAYED RELEASE ORAL at 05:11

## 2022-04-17 RX ADMIN — ACETAMINOPHEN 1000 MG: 325 TABLET ORAL at 16:01

## 2022-04-17 RX ADMIN — DOXYCYCLINE HYCLATE 100 MG: 100 TABLET, FILM COATED ORAL at 20:52

## 2022-04-17 RX ADMIN — ONDANSETRON 4 MG: 2 INJECTION INTRAMUSCULAR; INTRAVENOUS at 08:22

## 2022-04-17 RX ADMIN — SODIUM CHLORIDE, POTASSIUM CHLORIDE, SODIUM LACTATE AND CALCIUM CHLORIDE: 600; 310; 30; 20 INJECTION, SOLUTION INTRAVENOUS at 20:49

## 2022-04-17 RX ADMIN — HYDROMORPHONE HYDROCHLORIDE 1 MG: 1 INJECTION, SOLUTION INTRAMUSCULAR; INTRAVENOUS; SUBCUTANEOUS at 01:21

## 2022-04-17 RX ADMIN — ACETAMINOPHEN 1000 MG: 325 TABLET ORAL at 20:52

## 2022-04-17 RX ADMIN — DOCUSATE SODIUM 100 MG: 100 CAPSULE, LIQUID FILLED ORAL at 20:52

## 2022-04-17 RX ADMIN — HYDROMORPHONE HYDROCHLORIDE 1 MG: 1 INJECTION, SOLUTION INTRAMUSCULAR; INTRAVENOUS; SUBCUTANEOUS at 09:22

## 2022-04-17 RX ADMIN — SODIUM CHLORIDE, POTASSIUM CHLORIDE, SODIUM LACTATE AND CALCIUM CHLORIDE: 600; 310; 30; 20 INJECTION, SOLUTION INTRAVENOUS at 01:01

## 2022-04-17 RX ADMIN — ACETAMINOPHEN 1000 MG: 325 TABLET ORAL at 05:11

## 2022-04-17 RX ADMIN — KETOROLAC TROMETHAMINE 15 MG: 15 INJECTION, SOLUTION INTRAMUSCULAR; INTRAVENOUS at 18:33

## 2022-04-17 RX ADMIN — KETOROLAC TROMETHAMINE 15 MG: 15 INJECTION, SOLUTION INTRAMUSCULAR; INTRAVENOUS at 11:52

## 2022-04-17 ASSESSMENT — PAIN DESCRIPTION - PROGRESSION
CLINICAL_PROGRESSION: NOT CHANGED
CLINICAL_PROGRESSION: GRADUALLY WORSENING
CLINICAL_PROGRESSION: NOT CHANGED

## 2022-04-17 ASSESSMENT — PAIN DESCRIPTION - DESCRIPTORS
DESCRIPTORS: CONSTANT;DISCOMFORT
DESCRIPTORS: CONSTANT
DESCRIPTORS: DULL
DESCRIPTORS: CONSTANT;DISCOMFORT
DESCRIPTORS: CONSTANT
DESCRIPTORS: CONSTANT

## 2022-04-17 ASSESSMENT — PAIN SCALES - WONG BAKER
WONGBAKER_NUMERICALRESPONSE: 6

## 2022-04-17 ASSESSMENT — PAIN SCALES - GENERAL
PAINLEVEL_OUTOF10: 0
PAINLEVEL_OUTOF10: 5
PAINLEVEL_OUTOF10: 3
PAINLEVEL_OUTOF10: 3
PAINLEVEL_OUTOF10: 5
PAINLEVEL_OUTOF10: 6
PAINLEVEL_OUTOF10: 6
PAINLEVEL_OUTOF10: 5
PAINLEVEL_OUTOF10: 6
PAINLEVEL_OUTOF10: 6
PAINLEVEL_OUTOF10: 3
PAINLEVEL_OUTOF10: 5
PAINLEVEL_OUTOF10: 7
PAINLEVEL_OUTOF10: 10
PAINLEVEL_OUTOF10: 6
PAINLEVEL_OUTOF10: 6
PAINLEVEL_OUTOF10: 0

## 2022-04-17 ASSESSMENT — PAIN DESCRIPTION - ORIENTATION
ORIENTATION: LEFT;LOWER
ORIENTATION: LEFT;LOWER
ORIENTATION: LEFT
ORIENTATION: LEFT
ORIENTATION: LEFT;LOWER

## 2022-04-17 ASSESSMENT — PAIN DESCRIPTION - FREQUENCY
FREQUENCY: CONTINUOUS

## 2022-04-17 ASSESSMENT — PAIN DESCRIPTION - ONSET
ONSET: ON-GOING

## 2022-04-17 ASSESSMENT — PAIN - FUNCTIONAL ASSESSMENT
PAIN_FUNCTIONAL_ASSESSMENT: PREVENTS OR INTERFERES SOME ACTIVE ACTIVITIES AND ADLS
PAIN_FUNCTIONAL_ASSESSMENT: PREVENTS OR INTERFERES WITH MANY ACTIVE NOT PASSIVE ACTIVITIES

## 2022-04-17 ASSESSMENT — PAIN DESCRIPTION - LOCATION
LOCATION: ABDOMEN

## 2022-04-17 ASSESSMENT — PAIN DESCRIPTION - PAIN TYPE
TYPE: SURGICAL PAIN

## 2022-04-17 NOTE — PROGRESS NOTES
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 Still not taking much PO  -add daily miralax with history chronic constipation  -toradol added for ongoing pain as well  -strongly encouraged ambulation, OOB        SUBJECTIVE  Chitra reports a bad night. C/o some nausea and dry heaves. Much improved today. +ostomy output        OBJECTIVE  VITALS:  height is 5' 6\" (1.676 m) and weight is 236 lb 15.9 oz (107.5 kg). Her oral temperature is 98.1 °F (36.7 °C). Her blood pressure is 132/74 and her pulse is 56. Her respiration is 18 and oxygen saturation is 100%. INTAKE/OUTPUT:      Intake/Output Summary (Last 24 hours) at 4/17/2022 1015  Last data filed at 4/17/2022 0984  Gross per 24 hour   Intake 6108.94 ml   Output 650 ml   Net 5458.94 ml     GENERAL: alert, cooperative, no distress  LUNGS: clear to ausculation, without wheezes, rales or rhonci  HEART: normal rate and regular rhythm  ABDOMEN: Soft, appropriately tender, non distended. Ostomy pink, viable. Stool in bag  EXTREMITY: no cyanosis, clubbing or edema    I/O last 3 completed shifts: In: 6348.9 [P.O.:420;  I.V.:5831.7; IV Piggyback:97.2]  Out: 1400 [Urine:1100; Emesis/NG output:150; Stool:150]      LABS  Recent Labs     04/16/22  0615   WBC 7.9   HGB 9.1*   HCT 26.7*         K 3.6      CO2 21   BUN 12   CREATININE 0.6   CALCIUM 8.1*       EDUCATION  Patient educated about Disease Process, Medications, Smoking Cessation, Oxygenation, Incentive Spirometry and Deep Breath and Cough, Diabetes, Hyperlipidemia, Smoking Cessation, Nutrition, Exercise and Hypertension    Electronically signed by Claude Milan MD on 4/17/2022 at 10:15 AM      [facility]   [phone] Office  [phone] Fax spouse

## 2022-04-17 NOTE — PLAN OF CARE
Problem: Pain:  Goal: Pain level will decrease  Description: Pain level will decrease  4/17/2022 1745 by Fay Bray RN  Outcome: Ongoing  4/17/2022 1745 by Fay Bray RN  Outcome: Ongoing  Goal: Control of acute pain  Description: Control of acute pain  4/17/2022 1745 by Fay Bray RN  Outcome: Ongoing  4/17/2022 1745 by Fay Bray RN  Outcome: Ongoing  Goal: Control of chronic pain  Description: Control of chronic pain  4/17/2022 1745 by Fay Bray RN  Outcome: Ongoing  4/17/2022 1745 by Fay Bray RN  Outcome: Ongoing     Problem: Safety:  Goal: Free from accidental physical injury  Description: Free from accidental physical injury  4/17/2022 1745 by Fay Bray RN  Outcome: Ongoing  4/17/2022 1745 by Fay Bray RN  Outcome: Ongoing  Goal: Free from intentional harm  Description: Free from intentional harm  4/17/2022 1745 by Fay Bray RN  Outcome: Ongoing  4/17/2022 1745 by Fay Bray RN  Outcome: Ongoing     Problem: Daily Care:  Goal: Daily care needs are met  Description: Daily care needs are met  4/17/2022 1745 by Fay Bray RN  Outcome: Ongoing  4/17/2022 1745 by Fay Bray RN  Outcome: Ongoing     Problem: Discharge Planning:  Goal: Patients continuum of care needs are met  Description: Patients continuum of care needs are met  4/17/2022 1745 by Fay Bray RN  Outcome: Ongoing  4/17/2022 1745 by Fay Bray RN  Outcome: Ongoing     Problem: Nutrition  Goal: Optimal nutrition therapy  4/17/2022 1745 by Fay Bray RN  Outcome: Ongoing  4/17/2022 1745 by Fay Bray RN  Outcome: Ongoing     Problem: Falls - Risk of:  Goal: Will remain free from falls  Description: Will remain free from falls  4/17/2022 1745 by Fay Bray RN  Outcome: Ongoing  4/17/2022 1745 by Fay Bray RN  Outcome: Ongoing  Goal: Absence of physical injury  Description: Absence of physical injury  4/17/2022 1745 by Fay Bray RN  Outcome: Ongoing  4/17/2022 1745 by Chhaya Elliott RN  Outcome: Ongoing     Problem: Skin Integrity:  Goal: Will show no infection signs and symptoms  Description: Will show no infection signs and symptoms  4/17/2022 1745 by Chhaya Elliott RN  Outcome: Ongoing  4/17/2022 1745 by Chhaya Elliott RN  Outcome: Ongoing  Goal: Absence of new skin breakdown  Description: Absence of new skin breakdown  4/17/2022 1745 by Chhaya Elliott RN  Outcome: Ongoing  4/17/2022 1745 by Chhaya Elliott RN  Outcome: Ongoing

## 2022-04-17 NOTE — PLAN OF CARE
Problem: Pain:  Goal: Pain level will decrease  Description: Pain level will decrease  Outcome: Ongoing  Goal: Control of acute pain  Description: Control of acute pain  Outcome: Ongoing  Goal: Control of chronic pain  Description: Control of chronic pain  Outcome: Ongoing     Problem: Safety:  Goal: Free from accidental physical injury  Description: Free from accidental physical injury  Outcome: Ongoing  Goal: Free from intentional harm  Description: Free from intentional harm  Outcome: Ongoing     Problem: Daily Care:  Goal: Daily care needs are met  Description: Daily care needs are met  Outcome: Ongoing     Problem: Discharge Planning:  Goal: Patients continuum of care needs are met  Description: Patients continuum of care needs are met  Outcome: Ongoing     Problem: Nutrition  Goal: Optimal nutrition therapy  Outcome: Ongoing     Problem: Falls - Risk of:  Goal: Will remain free from falls  Description: Will remain free from falls  Outcome: Ongoing  Goal: Absence of physical injury  Description: Absence of physical injury  Outcome: Ongoing     Problem: Skin Integrity:  Goal: Will show no infection signs and symptoms  Description: Will show no infection signs and symptoms  Outcome: Ongoing  Goal: Absence of new skin breakdown  Description: Absence of new skin breakdown  Outcome: Ongoing

## 2022-04-17 NOTE — PROGRESS NOTES
Pt has been receiving Dilaudid PRN for her complaints of pain. Movement exacerbates her pain. VSS. Lorrin Snare Assistance given to bathroom and back to bed.

## 2022-04-17 NOTE — PROGRESS NOTES
Pain better controlled today. Patient moving around room more with sba only. Intermittent nausea but no vomiting today.

## 2022-04-17 NOTE — PROGRESS NOTES
Hospitalist Progress Note      PCP: No primary care provider on file. Date of Admission: 4/9/2022    Chief Complaint: abdominal pain, and bleeding per rectum. Hospital Course:   58-year-old with a past medical history of asthma, presented to emergency with generalized abdominal pain for the past 4 to 6 weeks as well as bleeding per rectum, patient also reported dysuria. Patient was found to have stage IV colon cancer, s/p transverse colectomy with end colostomy 4/13. Subjective:   Pain significantly improved compared to yesterday, continues to have limited oral intake. Voiding urine normally. Medications:  Reviewed    Infusion Medications    lactated ringers 100 mL/hr at 04/17/22 0101    sodium chloride       Scheduled Medications    ketorolac  15 mg IntraVENous Q6H    polyethylene glycol  17 g Oral Daily    acetaminophen  1,000 mg Oral Q8H    docusate sodium  100 mg Oral BID    pantoprazole  40 mg Oral QAM AC    sodium chloride flush  5-40 mL IntraVENous 2 times per day    doxycycline hyclate  100 mg Oral 2 times per day     PRN Meds: oxyCODONE **OR** oxyCODONE, HYDROmorphone **OR** HYDROmorphone, promethazine, sodium chloride flush, sodium chloride, ondansetron **OR** ondansetron, acetaminophen **OR** acetaminophen, dicyclomine      Intake/Output Summary (Last 24 hours) at 4/17/2022 1445  Last data filed at 4/17/2022 0918  Gross per 24 hour   Intake 6108.94 ml   Output 100 ml   Net 6008.94 ml       Physical Exam Performed:    BP (!) 152/72   Pulse 55   Temp 98.2 °F (36.8 °C) (Oral)   Resp 16   Ht 5' 6\" (1.676 m)   Wt 236 lb 15.9 oz (107.5 kg)   LMP 04/10/2022   SpO2 98%   BMI 38.25 kg/m²     General appearance: No apparent distress, appears stated age and cooperative. HEENT: Pupils equal, round, and reactive to light. Conjunctivae/corneas clear. Neck: Supple, with full range of motion. No jugular venous distention. Trachea midline.   Respiratory:  Normal respiratory effort. Clear to auscultation, bilaterally without Rales/Wheezes/Rhonchi. Cardiovascular: Regular rate and rhythm with normal S1/S2 without murmurs, rubs or gallops. Abdomen: Functioning colostomy, abdominal tenderness noted on the left lower zone. Musculoskeletal: No clubbing, cyanosis or edema bilaterally. Full range of motion without deformity. Skin: Skin color, texture, turgor normal.  No rashes or lesions. Neurologic:  Neurovascularly intact without any focal sensory/motor deficits. Cranial nerves: II-XII intact, grossly non-focal.  Psychiatric: Alert and oriented, thought content appropriate, normal insight  Capillary Refill: Brisk,3 seconds, normal   Peripheral Pulses: +2 palpable, equal bilaterally       Labs:   Recent Labs     04/15/22  0713 04/16/22  0615   WBC 8.1 7.9   HGB 9.4* 9.1*   HCT 28.1* 26.7*    248     Recent Labs     04/15/22  0713 04/16/22  0615    137   K 3.7 3.6    105   CO2 20* 21   BUN 12 12   CREATININE 0.7 0.6   CALCIUM 8.2* 8.1*     No results for input(s): AST, ALT, BILIDIR, BILITOT, ALKPHOS in the last 72 hours. No results for input(s): INR in the last 72 hours. Recent Labs     04/15/22  0713   TROPONINI <0.01       Urinalysis:      Lab Results   Component Value Date    NITRU Negative 04/09/2022    WBCUA 21-50 04/09/2022    BACTERIA 2+ 04/09/2022    RBCUA 3-4 04/09/2022    BLOODU SMALL 04/09/2022    SPECGRAV 1.020 04/09/2022    GLUCOSEU Negative 04/09/2022       Radiology:  CT CHEST WO CONTRAST   Final Result   1. Trace bilateral pleural effusions. 2. Unchanged diffuse hepatic metastases. XR CHEST PORTABLE   Final Result      1. Left-sided chest port has been placed via the left internal jugular vein   with the tip in the lower right atrium. A loop of the catheter extends   cephalad in the neck and is not included on this study and therefore   abnormalities of the catheter in this area, such as kinking cannot be   excluded.   No evidence of pneumothorax. 2.  Otherwise no acute pulmonary disease or significant change noted. FL VASCULAR ACCESS GUIDANCE   Final Result      CT ABDOMEN W CONTRAST Additional Contrast? Radiologist Recommendation   Final Result   1. Disseminated hepatic metastatic disease. 2. Nonspecific distal esophageal wall thickening. Endoscopy recommended to   further evaluate. Neoplasm is a diagnostic consideration. RECOMMENDATIONS:   Unavailable         CT ABDOMEN PELVIS WO CONTRAST Additional Contrast? None   Final Result   1. Multiple hepatic lesions most consistent with metastatic disease. Findings concerning for an annular mass in the distal transverse colon,   likely a colonic primary with adjacent adenopathy. 2. No other acute findings within the abdomen or pelvis. Assessment/Plan:    Active Hospital Problems    Diagnosis     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 ([facility] [address] [C18.9, C78.7]     Rectal bleeding [K62.5]     Lower GI bleed [K92.2]     Trichimoniasis [A59.9]     Liver lesion [K76.9]     Colonic mass [K63.89]      Stage IV metastatic colon cancer. S/p transverse colectomy and colostomy creation 4/13. Patient presented with abdominal pain and bleeding per rectum, was found to have colon cancer on biopsy 4/11, underwent surgery 4/13 with transverse colectomy and colostomy creation. Abdominal pain improving, input continues to be poor  Plan.  -Agree with MiraLAX  -Continue colostomy teaching.  -Continue pain management  -PT for increased ambulation  -Appreciate general surgery and oncology input.  -Continue LR for now in the improvement appetite    Anemia. Iron deficiency, received Venofer. Trichomonas. To be followed by OB/GYN as outpatient. Continue doxycycline and Flagyl    Intermittent urinary retention. Patient was noted to have some urine retention, and required one-time straight cath.   We will continue to bladder scan, straight cath if residual urine more than 500 mL. Social.  Letter written and faxed, social work help appreciated    DVT Prophylaxis: SCDs  Diet: ADULT DIET; Regular  ADULT ORAL NUTRITION SUPPLEMENT; Breakfast, Lunch;  Wound Healing Oral Supplement  Code Status: Full Code    PT/OT Eval Status: ambulatory     Dispo - pending clinical improvement      Juwan Martinez MD

## 2022-04-18 PROCEDURE — 97166 OT EVAL MOD COMPLEX 45 MIN: CPT

## 2022-04-18 PROCEDURE — 6360000002 HC RX W HCPCS: Performed by: SURGERY

## 2022-04-18 PROCEDURE — APPSS45 APP SPLIT SHARED TIME 31-45 MINUTES: Performed by: PHYSICIAN ASSISTANT

## 2022-04-18 PROCEDURE — 97530 THERAPEUTIC ACTIVITIES: CPT

## 2022-04-18 PROCEDURE — 1200000000 HC SEMI PRIVATE

## 2022-04-18 PROCEDURE — 94760 N-INVAS EAR/PLS OXIMETRY 1: CPT

## 2022-04-18 PROCEDURE — 97535 SELF CARE MNGMENT TRAINING: CPT

## 2022-04-18 PROCEDURE — 99024 POSTOP FOLLOW-UP VISIT: CPT | Performed by: PHYSICIAN ASSISTANT

## 2022-04-18 PROCEDURE — 97163 PT EVAL HIGH COMPLEX 45 MIN: CPT

## 2022-04-18 PROCEDURE — 2580000003 HC RX 258: Performed by: SURGERY

## 2022-04-18 PROCEDURE — 6370000000 HC RX 637 (ALT 250 FOR IP): Performed by: SURGERY

## 2022-04-18 PROCEDURE — APPNB45 APP NON BILLABLE 31-45 MINUTES: Performed by: PHYSICIAN ASSISTANT

## 2022-04-18 RX ADMIN — PANTOPRAZOLE SODIUM 40 MG: 40 TABLET, DELAYED RELEASE ORAL at 05:28

## 2022-04-18 RX ADMIN — ACETAMINOPHEN 1000 MG: 325 TABLET ORAL at 20:32

## 2022-04-18 RX ADMIN — ACETAMINOPHEN 1000 MG: 325 TABLET ORAL at 05:27

## 2022-04-18 RX ADMIN — KETOROLAC TROMETHAMINE 15 MG: 15 INJECTION, SOLUTION INTRAMUSCULAR; INTRAVENOUS at 00:14

## 2022-04-18 RX ADMIN — DOCUSATE SODIUM 100 MG: 100 CAPSULE, LIQUID FILLED ORAL at 20:32

## 2022-04-18 RX ADMIN — ONDANSETRON 4 MG: 2 INJECTION INTRAMUSCULAR; INTRAVENOUS at 05:28

## 2022-04-18 RX ADMIN — KETOROLAC TROMETHAMINE 15 MG: 15 INJECTION, SOLUTION INTRAMUSCULAR; INTRAVENOUS at 05:28

## 2022-04-18 RX ADMIN — DOCUSATE SODIUM 100 MG: 100 CAPSULE, LIQUID FILLED ORAL at 10:41

## 2022-04-18 RX ADMIN — ACETAMINOPHEN 1000 MG: 325 TABLET ORAL at 12:31

## 2022-04-18 RX ADMIN — KETOROLAC TROMETHAMINE 15 MG: 15 INJECTION, SOLUTION INTRAMUSCULAR; INTRAVENOUS at 18:13

## 2022-04-18 RX ADMIN — KETOROLAC TROMETHAMINE 15 MG: 15 INJECTION, SOLUTION INTRAMUSCULAR; INTRAVENOUS at 12:32

## 2022-04-18 RX ADMIN — SODIUM CHLORIDE, PRESERVATIVE FREE 10 ML: 5 INJECTION INTRAVENOUS at 20:33

## 2022-04-18 ASSESSMENT — PAIN DESCRIPTION - PAIN TYPE
TYPE: SURGICAL PAIN
TYPE: SURGICAL PAIN

## 2022-04-18 ASSESSMENT — PAIN DESCRIPTION - ONSET: ONSET: ON-GOING

## 2022-04-18 ASSESSMENT — PAIN DESCRIPTION - DESCRIPTORS
DESCRIPTORS: CONSTANT;DISCOMFORT
DESCRIPTORS: DISCOMFORT

## 2022-04-18 ASSESSMENT — PAIN DESCRIPTION - FREQUENCY: FREQUENCY: CONTINUOUS

## 2022-04-18 ASSESSMENT — PAIN DESCRIPTION - LOCATION
LOCATION: ABDOMEN
LOCATION: ABDOMEN

## 2022-04-18 ASSESSMENT — PAIN SCALES - GENERAL
PAINLEVEL_OUTOF10: 4
PAINLEVEL_OUTOF10: 0
PAINLEVEL_OUTOF10: 3
PAINLEVEL_OUTOF10: 5

## 2022-04-18 ASSESSMENT — PAIN - FUNCTIONAL ASSESSMENT: PAIN_FUNCTIONAL_ASSESSMENT: PREVENTS OR INTERFERES SOME ACTIVE ACTIVITIES AND ADLS

## 2022-04-18 ASSESSMENT — PAIN DESCRIPTION - PROGRESSION: CLINICAL_PROGRESSION: NOT CHANGED

## 2022-04-18 ASSESSMENT — PAIN DESCRIPTION - ORIENTATION: ORIENTATION: LEFT;LOWER

## 2022-04-18 NOTE — CARE COORDINATION
Discharge Planning:    Patient s/p lap transverse colectomy with end colostomy. Patient agreeable to home care for skilled nursing. Followed up on referrals placed for home care:    [facility] message requesting call back  [facility]-unable to staff  [facility]-left message requesting call back  [facility] message requesting call back    Will continue to monitor. Made another attempt to reach  at [employer] re: letter stating patient's dx. Requested call back with fax number to send letter. Electronically signed by Liliana Cardoza RN on 4/18/2022 at 2:38 PM    [facility] returned call and is now checking on staffing. Will call back to confirm if they can take patient. Electronically signed by Liliana Cardoza RN on 4/18/2022 at 3:01 PM     [facility] [place]. returned call and is now checking on staffing. Will call back to confirm if they can take patient.     Electronically signed by Liliana Cardoza RN on 4/18/2022 at 3:21 PM

## 2022-04-18 NOTE — PROGRESS NOTES
Physical Therapy    Facility/Department: [address] MED SURG  Initial Assessment    NAME: Abigail Menon  : 1980  MRN: [id number]    Date of Service: 2022    Discharge Recommendations:  Home with assist PRN   PT Equipment Recommendations  Other: continue to assess    Assessment   Body structures, Functions, Activity limitations: Decreased functional mobility ; Decreased ADL status; Decreased endurance; Increased pain  Assessment: Patient presented to [facility] emergency department on 22 for evaluation of generalized abdominal pain, onset 4-6 weeks ago. 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 Underwent colostomy on 22. Prior to admission, pt lving with family in home setting with 6 HEDY, independent with ADLs and ambulation. Pt currently functioning below baseline. Anticipate return home with PRN assist. Will continue to assess for HHPT and DME - was using IV pole this date and therefore may need SPC. Treatment Diagnosis: impaired mobility  Prognosis: Guarded  Decision Making: High Complexity  History: see below  Exam: see below  Clinical Presentation: unpredictable  PT Education: PT Role;Plan of Care;General Safety; Functional Mobility Training  REQUIRES PT FOLLOW UP: Yes  Activity Tolerance  Activity Tolerance: Patient limited by pain       Patient Diagnosis(es): The primary encounter diagnosis was Generalized abdominal pain. A diagnosis of Abnormal CT of the abdomen was also pertinent to this visit. has a past medical history of Asthma and Colon cancer ([facility] [address] has a past surgical history that includes Colonoscopy (N/A, 2022); Colonoscopy (2022); and colostomy (N/A, 2022).     Restrictions  Position Activity Restriction  Other position/activity restrictions: colostomy     Vision/Hearing  Vision: Impaired  Vision Exceptions: Wears glasses at all times  Hearing: Within functional limits       Subjective  General  Chart Reviewed: Yes  Patient assessed for rehabilitation services?: Yes  Additional Pertinent Hx: Patient presented to [facility] emergency department on 4/9/22 for evaluation of generalized abdominal pain, onset 4-6 weeks ago. 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 Underwent colostomy on 4/13/22. 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           Orientation  Orientation  Overall Orientation Status: Within Functional Limits     Social/Functional History  Social/Functional History  Lives With: Family (12, 15, 6)  Type of Home: House  Home Layout: Two level,Laundry in basement  Home Access: Stairs to enter with rails  Entrance Stairs - Number of Steps: 6  Entrance Stairs - Rails: Left  Bathroom Shower/Tub: Tub/Shower unit  Bathroom Toilet: Standard  ADL Assistance: Independent  Homemaking Assistance: Independent  Ambulation Assistance: Independent  Transfer Assistance: Independent  Active : Yes  Mode of Transportation: Car  Occupation: Full time employment  Type of occupation: school transportation  Additional Comments: denies recent falls     Cognition   Cognition  Overall Cognitive Status: WFL    Objective  Strength RLE  Strength RLE: WFL  Strength LLE  Strength LLE: WFL  Motor Control  Gross Motor?: WFL     Bed mobility  Supine to Sit: Stand by assistance  Sit to Supine: Unable to assess (in recliner at end of session)  Comment: effortful; painful  Transfers  Sit to Stand: Contact guard assistance  Stand to sit: Contact guard assistance  Comment: effortful and painful  Ambulation  Ambulation?: Yes  Ambulation 1  Surface: level tile  Device: Hand-Held Assist (holding onto IV)  Assistance: Contact guard assistance  Gait Deviations: Slow Ree;Decreased step length;Decreased step height  Distance: 10' + 30'  Comments: stood at sink to wash face  Stairs/Curb  Stairs?: No     Balance  Posture: Good  Sitting - Static: Good  Sitting - Dynamic: Good  Standing - Static: Fair  Standing - Dynamic: [address]  Times per week: 3-5x/week  Current Treatment Recommendations: Functional Mobility Training,Neuromuscular Re-education,Equipment Evaluation, Education, & procurement,Safety Education & Training,Patient/Caregiver Education & Training,Gait Training,Transfer Training,Balance Training,Endurance Training,Stair training  Safety Devices  Type of devices: Call light within reach,Left in chair,Nurse notified      AM-PAC Score  AM-PAC Inpatient Mobility Raw Score : 18 (04/18/22 0955)  AM-PAC Inpatient T-Scale Score : 43.63 (04/18/22 0955)  Mobility Inpatient CMS 0-100% Score: 46.58 (04/18/22 0955)  Mobility Inpatient CMS G-Code Modifier : CK (04/18/22 0955)          Goals  Short term goals  Time Frame for Short term goals: by acute discharge  Short term goal 1: bed mobility mod I  Short term goal 2: sit<>stand independently  Short term goal 3: ambulate > 50' independently  Short term goal 4: ascend/descend 4 steps with rail and SBA  Patient Goals   Patient goals : none stated       Therapy Time   Individual Concurrent Group Co-treatment   Time In 0920         Time Out 0950         Minutes 30         Timed Code Treatment Minutes: 15 Minutes       [facility] PT

## 2022-04-18 NOTE — PLAN OF CARE
Problem: Pain:  Goal: Pain level will decrease  Description: Pain level will decrease  Outcome: Ongoing  Goal: Control of acute pain  Description: Control of acute pain  Outcome: Ongoing  Goal: Control of chronic pain  Description: Control of chronic pain  Outcome: Ongoing     Problem: Safety:  Goal: Free from accidental physical injury  Description: Free from accidental physical injury  Outcome: Ongoing  Goal: Free from intentional harm  Description: Free from intentional harm  Outcome: Ongoing     Problem: Discharge Planning:  Goal: Patients continuum of care needs are met  Description: Patients continuum of care needs are met  Outcome: Ongoing     Problem: Nutrition  Goal: Optimal nutrition therapy  Outcome: Ongoing     Problem: Falls - Risk of:  Goal: Will remain free from falls  Description: Will remain free from falls  Outcome: Ongoing  Goal: Absence of physical injury  Description: Absence of physical injury  Outcome: Ongoing     Problem: Skin Integrity:  Goal: Will show no infection signs and symptoms  Description: Will show no infection signs and symptoms  Outcome: Ongoing  Goal: Absence of new skin breakdown  Description: Absence of new skin breakdown  Outcome: Ongoing

## 2022-04-18 NOTE — PROGRESS NOTES
[facility] General Surgery                                   Daily Progress Note                                                         Pt Name: Kinga Fierro  Medical Record Number: [id number]  Date of Birth 1980   Today's Date: 4/18/2022    ASSESSMENT/PLAN  Metastatic near obstructing transverse colon cancer  -4/13/22 left IJ PAC, lap transverse colectomy with end colostomy, lap wedge biopsy of liver mass    - Ostomy teaching   -On regular diet. Denies taking much in PO still with some nausea, but was able to tolerate more PO intake at dinner last night. Did not start/try to eat breakfast yet this AM when rounding. Discussed the importance of nutrition.  -+gas and stool in ostomy appliance  -Daily miralax with history chronic constipation  -Continue toradol for ongoing pain as well  -sitting up in chair. Encouraged ambulation.  -Path: invasive adenocarcinoma, + lymph nodes (3/12)    SUBJECTIVE  Chitra reports feeling  better today. C/o some nausea still but was able to tolerate more for dinner last night.  +ostomy output    OBJECTIVE  VITALS:  height is 5' 6\" (1.676 m) and weight is 253 lb 12 oz (115.1 kg). Her oral temperature is 98.1 °F (36.7 °C). Her blood pressure is 160/88 (abnormal) and her pulse is 66. Her respiration is 18 and oxygen saturation is 98%. INTAKE/OUTPUT:      Intake/Output Summary (Last 24 hours) at 4/18/2022 1128  Last data filed at 4/17/2022 1850  Gross per 24 hour   Intake 2286.96 ml   Output 500 ml   Net 1786.96 ml     GENERAL: alert, cooperative, no distress  LUNGS: clear to ausculation, without wheezes, rales or rhonci  HEART: normal rate and regular rhythm  ABDOMEN: Soft, appropriately tender, non distended. Ostomy pink, viable. Stool and gas in bag  EXTREMITY: no cyanosis, clubbing or edema    I/O last 3 completed shifts: In: 2407 [P.O.:170;  I.V.:2237]  Out: 600 [Urine:300; Stool:300]      LABS  Recent Labs 04/16/22  0615   WBC 7.9   HGB 9.1*   HCT 26.7*         K 3.6      CO2 21   BUN 12   CREATININE 0.6   CALCIUM 8.1*       EDUCATION  Patient educated about Disease Process, Medications, Smoking Cessation, Oxygenation, Incentive Spirometry and Deep Breath and Cough, Diabetes, Hyperlipidemia, Smoking Cessation, Nutrition, Exercise and Hypertension    Electronically signed by Jake Pearson PA-C on 4/18/2022 at 11:28 AM      [facility]   [phone] Office  [phone] Fax

## 2022-04-18 NOTE — PLAN OF CARE
Baudilio Plummer RN  Outcome: Ongoing  4/17/2022 1745 by Baudilio Plummer RN  Outcome: Ongoing     Problem: Falls - Risk of:  Goal: Will remain free from falls  Description: Will remain free from falls  4/18/2022 0118 by Alana Hurley RN  Outcome: Ongoing  4/17/2022 1745 by Baudilio Plummer RN  Outcome: Ongoing  4/17/2022 1745 by Baudilio Plummer RN  Outcome: Ongoing  Goal: Absence of physical injury  Description: Absence of physical injury  4/18/2022 0118 by Alana Hurley RN  Outcome: Ongoing  4/17/2022 1745 by Baudilio Plummer RN  Outcome: Ongoing  4/17/2022 1745 by Baudilio Plummer RN  Outcome: Ongoing     Problem: Skin Integrity:  Goal: Will show no infection signs and symptoms  Description: Will show no infection signs and symptoms  4/18/2022 0118 by Alana Hurley RN  Outcome: Ongoing  4/17/2022 1745 by Baudilio Plummer RN  Outcome: Ongoing  4/17/2022 1745 by Baudilio Plummer RN  Outcome: Ongoing  Goal: Absence of new skin breakdown  Description: Absence of new skin breakdown  4/18/2022 0118 by Alana Hurley RN  Outcome: Ongoing  4/17/2022 1745 by Baudilio Plummer RN  Outcome: Ongoing  4/17/2022 1745 by Baudilio Plummer RN  Outcome: Ongoing

## 2022-04-18 NOTE — CARE COORDINATION
Ostomy Referral Follow-up Progress Note      NAME:  Caitlin Guerrero  MEDICAL RECORD NUMBER:  [id number]  AGE: 36 y.o. GENDER:  female  :  1980  TODAY'S DATE:  2022    Subjective:   Metastatic near obstructing transverse colon cancer  -22 left IJ PAC, lap transverse colectomy with end colostomy, lap wedge biopsy of liver mass    Objective    BP (!) 160/88   Pulse 66   Temp 98.1 °F (36.7 °C) (Oral)   Resp 18   Ht 5' 6\" (1.676 m)   Wt 253 lb 12 oz (115.1 kg)   LMP 04/10/2022   SpO2 98%   BMI 40.96 kg/m²     Derrick Risk Score Derrick Scale Score: 19    Assessment         Colostomy LUQ Transverse (Active)   Stomal Appliance 2 piece; Changed 22 1358   Stoma  Assessment Protrudes;Pink;Moist 22 1358   Mucocutaneous Junction Intact 22 1358   Peristomal Assessment Clean; Intact 22 1358   Treatment Bag change 22 1358   Stool Appearance Loose 22 1358   Stool Color Brown 22 1358   Stool Amount Medium 22 1358   Output (mL) 100 ml 22 1358   Number of days: 4       Pt sitting up in chair with daughter in room. Pt removed pouch on her own. Pt was able to perform site care, place barrier ring, cut pouch to fit stoma, and place pouch without assistance. Pt did well with all aspects of ostomy care. Pt able to open and close pouch on her own. Pt has been emptying pouch with nursing assistance. Pt hesitant to look at stoma, however does well with encouragement. Intake/Output Summary (Last 24 hours) at 2022 1358  Last data filed at 2022 1358  Gross per 24 hour   Intake 2236.96 ml   Output 600 ml   Net 1636.96 ml       Plan:   Plan for Ostomy Care:  Possible d/c Wednesday. Will plan to meet with patient 1 more time on Wednesday to prep for d/c. SenSura Dickey Flex convex #[id number] (2-\" YELLOW)  Sensura Daniel Flex Drainable Pouch #[id number] YELLOW  Brava protective Seal thin [id number]  Routine ostomy care -   Change pouch every 3 days & PRN.  Empty when 1/3 to 1/2 full of stool or gas. Ostomy Plan of Care  [x] Supplies/Instructions left in room  [] Patient using home supplies  [x] Brand/supplies at bedside coloplast    Current Diet: ADULT DIET; Regular  ADULT ORAL NUTRITION SUPPLEMENT; Breakfast, Lunch;  Wound Healing Oral Supplement  Dietician consult:  Yes    Discharge Plan:  Placement for patient upon discharge: home with support    Outpatient visit plan Yes  Supplies given Yes   Samples requested No    Referrals:  []   [] [facility]  [] Supplies  [] Other      Patient/Caregiver Teaching:  Written Instructions given to patient/family  Teaching provided:  [] Reviewed GI and A&P        [x] Supplies  [x] Pouch emptying      [] Manipulate closure  [x] Routine Care         [] Comment  [x] Pouch maintenance           Level of patient/caregiver understanding able to:  [x] Indicates understanding       [] Needs reinforcement  [] Unsuccessful      [x] Verbal Understanding  [x] Demonstrated understanding       [] No evidence of learning  [] Refused teaching         [] N/A    Electronically signed by Harpreet Mcfarland RN, CWOCN on 4/18/2022 at 1:58 PM

## 2022-04-18 NOTE — PROGRESS NOTES
Comprehensive Nutrition Assessment    Type and Reason for Visit:  Reassess    Nutrition Recommendations/Plan:   -Regular diet  -Sharan BID  -Continue to monitor intakes, BM's and clinical progress    Nutrition Assessment:  Follow-up. Patient continues on Regular diet with limited oral intake, less than 25% recorded intakes. She is taking in Evelyne and states she plans to try to eat most of her lunch today. Not open to trying any other ONS at this time. Reports ongoing abdominal pain and nausea, however feeling better today. Ostomy output starting to produce, with +BM on 4/16. Will continue to monitor intakes and clinical progress. Malnutrition Assessment:  Malnutrition Status:  No malnutrition    Context:  Chronic Illness     Findings of the 6 clinical characteristics of malnutrition:  Energy Intake:  Mild decrease in energy intake (Comment)  Weight Loss:  No significant weight loss     Body Fat Loss:  No significant body fat loss     Muscle Mass Loss:  No significant muscle mass loss    Fluid Accumulation:  No significant fluid accumulation     Strength:  Not Performed    Estimated Daily Nutrient Needs:  Energy (kcal):  2711-3205 (11-14kcal/102kg); Weight Used for Energy Requirements:  Current     Protein (g):  71-83 (1.2-1.4g/59kg); Weight Used for Protein Requirements:  Ideal        Method Used for Fluid Requirements:  1 ml/kcal      Nutrition Related Findings:  +BM 4/16      Wounds:  Surgical Incision       Current Nutrition Therapies:    ADULT DIET; Regular  ADULT ORAL NUTRITION SUPPLEMENT; Breakfast, Lunch;  Wound Healing Oral Supplement    Anthropometric Measures:  · Height: 5' 6\" (167.6 cm)  · Current Body Weight: 253 lb (114.8 kg)   · Admission Body Weight: 225 lb (102.1 kg)    · Ideal Body Weight: 130 lbs; % Ideal Body Weight 194.6 %   · BMI: 40.9  · Adjusted Body Weight:  No Adjustment   · BMI Categories: Obese Class 3 (BMI 40.0 or greater)       Nutrition Diagnosis:   · Inadequate oral intake related to inadequate protein-energy intake as evidenced by intake 0-25%      Nutrition Interventions:   Food and/or Nutrient Delivery:  Continue Current Diet,Continue Oral Nutrition Supplement  Nutrition Education/Counseling:  Education not indicated   Coordination of Nutrition Care:  Continue to monitor while inpatient    Goals:  Consume greater than 50% of meals and supplements       Nutrition Monitoring and Evaluation:   Behavioral-Environmental Outcomes:  None Identified   Food/Nutrient Intake Outcomes:  Food and Nutrient Intake,Supplement Intake  Physical Signs/Symptoms Outcomes:  Biochemical Data,Nutrition Focused Physical Findings,Nausea or Vomiting,GI Status,Weight     Discharge Planning:     Too soon to determine     Electronically signed by Poli Villanueva on 4/18/22 at 10:24 AM EDT    Contact: [phone]

## 2022-04-18 NOTE — PROGRESS NOTES
[facility]  Oncology Hematology Care  Progress Note      SUBJECTIVE:   Pt doing ok     OBJECTIVE      Medications    Current Facility-Administered Medications: ketorolac (TORADOL) injection 15 mg, 15 mg, IntraVENous, Q6H  polyethylene glycol (GLYCOLAX) packet 17 g, 17 g, Oral, Daily  lactated ringers infusion, , IntraVENous, Continuous  acetaminophen (TYLENOL) tablet 1,000 mg, 1,000 mg, Oral, Q8H  oxyCODONE (ROXICODONE) immediate release tablet 5 mg, 5 mg, Oral, Q4H PRN **OR** oxyCODONE HCl (OXY-IR) immediate release tablet 10 mg, 10 mg, Oral, Q4H PRN  HYDROmorphone (DILAUDID) injection 0.5 mg, 0.5 mg, IntraVENous, Q2H PRN **OR** HYDROmorphone (DILAUDID) injection 1 mg, 1 mg, IntraVENous, Q2H PRN  docusate sodium (COLACE) capsule 100 mg, 100 mg, Oral, BID  pantoprazole (PROTONIX) tablet 40 mg, 40 mg, Oral, QAM AC  promethazine (PHENERGAN) 12.5mg in sodium chloride 0.9% 50 mL IVPB 12.5 mg, 12.5 mg, IntraVENous, Q6H PRN  sodium chloride flush 0.9 % injection 5-40 mL, 5-40 mL, IntraVENous, 2 times per day  sodium chloride flush 0.9 % injection 5-40 mL, 5-40 mL, IntraVENous, PRN  0.9 % sodium chloride infusion, , IntraVENous, PRN  ondansetron (ZOFRAN-ODT) disintegrating tablet 4 mg, 4 mg, Oral, Q8H PRN **OR** ondansetron (ZOFRAN) injection 4 mg, 4 mg, IntraVENous, Q6H PRN  acetaminophen (TYLENOL) tablet 650 mg, 650 mg, Oral, Q6H PRN **OR** acetaminophen (TYLENOL) suppository 650 mg, 650 mg, Rectal, Q6H PRN  dicyclomine (BENTYL) capsule 20 mg, 20 mg, Oral, Q6H PRN  doxycycline hyclate (VIBRA-TABS) tablet 100 mg, 100 mg, Oral, 2 times per day  Physical    VITALS:  BP (!) 160/88   Pulse 66   Temp 98.1 °F (36.7 °C) (Oral)   Resp 21   Ht 5' 6\" (1.676 m)   Wt 253 lb 12 oz (115.1 kg)   LMP 04/10/2022   SpO2 94%   BMI 40.96 kg/m²   TEMPERATURE:  Current - Temp: 98.1 °F (36.7 °C);  Max - Temp  Av.5 °F (36.9 °C)  Min: 98.1 °F (36.7 °C)  Max: 99.1 °F (37.3 °C)  PULSE OXIMETRY RANGE: SpO2  Av.7 %  Min: 94 %  Max: 98 %  24HR INTAKE/OUTPUT:      Intake/Output Summary (Last 24 hours) at 4/18/2022 0818  Last data filed at 4/17/2022 1850  Gross per 24 hour   Intake 2406.96 ml   Output 600 ml   Net 1806.96 ml       CONSTITUTIONAL:  awake, alert, cooperative, no apparent distress, HEENT oral pharynx , no scleral icterus  HEMATOLOGIC/LYMPHATICS:  no cervical lymphadenopathy, no supraclavicular lymphadenopathy,  LUNGS:  No increased work of breathing, good air exchange, clear to auscultation bilaterally, no crackles or wheezing  CARDIOVASCULAR:  , regular rate and rhythm, normal S1 and S2, no S3 or S4, and no murmur noted  ABDOMEN:  Appropriate tender   MUSCULOSKELETAL:  There is no redness, warmth, or swelling of the joints. EXTREMETIES: No clubbing cynosis or edema  NEUROLOGIC:  Awake, alert, oriented to name, place and time. Cranial nerves II-XII are grossly intact. =  SKIN:  no bruising or bleeding      Data      Recent Labs     04/16/22  0615   WBC 7.9   HGB 9.1*   HCT 26.7*      MCV 95.4        Recent Labs     04/16/22  0615      K 3.6      CO2 21   BUN 12   CREATININE 0.6     No results for input(s): AST, ALT, ALB, BILIDIR, BILITOT, ALKPHOS in the last 72 hours. Magnesium:  No results found for: MG    Imaging CT ABDOMEN PELVIS WO CONTRAST Additional Contrast? None    Result Date: 4/10/2022  EXAMINATION: CT OF THE ABDOMEN AND PELVIS WITHOUT CONTRAST 4/9/2022 12:39 pm TECHNIQUE: CT of the abdomen and pelvis was performed without the administration of intravenous contrast. Multiplanar reformatted images are provided for review. Dose modulation, iterative reconstruction, and/or weight based adjustment of the mA/kV was utilized to reduce the radiation dose to as low as reasonably achievable.  COMPARISON: 12/23/2019 HISTORY: ORDERING SYSTEM PROVIDED HISTORY: generalized abdominal pain TECHNOLOGIST PROVIDED HISTORY: Reason for exam:->generalized abdominal pain Additional Contrast?->None Decision Support Exception - unselect if not a suspected or confirmed emergency medical condition->Emergency Medical Condition (MA) Is the patient pregnant?->No Reason for Exam: PT. C/O GENERALIZED ABD PAIN X 2 WEEKS, WITH SOME BLOOD IN STOOL WITH NAUSEA  AND EMESIS, STATES HAS HAD INCREASED PAIN WITH URINATION Relevant Medical/Surgical History: NO HX OF ABD PROBLEMS, NO HX OF SURGERY TO ABD, NO HX OF CA FINDINGS: Lower Chest: No acute infiltrate at the lung bases. Organs: Multiple hepatic lesions most consistent with metastatic disease. The largest lesion near the diaphragmatic surface of the liver measures 3.9 x 5.6 cm. Gallbladder is partially contracted. No biliary dilatation. Spleen, pancreas and adrenal glands are unremarkable. No evidence of obstructive uropathy. GI/Bowel: There is suggestion of an annular mass in the distal transverse colon concerning for underlying malignancy. No evidence of obstruction or significant inflammatory changes. Scattered colonic diverticula with no acute features. No evidence of appendicitis. No small bowel distension. Stomach and duodenal sweep are unremarkable. Small hiatal hernia. Pelvis: Trace free pelvic fluid, likely physiologic. The uterus and adnexal structures are unremarkable. Multiple calcified pelvic phleboliths. The bladder is contracted. Peritoneum/Retroperitoneum: The abdominal aorta is normal in caliber. No pathologic retroperitoneal adenopathy or upper abdominal ascites. Mesenteric adenopathy adjacent to the lesion in the transverse colon with the largest node measuring 11 x 12 mm. Bones/Soft Tissues: No acute osseous or soft tissue abnormality. Small fat containing umbilical hernia. 1. Multiple hepatic lesions most consistent with metastatic disease. Findings concerning for an annular mass in the distal transverse colon, likely a colonic primary with adjacent adenopathy. 2. No other acute findings within the abdomen or pelvis.      CT ABDOMEN W CONTRAST Additional Contrast? Radiologist Recommendation    Result Date: 4/11/2022  EXAMINATION: CT ABDOMEN WITH CONTRAST 4/11/2022 9:27 am TECHNIQUE: CT of the abdomen was performed with the administration of intravenous contrast. Multiplanar reformatted images are provided for review. Dose modulation, iterative reconstruction, and/or weight based adjustment of the mA/kV was utilized to reduce the radiation dose to as low as reasonably achievable. COMPARISON: 04/09/2022 HISTORY: ORDERING SYSTEM PROVIDED HISTORY: triple phase CT of the liver to assess liver masses TECHNOLOGIST PROVIDED HISTORY: Reason for exam:->triple phase CT of the liver to assess liver masses Additional Contrast?->Radiologist Recommendation Reason for Exam: triple phase CT of the liver to assess liver masses, patient had w/o scan 4/9 FINDINGS: Lower Chest: There is no consolidation or effusion. There is mild circumferential wall thickening involving the distal esophagus just above the GE junction with thickness approaching 8 mm. There is a small 1.3 cm necrotic lymph node within the gastrohepatic ligament. Organs: There are multiple low-attenuation soft tissue masses scattered throughout the liver. The largest is within the left hepatic dome and measures 6 by 4 cm. There is a small 13 mm cystic lesion within the pancreatic tail with Hounsfield units of 16. The remainder of the solid abdominal organs are unremarkable. GI/Bowel: There is no bowel dilatation, wall thickening or obstruction. Peritoneum/Retroperitoneum: There is no free air, free fluid or intraperitoneal inflammatory change. Bones/Soft Tissues: There is no acute fracture or aggressive osseous lesion. 1. Disseminated hepatic metastatic disease. 2. Nonspecific distal esophageal wall thickening. Endoscopy recommended to further evaluate. Neoplasm is a diagnostic consideration.  RECOMMENDATIONS: Unavailable     XR CHEST PORTABLE    Result Date: 4/13/2022  EXAM: XR Chest, 1 View EXAM DATE/TIME: 4/13/2022 5:02 pm CLINICAL HISTORY: ORDERING SYSTEM PROVIDED  s/p port insertion  TECHNOLOGIST PROVIDED HISTORY: Reason for exam:->s/p port insertion  Reason for Exam: s/p port insertion TECHNIQUE: Frontal view of the chest. COMPARISON: 12/01/2014 FINDINGS: Lungs:  No acute findings. No consolidation. Pleural space:  No acute findings. No pneumothorax. Heart:  No acute findings. No cardiomegaly. Mediastinum:  No acute findings. Bones/joints:  No acute findings. Tubes, lines and devices:  Left-sided chest port has been placed via the left internal jugular vein with the tip in the lower right atrium. A loop of the catheter extends cephalad in the neck and is not included on this study and therefore abnormalities of the catheter in this area, such as kinking cannot be excluded. 1.  Left-sided chest port has been placed via the left internal jugular vein with the tip in the lower right atrium. A loop of the catheter extends cephalad in the neck and is not included on this study and therefore abnormalities of the catheter in this area, such as kinking cannot be excluded. No evidence of pneumothorax. 2.  Otherwise no acute pulmonary disease or significant change noted. [facility] VASCULAR ACCESS GUIDANCE    Result Date: 4/13/2022  Radiology exam is complete. No Radiologist dictation. Please follow up with ordering provider.      Colonoscopy    Result Date: 4/11/2022  No dictation       Problem List  Patient Active Problem List   Diagnosis    Lower GI bleed    Trichimoniasis    Liver lesion    Colonic mass    Rectal bleeding    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 [facility])       ASSESSMENT AND PLAN    Stage iv colon ca  She has an outpt appt  More or less I was checking on progress   I will watch for dc     If she leaves soon will move it up -  caris ordered   Will need outpt genetics  Staging complete      Ana Maria Horn MD, MD

## 2022-04-18 NOTE — PROGRESS NOTES
Hospitalist Progress Note      PCP: No primary care provider on file. Date of Admission: 4/9/2022    Chief Complaint: abdominal pain, and bleeding per rectum. Hospital Course:   50-year-old with a past medical history of asthma, presented to emergency with generalized abdominal pain for the past 4 to 6 weeks as well as bleeding per rectum, patient also reported dysuria. Patient was found to have stage IV colon cancer, s/p transverse colectomy with end colostomy 4/13. Subjective:   Ongoing colostomy teach, improved symptoms however continues to report poor oral intake    Medications:  Reviewed    Infusion Medications    lactated ringers 100 mL/hr at 04/17/22 2049    sodium chloride       Scheduled Medications    ketorolac  15 mg IntraVENous Q6H    polyethylene glycol  17 g Oral Daily    acetaminophen  1,000 mg Oral Q8H    docusate sodium  100 mg Oral BID    pantoprazole  40 mg Oral QAM AC    sodium chloride flush  5-40 mL IntraVENous 2 times per day     PRN Meds: oxyCODONE **OR** oxyCODONE, HYDROmorphone **OR** HYDROmorphone, promethazine, sodium chloride flush, sodium chloride, ondansetron **OR** ondansetron, acetaminophen **OR** acetaminophen, dicyclomine      Intake/Output Summary (Last 24 hours) at 4/18/2022 1505  Last data filed at 4/18/2022 1358  Gross per 24 hour   Intake 2236.96 ml   Output 600 ml   Net 1636.96 ml       Physical Exam Performed:    BP (!) 145/72   Pulse 53   Temp 98.1 °F (36.7 °C) (Oral)   Resp 18   Ht 5' 6\" (1.676 m)   Wt 253 lb 12 oz (115.1 kg)   LMP 04/10/2022   SpO2 97%   BMI 40.96 kg/m²     General appearance: No apparent distress, appears stated age and cooperative. HEENT: Pupils equal, round, and reactive to light. Conjunctivae/corneas clear. Neck: Supple, with full range of motion. No jugular venous distention. Trachea midline. Respiratory:  Normal respiratory effort.  Clear to auscultation, bilaterally without Rales/Wheezes/Rhonchi. Cardiovascular: Regular rate and rhythm with normal S1/S2 without murmurs, rubs or gallops. Abdomen: Functioning colostomy, abdominal tenderness noted on the left lower zone. Musculoskeletal: No clubbing, cyanosis or edema bilaterally. Full range of motion without deformity. Skin: Skin color, texture, turgor normal.  No rashes or lesions. Neurologic:  Neurovascularly intact without any focal sensory/motor deficits. Cranial nerves: II-XII intact, grossly non-focal.  Psychiatric: Alert and oriented, thought content appropriate, normal insight  Capillary Refill: Brisk,3 seconds, normal   Peripheral Pulses: +2 palpable, equal bilaterally       Labs:   Recent Labs     04/16/22  0615   WBC 7.9   HGB 9.1*   HCT 26.7*        Recent Labs     04/16/22  0615      K 3.6      CO2 21   BUN 12   CREATININE 0.6   CALCIUM 8.1*     No results for input(s): AST, ALT, BILIDIR, BILITOT, ALKPHOS in the last 72 hours. No results for input(s): INR in the last 72 hours. No results for input(s): [employer] in the last 72 hours. Urinalysis:      Lab Results   Component Value Date    NITRU Negative 04/09/2022    WBCUA 21-50 04/09/2022    BACTERIA 2+ 04/09/2022    RBCUA 3-4 04/09/2022    BLOODU SMALL 04/09/2022    SPECGRAV 1.020 04/09/2022    GLUCOSEU Negative 04/09/2022       Radiology:  CT CHEST WO CONTRAST   Final Result   1. Trace bilateral pleural effusions. 2. Unchanged diffuse hepatic metastases. XR CHEST PORTABLE   Final Result      1. Left-sided chest port has been placed via the left internal jugular vein   with the tip in the lower right atrium. A loop of the catheter extends   cephalad in the neck and is not included on this study and therefore   abnormalities of the catheter in this area, such as kinking cannot be   excluded. No evidence of pneumothorax. 2.  Otherwise no acute pulmonary disease or significant change noted.          [facility] VASCULAR ACCESS GUIDANCE   Final Result      CT ABDOMEN W CONTRAST Additional Contrast? Radiologist Recommendation   Final Result   1. Disseminated hepatic metastatic disease. 2. Nonspecific distal esophageal wall thickening. Endoscopy recommended to   further evaluate. Neoplasm is a diagnostic consideration. RECOMMENDATIONS:   Unavailable         CT ABDOMEN PELVIS WO CONTRAST Additional Contrast? None   Final Result   1. Multiple hepatic lesions most consistent with metastatic disease. Findings concerning for an annular mass in the distal transverse colon,   likely a colonic primary with adjacent adenopathy. 2. No other acute findings within the abdomen or pelvis. Assessment/Plan:    Active Hospital Problems    Diagnosis     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 ([facility] [address] [C18.9, C78.7]     Rectal bleeding [K62.5]     Lower GI bleed [K92.2]     Trichimoniasis [A59.9]     Liver lesion [K76.9]     Colonic mass [K63.89]      Stage IV metastatic colon cancer. S/p transverse colectomy and colostomy creation 4/13. Patient presented with abdominal pain and bleeding per rectum, was found to have colon cancer on biopsy 4/11, underwent surgery 4/13 with transverse colectomy and colostomy creation. Abdominal pain improving, input continues to be poor  Plan.  -Agree with MiraLAX  -Continue colostomy teaching.  -Continue pain management  -PT for increased ambulation  -Appreciate general surgery and oncology input.  -Continue LR for now in the improvement appetite    Anemia. Iron deficiency, received Venofer. Trichomonas. To be followed by OB/GYN as outpatient. Continue doxycycline and Flagyl    Intermittent urinary retention. Patient was noted to have some urine retention, and required one-time straight cath. We will continue to bladder scan, straight cath if residual urine more than 500 mL.      Social.  Letter written and faxed, social work help appreciated    DVT Prophylaxis: SCDs  Diet: ADULT DIET; Regular  ADULT ORAL NUTRITION SUPPLEMENT; Breakfast, Lunch; Wound Healing Oral Supplement  Code Status: Full Code    PT/OT Eval Status: ambulatory     Dispo - pending clinical improvement ( 2-3 days).        Arnoldo Chris MD

## 2022-04-18 NOTE — PROGRESS NOTES
Occupational Therapy   Occupational Therapy Initial Assessment  Date: 2022   Patient Name: Adrianne Shultz  MRN: [id number]     : 1980    Date of Service: 2022    Discharge Recommendations:  24 hour supervision or assist  OT Equipment Recommendations  Equipment Needed: Yes  Mobility Devices: ADL Assistive Devices  ADL Assistive Devices: Shower Chair with back  Other: Pt would benefit from shower chair to promote safety and energy conservation while bathing  Adrianne Shultz scored a 21/24 on the [facility] ADL Inpatient form. At this time, no further OT is recommended upon discharge due to pt having adequate support at home. Recommend patient returns to prior setting with prior services. Assessment   Performance deficits / Impairments: Decreased functional mobility ; Decreased ADL status; Decreased high-level IADLs;Decreased endurance  Assessment: Pt is a 35 yo F admitted with generalized abdominal pain and bleeding per rectum; found to have stage IV colon cancer and is s/o transverse colectomy with end colostomy . PTA, pt lives in a house w/ her children where she was completely independent and working FT. She is below baseline at this time, most limited by pain and decreased activity tolerance. She required SBA/CGA for fxl transfers and mobility, used IV pole for support and also required HHA initially. She completed standing grooming w/ SBA, may require up to min A for full ADL d/t pain. Will continue to see on acute in order to address the above deficits and maximize pt's functional performance.  Anticipate pt will be safe to return home w/ initial / supervision, reports her adult dtr is able to assist.  Treatment Diagnosis: Decreased: ADLs, fxl transfers/mobility  Prognosis: Good  Decision Making: Medium Complexity  OT Education: OT Role;Plan of Care;ADL Adaptive Strategies;Transfer Training  REQUIRES OT FOLLOW UP: Yes  Activity Tolerance  Activity Tolerance: Patient Tolerated treatment well;Patient limited by pain  Safety Devices  Safety Devices in place: Yes  Type of devices: Nurse notified;Call light within reach; Left in chair           Patient Diagnosis(es): The primary encounter diagnosis was Generalized abdominal pain. A diagnosis of Abnormal CT of the abdomen was also pertinent to this visit. has a past medical history of Asthma and Colon cancer ([facility] [address] has a past surgical history that includes Colonoscopy (N/A, 4/11/2022); Colonoscopy (4/11/2022); and colostomy (N/A, 4/13/2022). Treatment Diagnosis: Decreased: ADLs, fxl transfers/mobility      Restrictions  Position Activity Restriction  Other position/activity restrictions: colostomy    Subjective   General  Chart Reviewed: Yes  Patient assessed for rehabilitation services?: Yes  Additional Pertinent Hx: per H&P: \"Patient presented to [facility] emergency department today for evaluation of generalized abdominal pain, onset 4-6 weeks ago. She is experienced heartburn. She had nausea and vomiting this past Thursday. Pain in the abdomen is becoming sharp and crampy. Positive for dysuria. No known fever or chills. In addition has noticed blood in stool. Denies weight loss, night sweats, fevers or chills. Denies any known family history of colon cancer, living cancer. Has never had a colonoscopy Patient denies any vaginal discharge or prior history of STI. Wet prep positive for clue cells, urinalysis positive for trichomonas. GC pending. Pregnancy negative. Urine micro WBC 21-50, 2+ bacteriaHemoccult positive. CBC: Leukocytosis 12.8 with a left shift of 9.2. Metabolic panel fairly unremarkable. CT of the abdomen and pelvis indicating multiple lesions within the liver and there is also a transverse colon mass noted. Patient was started on Rocephin at admission was requested. In addition a Covid test was obtained. Patient had T-max 100.2. No tachycardia, no hypotension and no respiratory distress.   No oxygen demand (d/t pain), SBA toileting based on ROM, strength, endurance this session  Tone RUE  RUE Tone: Normotonic  Tone LUE  LUE Tone: Normotonic  Coordination  Movements Are Fluid And Coordinated: Yes     Bed mobility  Supine to Sit: Stand by assistance  Sit to Supine: Unable to assess (in recliner at end of session)  Comment: effortful; painful  Transfers  Sit to stand: Stand by assistance;Contact guard assistance  Stand to sit: Stand by assistance     Cognition  Overall Cognitive Status: WFL          LUE AROM (degrees)  LUE AROM : WFL  RUE AROM (degrees)  RUE AROM : WFL  LUE Strength  Gross LUE Strength: WFL  RUE Strength  Gross RUE Strength: WFL         Plan   Plan  Times per week: 3-5  Times per day: Daily  Current Treatment Recommendations: Strengthening,Endurance Training,ROM,Balance Training,Functional Mobility Training,Safety Education & Training,Self-Care / ADL    [facility] Score  [facility] Inpatient Daily Activity Raw Score: 21 (04/18/22 0958)  AM-[facility] Inpatient ADL T-Scale Score : 44.27 (04/18/22 0958)  ADL Inpatient CMS 0-100% Score: 32.79 (04/18/22 4639)  ADL Inpatient CMS G-Code Modifier : CJ (04/18/22 9529)    Goals  Short term goals  Time Frame for Short term goals: Prior to d/c  Short term goal 1: Pt will complete fxl transfers mod I  Short term goal 2: Pt will groom sinkside mod I  Short term goal 3: Pt will toilet mod I  Short term goal 4: Pt will bathe w/ supervision  Short term goal 5: Pt will complete LB dressing w/ supervision  Long term goals  Time Frame for Long term goals : LTG=STG  Patient Goals   Patient goals : Did not state       Therapy Time   Individual Concurrent Group Co-treatment   Time In 0920         Time Out 0950         Minutes 30         Timed Code Treatment Minutes: 15 Minutes (15 min eval)       Paulina Curry OTR/L [id number]

## 2022-04-19 VITALS
OXYGEN SATURATION: 95 % | DIASTOLIC BLOOD PRESSURE: 97 MMHG | HEART RATE: 74 BPM | TEMPERATURE: 98.1 F | HEIGHT: 66 IN | SYSTOLIC BLOOD PRESSURE: 169 MMHG | RESPIRATION RATE: 18 BRPM | WEIGHT: 227.96 LBS | BODY MASS INDEX: 36.64 KG/M2

## 2022-04-19 PROCEDURE — 6360000002 HC RX W HCPCS: Performed by: SURGERY

## 2022-04-19 PROCEDURE — 6370000000 HC RX 637 (ALT 250 FOR IP): Performed by: SURGERY

## 2022-04-19 PROCEDURE — APPNB45 APP NON BILLABLE 31-45 MINUTES: Performed by: PHYSICIAN ASSISTANT

## 2022-04-19 PROCEDURE — 99024 POSTOP FOLLOW-UP VISIT: CPT | Performed by: PHYSICIAN ASSISTANT

## 2022-04-19 PROCEDURE — 2580000003 HC RX 258: Performed by: SURGERY

## 2022-04-19 PROCEDURE — APPNB45 APP NON BILLABLE 31-45 MINUTES: Performed by: NURSE PRACTITIONER

## 2022-04-19 PROCEDURE — APPSS45 APP SPLIT SHARED TIME 31-45 MINUTES: Performed by: PHYSICIAN ASSISTANT

## 2022-04-19 PROCEDURE — 99024 POSTOP FOLLOW-UP VISIT: CPT | Performed by: NURSE PRACTITIONER

## 2022-04-19 PROCEDURE — 99024 POSTOP FOLLOW-UP VISIT: CPT | Performed by: SURGERY

## 2022-04-19 RX ORDER — DOCUSATE SODIUM 100 MG/1
100 CAPSULE, LIQUID FILLED ORAL 2 TIMES DAILY
Qty: 28 CAPSULE | Refills: 0 | Status: SHIPPED | OUTPATIENT
Start: 2022-04-19 | End: 2022-05-03

## 2022-04-19 RX ORDER — DICYCLOMINE HYDROCHLORIDE 10 MG/1
20 CAPSULE ORAL EVERY 6 HOURS PRN
Qty: 120 CAPSULE | Refills: 3 | Status: SHIPPED | OUTPATIENT
Start: 2022-04-19

## 2022-04-19 RX ORDER — OXYCODONE HYDROCHLORIDE 5 MG/1
5 TABLET ORAL EVERY 6 HOURS PRN
Qty: 15 TABLET | Refills: 0 | Status: ON HOLD
Start: 2022-04-19 | End: 2022-04-29 | Stop reason: HOSPADM

## 2022-04-19 RX ORDER — POLYETHYLENE GLYCOL 3350 17 G/17G
17 POWDER, FOR SOLUTION ORAL DAILY
Qty: 527 G | Refills: 1 | Status: SHIPPED | OUTPATIENT
Start: 2022-04-20 | End: 2022-05-20

## 2022-04-19 RX ORDER — PANTOPRAZOLE SODIUM 40 MG/1
40 TABLET, DELAYED RELEASE ORAL
Qty: 30 TABLET | Refills: 3 | Status: SHIPPED | OUTPATIENT
Start: 2022-04-20

## 2022-04-19 RX ORDER — ONDANSETRON 4 MG/1
4 TABLET, ORALLY DISINTEGRATING ORAL EVERY 8 HOURS PRN
Qty: 30 TABLET | Refills: 0 | Status: SHIPPED | OUTPATIENT
Start: 2022-04-19 | End: 2022-05-03

## 2022-04-19 RX ADMIN — ACETAMINOPHEN 1000 MG: 325 TABLET ORAL at 12:23

## 2022-04-19 RX ADMIN — ACETAMINOPHEN 1000 MG: 325 TABLET ORAL at 05:36

## 2022-04-19 RX ADMIN — PANTOPRAZOLE SODIUM 40 MG: 40 TABLET, DELAYED RELEASE ORAL at 05:36

## 2022-04-19 RX ADMIN — ONDANSETRON 4 MG: 2 INJECTION INTRAMUSCULAR; INTRAVENOUS at 05:36

## 2022-04-19 RX ADMIN — SODIUM CHLORIDE, POTASSIUM CHLORIDE, SODIUM LACTATE AND CALCIUM CHLORIDE: 600; 310; 30; 20 INJECTION, SOLUTION INTRAVENOUS at 05:36

## 2022-04-19 RX ADMIN — DOCUSATE SODIUM 100 MG: 100 CAPSULE, LIQUID FILLED ORAL at 08:51

## 2022-04-19 RX ADMIN — KETOROLAC TROMETHAMINE 15 MG: 15 INJECTION, SOLUTION INTRAMUSCULAR; INTRAVENOUS at 05:36

## 2022-04-19 ASSESSMENT — PAIN SCALES - GENERAL
PAINLEVEL_OUTOF10: 5
PAINLEVEL_OUTOF10: 4
PAINLEVEL_OUTOF10: 0

## 2022-04-19 ASSESSMENT — PAIN DESCRIPTION - DESCRIPTORS: DESCRIPTORS: DISCOMFORT

## 2022-04-19 ASSESSMENT — PAIN DESCRIPTION - LOCATION: LOCATION: ABDOMEN

## 2022-04-19 ASSESSMENT — PAIN DESCRIPTION - PAIN TYPE: TYPE: SURGICAL PAIN

## 2022-04-19 NOTE — DISCHARGE INSTR - DIET

## 2022-04-19 NOTE — DISCHARGE SUMMARY
Hospital Medicine Discharge Summary    Patient ID: Eugenia Sykes      Patient's PCP: No primary care provider on file. 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 Hospital Course:     79-year-old with a past medical history of asthma, presented to emergency with generalized abdominal pain for the past 4 to 6 weeks as well as bleeding per rectum, patient also reported dysuria.     Patient was found to have stage IV colon cancer, s/p transverse colectomy with end colostomy 4/13. Stage IV metastatic colon cancer. S/p transverse colectomy and colostomy creation 4/13. Patient presented with abdominal pain and bleeding per rectum, was found to have colon cancer on biopsy 4/11, underwent surgery 4/13 with transverse colectomy and colostomy creation.     Abdominal pain improving, input continues to be poor  Colostomy teaching done .        Anemia. Iron deficiency, received Venofer.     Trichomonas. To be followed by OB/GYN as outpatient. Continue doxycycline and Flagyl     Intermittent urinary retention. Patient was noted to have some urine retention, and required one-time straight cath.   We will continue to bladder scan, straight cath if residual urine more than 500 mL.      Social.  Letter written and faxed, social work help appreciated    Physical Exam Performed:     BP (!) 169/97   Pulse 74   Temp 98.1 °F (36.7 °C) (Oral)   Resp 18   Ht 5' 6\" (1.676 m)   Wt 227 lb 15.3 oz (103.4 kg)   LMP 04/10/2022   SpO2 95%   BMI 36.79 kg/m²     General appearance: No apparent distress, appears stated age and cooperative. HEENT: Pupils equal, round, and reactive to light. Conjunctivae/corneas clear. Neck: Supple, with full range of motion. No jugular venous distention. Trachea midline. Respiratory:  Normal respiratory effort. Clear to auscultation, bilaterally without Rales/Wheezes/Rhonchi. Cardiovascular: Regular rate and rhythm with normal S1/S2 without murmurs, rubs or gallops. Abdomen: Functioning colostomy, abdominal tenderness noted on the left lower zone. Musculoskeletal: No clubbing, cyanosis or edema bilaterally. Full range of motion without deformity. Skin: Skin color, texture, turgor normal.  No rashes or lesions. Neurologic:  Neurovascularly intact without any focal sensory/motor deficits. Cranial nerves: II-XII intact, grossly non-focal.  Psychiatric: Alert and oriented, thought content appropriate, normal insight  Capillary Refill: Brisk,3 seconds, normal   Peripheral Pulses: +2 palpable, equal bilaterally     Labs: For convenience and continuity at follow-up the following most recent labs are provided:      CBC:    Lab Results   Component Value Date    WBC 7.9 04/16/2022    HGB 9.1 04/16/2022    HCT 26.7 04/16/2022     04/16/2022       Renal:    Lab Results   Component Value Date     04/16/2022    K 3.6 04/16/2022     04/16/2022    CO2 21 04/16/2022    BUN 12 04/16/2022    CREATININE 0.6 04/16/2022    CALCIUM 8.1 04/16/2022         Significant Diagnostic Studies    Radiology:   CT CHEST WO CONTRAST   Final Result   1. Trace bilateral pleural effusions. 2. Unchanged diffuse hepatic metastases. XR CHEST PORTABLE   Final Result      1. Left-sided chest port has been placed via the left internal jugular vein   with the tip in the lower right atrium.   A loop of the catheter extends   cephalad in the neck and is not included on this study and therefore   abnormalities of the catheter in this area, such as kinking cannot be   excluded. No evidence of pneumothorax. 2.  Otherwise no acute pulmonary disease or significant change noted. FL VASCULAR ACCESS GUIDANCE   Final Result      CT ABDOMEN W CONTRAST Additional Contrast? Radiologist Recommendation   Final Result   1. Disseminated hepatic metastatic disease. 2. Nonspecific distal esophageal wall thickening. Endoscopy recommended to   further evaluate. Neoplasm is a diagnostic consideration. RECOMMENDATIONS:   Unavailable         CT ABDOMEN PELVIS WO CONTRAST Additional Contrast? None   Final Result   1. Multiple hepatic lesions most consistent with metastatic disease. Findings concerning for an annular mass in the distal transverse colon,   likely a colonic primary with adjacent adenopathy. 2. No other acute findings within the abdomen or pelvis. Consults:     IP CONSULT TO GI  IP CONSULT TO ONCOLOGY  IP CONSULT TO COLORECTAL SURGERY  IP CONSULT TO GENERAL SURGERY  IP CONSULT TO HOME CARE NEEDS    Disposition:  home     Condition at Discharge: Stable    Discharge Instructions/Follow-up:    - follow up with oncology.    - continue on laxatives and antiemetics    Code Status:  Full Code     Activity: activity as tolerated    Diet: regular diet      Discharge Medications:     Current Discharge Medication List           Details   docusate sodium (COLACE) 100 MG capsule Take 1 capsule by mouth 2 times daily for 14 days  Qty: 28 capsule, Refills: 0      pantoprazole (PROTONIX) 40 MG tablet Take 1 tablet by mouth every morning (before breakfast)  Qty: 30 tablet, Refills: 3      polyethylene glycol (GLYCOLAX) 17 g packet Take 17 g by mouth daily  Qty: 527 g, Refills: 1              Details   dicyclomine (BENTYL) 10 MG capsule Take 2 capsules by mouth every 6 hours as needed (cramps)  Qty: 120 capsule, Refills: 3      ondansetron (ZOFRAN-ODT) 4 MG disintegrating tablet Take 1 tablet by mouth every 8 hours as needed for Nausea or Vomiting  Qty: 30 tablet, Refills: 0             Time Spent on discharge is more than 30 minutes in the examination, evaluation, counseling and review of medications and discharge plan. Signed:    Ollie Mar MD   4/19/2022      Thank you No primary care provider on file. for the opportunity to be involved in this patient's care. If you have any questions or concerns please feel free to contact me at [phone].

## 2022-04-19 NOTE — PLAN OF CARE
Problem: Pain:  Goal: Pain level will decrease  Description: Pain level will decrease  4/19/2022 0105 by Greg Nava RN  Outcome: Ongoing  4/19/2022 0104 by Greg Nava RN  Outcome: Ongoing  4/18/2022 1612 by Mariza Reynolds RN  Outcome: Ongoing  Goal: Control of acute pain  Description: Control of acute pain  4/19/2022 0105 by Greg Nava RN  Outcome: Ongoing  4/19/2022 0104 by Greg Nava RN  Outcome: Ongoing  4/18/2022 1612 by Mariza Reynolds RN  Outcome: Ongoing  Goal: Control of chronic pain  Description: Control of chronic pain  4/19/2022 0105 by Greg Nava RN  Outcome: Ongoing  4/19/2022 0104 by Greg Nava RN  Outcome: Ongoing  4/18/2022 1612 by Mariza Reynolds RN  Outcome: Ongoing   Pain/discomfort being managed with PRN analgesics per MD orders. Pt able to express presence and absence of pain and rate pain appropriately using numerical scale. Problem: Safety:  Goal: Free from accidental physical injury  Description: Free from accidental physical injury  4/19/2022 0105 by Greg Nava RN  Outcome: Ongoing  4/19/2022 0104 by Greg Nava RN  Outcome: Ongoing  4/18/2022 1612 by Macrina Freeman RN  Outcome: Ongoing  Goal: Free from intentional harm  Description: Free from intentional harm  4/19/2022 0105 by Greg Nava RN  Outcome: Ongoing  4/19/2022 0104 by Greg Nava RN  Outcome: Ongoing  4/18/2022 1612 by Macrina Freeman RN  Outcome: Ongoing   Patient uses call light appropriately to express needs. Bed to lowest position with door open and call light in reach. All fall precautions implemented at this time. Siderails up x2. Non skid footwear in place. Patient has had no falls this shift. Will continue to monitor.     Problem: Daily Care:  Goal: Daily care needs are met  Description: Daily care needs are met  4/19/2022 0105 by Greg Nava RN  Outcome: Ongoing  4/19/2022 0104 by Greg Nava RN  Outcome: Ongoing  4/18/2022 1612 by Mariza Reynolds RN  Outcome: Ongoing Problem: Discharge Planning:  Goal: Patients continuum of care needs are met  Description: Patients continuum of care needs are met  4/19/2022 0105 by Dee Sánchez RN  Outcome: Ongoing  4/19/2022 0104 by Dee Sánchez RN  Outcome: Ongoing  4/18/2022 1612 by Macrina Freeman RN  Outcome: Ongoing     Problem: Nutrition  Goal: Optimal nutrition therapy  4/19/2022 0105 by Dee Sánchez RN  Outcome: Ongoing  4/19/2022 0104 by Dee Sánchez RN  Outcome: Ongoing  4/18/2022 1612 by Macrina Freeman RN  Outcome: Ongoing     Problem: Falls - Risk of:  Goal: Will remain free from falls  Description: Will remain free from falls  4/19/2022 0105 by Dee Sánchez RN  Outcome: Ongoing  4/19/2022 0104 by Dee Sánchez RN  Outcome: Ongoing  4/18/2022 1612 by Dylon De La Cruz RN  Outcome: Ongoing  Goal: Absence of physical injury  Description: Absence of physical injury  4/19/2022 0105 by Dee Sánchez RN  Outcome: Ongoing  4/19/2022 0104 by Dee Sánchez RN  Outcome: Ongoing  4/18/2022 1612 by Dylon De La Cruz RN  Outcome: Ongoing   Patient uses call light appropriately to express needs. Bed to lowest position with door open and call light in reach. All fall precautions implemented at this time. Siderails up x2. Non skid footwear in place. Patient has had no falls this shift. Will continue to monitor. Problem: Skin Integrity:  Goal: Will show no infection signs and symptoms  Description: Will show no infection signs and symptoms  4/19/2022 0105 by Dee Sánchez RN  Outcome: Ongoing  4/19/2022 0104 by Dee Sánchez RN  Outcome: Ongoing  4/18/2022 1612 by Dylon De La Cruz RN  Outcome: Ongoing  Goal: Absence of new skin breakdown  Description: Absence of new skin breakdown  4/19/2022 0105 by Dee Sánchez RN  Outcome: Ongoing  4/19/2022 0104 by Dee Sánchez RN  Outcome: Ongoing  4/18/2022 1612 by Macrina Freeman RN  Outcome: Ongoing   Skin care protocal in place. Keep incisions clean and dry.   Assist with colostomy care as needed. Problem: Discharge Planning:  Goal: Discharged to appropriate level of care  Description: Discharged to appropriate level of care  Outcome: Ongoing     Problem: HEMODYNAMIC STATUS  Goal: Patient has stable vital signs and fluid balance  Outcome: Ongoing   Monitor VS every shift and PRN. Monitor labs as ordered. Monitor I/O. Problem: OXYGENATION/RESPIRATORY FUNCTION  Goal: Patient will achieve/maintain normal respiratory rate/effort  Outcome: Ongoing   Pt will maintain absence of respiratory complications. Oxygen saturations >90% at all times with supplemental O2 as needed. Will assess respiratory status every shift and PRN. Encourage to cough and deep breath. Problem: MOBILITY  Goal: Early mobilization is achieved  Outcome: Ongoing   Encourage and assist OOB as tolerated. Problem: ELIMINATION  Goal: Elimination patterns are normal or improving  Description: Elimination patterns return to pre-surgery normal patterns  Outcome: Ongoing   Monitor bowel status. Teach and assist with colostomy as needed.

## 2022-04-19 NOTE — PLAN OF CARE
Problem: Pain:  Description: Pain management should include both nonpharmacologic and pharmacologic interventions. Goal: Pain level will decrease  Description: Pain level will decrease  4/19/2022 1052 by Denise Espinoza RN  Outcome: Completed  4/19/2022 0854 by Denise Espinoza RN  Outcome: Ongoing  Note: Pain/discomfort being managed with PRN analgesics per MD order. Pt able to express presence and absence of pain and rate pain appropriately using numerical scale    4/19/2022 0105 by Jonelle Forman RN  Outcome: Ongoing  4/19/2022 0104 by Jonelle Forman RN  Outcome: Ongoing  Goal: Control of acute pain  Description: Control of acute pain  4/19/2022 1052 by Denise Espinoza RN  Outcome: Completed  4/19/2022 0105 by Jonelle Forman RN  Outcome: Ongoing  4/19/2022 0104 by Jonelle Forman RN  Outcome: Ongoing  Goal: Control of chronic pain  Description: Control of chronic pain  4/19/2022 1052 by Denise Espinoza RN  Outcome: Completed  4/19/2022 0105 by Jonelle Forman RN  Outcome: Ongoing  4/19/2022 0104 by Jonelle Forman RN  Outcome: Ongoing     Problem: Safety:  Goal: Free from accidental physical injury  Description: Free from accidental physical injury  4/19/2022 1052 by Denise Espinoza RN  Outcome: Completed  4/19/2022 0854 by Denise Espinoza RN  Outcome: Ongoing  Note: Pt A&O aware of her abilities. Pt understands and knows to call when in need of ambulation. Measures were made to prevent accidental needle stick, friction, shear, etc. Environment kept free of clutter and adequate lighting provided. Will continue to monitor for physical injury.     4/19/2022 0105 by Jonelle Forman RN  Outcome: Ongoing  4/19/2022 0104 by Jonelle Forman RN  Outcome: Ongoing  Goal: Free from intentional harm  Description: Free from intentional harm  4/19/2022 1052 by Denise Espinoza RN  Outcome: Completed  4/19/2022 0105 by Jonelle Forman RN  Outcome: Ongoing  4/19/2022 0104 by Jonelle Forman RN  Outcome: Ongoing     Problem: Daily Care:  Goal: Daily care needs are met  Description: Daily care needs are met  4/19/2022 1052 by Ulysses Devine RN  Outcome: Completed  4/19/2022 0854 by Ulysses Devine RN  Outcome: Ongoing  Note: In collaboration with the healthcare team, the patient's daily care needs are meet. Patient is encouraged to perform tasks independently per ability. 4/19/2022 0105 by Jaiden Hubbard RN  Outcome: Ongoing  4/19/2022 0104 by Jaiden Hubbard RN  Outcome: Ongoing     Problem: Discharge Planning:  Goal: Patients continuum of care needs are met  Description: Patients continuum of care needs are met  4/19/2022 1052 by Ulysses Devine RN  Outcome: Completed  4/19/2022 0854 by Ulysses Devine RN  Outcome: Ongoing  Note: Continuing to work with patient and health care team on discharge plan. Discharge instructions and medication management will be reviewed prior to discharge. 4/19/2022 0105 by Jaiden Hubbard RN  Outcome: Ongoing  4/19/2022 0104 by Jaiden Hubbard RN  Outcome: Ongoing     Problem: Nutrition  Goal: Optimal nutrition therapy  4/19/2022 1052 by Ulysses Devine RN  Outcome: Completed  4/19/2022 0105 by Jaiden Hubbard RN  Outcome: Ongoing  4/19/2022 0104 by Jaiden Hubbard RN  Outcome: Ongoing     Problem: Falls - Risk of:  Goal: Will remain free from falls  Description: Will remain free from falls  4/19/2022 1052 by Ulysses Devine RN  Outcome: Completed  4/19/2022 0854 by Ulysses Devine RN  Outcome: Ongoing  Note: Pt free from falls this shift. Fall precautions in place at all times. Call light within reach. Pt able to and agreeable to contact for safety appropriately.     4/19/2022 0105 by Jaiden Hubbard RN  Outcome: Ongoing  4/19/2022 0104 by Jaiden Hubbard RN  Outcome: Ongoing  Goal: Absence of physical injury  Description: Absence of physical injury  4/19/2022 1052 by Ulysses Devine RN  Outcome: Completed  4/19/2022 0105 by Jaiden Hubbard RN  Outcome: Ongoing  4/19/2022 0104 by Jaiden Hubbard RN  Outcome: Ongoing Problem: Skin Integrity:  Goal: Will show no infection signs and symptoms  Description: Will show no infection signs and symptoms  4/19/2022 1052 by Kia Mc RN  Outcome: Completed  4/19/2022 0854 by Kia Mc RN  Outcome: Ongoing  Note: Skin assessment completed every shift. Pt assessed for incontinence, appropriate barrier cream applied prn as needed. Pt encouraged to turn/rotate every 2 hours. Assistance provided if pt unable to do so themselves.      4/19/2022 0105 by Samantha Chung RN  Outcome: Ongoing  4/19/2022 0104 by Samantha Chung RN  Outcome: Ongoing  Goal: Absence of new skin breakdown  Description: Absence of new skin breakdown  4/19/2022 1052 by Kia Mc RN  Outcome: Completed  4/19/2022 0105 by Samantha Chung RN  Outcome: Ongoing  4/19/2022 0104 by Samantha Chung RN  Outcome: Ongoing     Problem: Discharge Planning:  Goal: Discharged to appropriate level of care  Description: Discharged to appropriate level of care  4/19/2022 1052 by Kia Mc RN  Outcome: Completed  4/19/2022 0105 by Samantha Chung RN  Outcome: Ongoing     Problem: HEMODYNAMIC STATUS  Goal: Patient has stable vital signs and fluid balance  4/19/2022 1052 by Kia Mc RN  Outcome: Completed  4/19/2022 0854 by Kia Mc RN  Outcome: Ongoing  4/19/2022 0105 by Samantha Chung RN  Outcome: Ongoing     Problem: OXYGENATION/RESPIRATORY FUNCTION  Goal: Patient will achieve/maintain normal respiratory rate/effort  4/19/2022 1052 by Kia Mc RN  Outcome: Completed  4/19/2022 0854 by Kia Mc RN  Outcome: Ongoing  4/19/2022 0105 by Samantha Chung RN  Outcome: Ongoing     Problem: MOBILITY  Goal: Early mobilization is achieved  4/19/2022 1052 by Kia Mc RN  Outcome: Completed  4/19/2022 0854 by Kia Mc RN  Outcome: Ongoing  4/19/2022 0105 by Samantha Chung RN  Outcome: Ongoing     Problem: ELIMINATION  Goal: Elimination patterns are normal or improving  Description: Elimination patterns return to pre-surgery normal patterns  4/19/2022 1052 by Samuel Rodríguez RN  Outcome: Completed  4/19/2022 0105 by Raquel Gonzalez RN  Outcome: Ongoing

## 2022-04-19 NOTE — PROGRESS NOTES
CLINICAL PHARMACY NOTE: MEDS TO BEDS    Total # of Prescriptions Filled: 5   The following medications were delivered to the patient:  Current Discharge Medication List      START taking these medications    Details   docusate sodium (COLACE) 100 MG capsule Take 1 capsule by mouth 2 times daily for 14 days  Qty: 28 capsule, Refills: 0      pantoprazole (PROTONIX) 40 MG tablet Take 1 tablet by mouth every morning (before breakfast)  Qty: 30 tablet, Refills: 3      polyethylene glycol (GLYCOLAX) 17 g packet Take 17 g by mouth daily  Qty: 527 g, Refills: 1      oxyCODONE (ROXICODONE) 5 MG immediate release tablet Take 1 tablet by mouth every 6 hours as needed for Pain for up to 7 days. Intended supply: 5 days. Take lowest dose possible to manage pain  Qty: 15 tablet, Refills: 0    Comments: Reduce doses taken as pain becomes manageable  Associated Diagnoses: 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 (HCC)         · Dicyclomine 10mg caps  · Ondansetron 4mg ODT tabs  · Did not dispense Oxycodone.  Sent to PT's home pharmacy    Additional Documentation:

## 2022-04-19 NOTE — CARE COORDINATION
Discharge Planning:    Patient requested insurance information be faxed to [facility]. Fax successfully sent 4/19/2022 1000. [facility] [place], [facility]. able to accept patient for outpatient colostomy care. The company could staff a nurse to her home as early as Wednesday, 4/20. Awaiting call back from [facility] with  contact information for patient's son. Will fax dx letter to  once information obtained.     Electronically signed by Kayli Blancas RN on 4/19/2022 at 10:02 AM

## 2022-04-19 NOTE — CARE COORDINATION
CASE MANAGEMENT DISCHARGE SUMMARY:    DISCHARGE DATE: 4/19/2022    DISCHARGED TO: home with family         HOME CARE AGENCY: [address]             PHONE NUMBER: [phone]: family to transport             TIME: TBD by patient and bedside nurse    PREFERRED PHARMACY: [facility]             NUMBER: [phone]    COMMENTS: Patient to discharge home with family. Spoke with Fay Cervatnes with [employer] [address] [place] [address] that all is set up and ready to go for 4/20 start. Patient made aware of [facility] plan and is agreeable. Patient also provided with hard copy letter of medical diagnosis. She is aware she will need to present to son/son's  personally.     Electronically signed by Tiffany Power RN on 4/19/2022 at 12:10 PM

## 2022-04-19 NOTE — PLAN OF CARE
Problem: Pain:  Description: Pain management should include both nonpharmacologic and pharmacologic interventions. Goal: Pain level will decrease  Description: Pain level will decrease  4/19/2022 0854 by Syed Alatorre RN  Outcome: Ongoing  Note: Pain/discomfort being managed with PRN analgesics per MD order. Pt able to express presence and absence of pain and rate pain appropriately using numerical scale       Problem: Safety:  Goal: Free from accidental physical injury  Description: Free from accidental physical injury  4/19/2022 0854 by Syed Alatorre RN  Outcome: Ongoing  Note: Pt A&O aware of her abilities. Pt understands and knows to call when in need of ambulation. Measures were made to prevent accidental needle stick, friction, shear, etc. Environment kept free of clutter and adequate lighting provided. Will continue to monitor for physical injury. Problem: Daily Care:  Goal: Daily care needs are met  Description: Daily care needs are met  4/19/2022 0854 by Syed Alatorre RN  Outcome: Ongoing  Note: In collaboration with the healthcare team, the patient's daily care needs are meet. Patient is encouraged to perform tasks independently per ability. Problem: Discharge Planning:  Goal: Patients continuum of care needs are met  Description: Patients continuum of care needs are met  4/19/2022 0854 by Syed Alatorre RN  Outcome: Ongoing  Note: Continuing to work with patient and health care team on discharge plan. Discharge instructions and medication management will be reviewed prior to discharge. Problem: Falls - Risk of:  Goal: Will remain free from falls  Description: Will remain free from falls  4/19/2022 0854 by Syed Alatorre RN  Outcome: Ongoing  Note: Pt free from falls this shift. Fall precautions in place at all times. Call light within reach. Pt able to and agreeable to contact for safety appropriately.        Problem: Skin Integrity:  Goal: Will show no infection signs and symptoms  Description: Will show no infection signs and symptoms  4/19/2022 0854 by Peter Doss RN  Outcome: Ongoing  Note: Skin assessment completed every shift. Pt assessed for incontinence, appropriate barrier cream applied prn as needed. Pt encouraged to turn/rotate every 2 hours. Assistance provided if pt unable to do so themselves.         Problem: HEMODYNAMIC STATUS  Goal: Patient has stable vital signs and fluid balance  4/19/2022 0854 by Peter Doss RN  Outcome: Ongoing     Problem: OXYGENATION/RESPIRATORY FUNCTION  Goal: Patient will achieve/maintain normal respiratory rate/effort  4/19/2022 0854 by Peter Doss RN  Outcome: Ongoing     Problem: MOBILITY  Goal: Early mobilization is achieved  4/19/2022 0854 by Peter Doss RN  Outcome: Ongoing

## 2022-04-19 NOTE — FLOWSHEET NOTE
Pt transferred off unit in wheelchair. Family to drive patient home.   Electronically signed by Janay Wadsworth RN on 4/19/2022 at 2:41 PM

## 2022-04-19 NOTE — DISCHARGE INSTR - COC
Continuity of Care Form    Patient Name: Cristiane Fung   :  1980  MRN:  [id number]    Admit date:  2022  Discharge date:  22      Code Status Order: Full Code   Advance Directives:   [facility] Documentation       Date/Time Healthcare Directive Type of Healthcare Directive Copy in [address] Agent's Name Healthcare Agent's Phone Number    22 1231 No, patient does not have an advance directive for healthcare treatment -- -- -- -- --            Admitting Physician:  Lupillo Garza MD  PCP: No primary care provider on file. Discharging Nurse: [facility] Unit/Room#: [id number]-3931/5822-96  Discharging Unit Phone Number: [phone]    Emergency Contact:   Extended Emergency Contact Information  Primary Emergency Contact: Marisol Simmonshimelo  Address: [address] [place] [address].  Kailee Cortés, [address] [place] [address]  Home Phone: [phone]  Relation: Other  Secondary Emergency Contact: Monse Garcia  Home Phone: [phone]  Relation: Brother/Sister    Past Surgical History:  Past Surgical History:   Procedure Laterality Date    COLONOSCOPY N/A 2022    COLONOSCOPY WITH BIOPSY performed by Sergio Gomez MD at [address] [place] [address]  2022    COLONOSCOPY SUBMUCOSAL tattoo  INJECTION performed by Sergio Gomez MD at [address] [place] [address] 2022    LAPAROSCOPIC TRANSVERSE COLECTOMY WITH COLOSTOMY, INSERTION OF PORT A CATHETER, LAPAROSCOPIC LIVER BIOPSY performed by Sylvain Kasper MD at [facility]       Immunization History:   Immunization History   Administered Date(s) Administered    COVID-19, [employer] top, DO NOT Dilute, Romeo-Sucrose, 12+ yrs, PF, 30 mcg/0.3 mL dose 2022       Active Problems:  Patient Active Problem List   Diagnosis Code    Lower GI bleed K92.2    Trichimoniasis A59.9    Liver lesion K76.9    Colonic mass K63.89    Rectal bleeding K62.5    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 (HCC) C18.9, C78.7       Isolation/Infection:   Isolation            No Isolation          Patient Infection Status       None to display            Nurse Assessment:  Last Vital Signs: BP (!) 169/97   Pulse 74   Temp 98.1 °F (36.7 °C) (Oral)   Resp 18   Ht 5' 6\" (1.676 m)   Wt 227 lb 15.3 oz (103.4 kg)   LMP 04/10/2022   SpO2 95%   BMI 36.79 kg/m²     Last documented pain score (0-10 scale): Pain Level: 0  Last Weight:   Wt Readings from Last 1 Encounters:   04/19/22 227 lb 15.3 oz (103.4 kg)     Mental Status:  oriented and alert    IV Access:  - left chest wall port de-accessed    Nursing Mobility/ADLs:  Walking   Independent  Transfer  Independent  Bathing  Independent  Dressing  Independent  Toileting  Independent  Feeding  [address]  Independent  Med Delivery   none    Wound Care Documentation and Therapy:        Elimination:  Continence: Bowel: N/A  Bladder: Yes  Urinary Catheter: None   Colostomy/Ileostomy/Ileal Conduit: Yes  Colostomy LUQ Transverse-Stomal Appliance: 2 piece  Colostomy LUQ Transverse-Stoma  Assessment: Protrudes,Pink,Moist  Colostomy LUQ Transverse-Mucocutaneous Junction: Intact  Colostomy LUQ Transverse-Peristomal Assessment: Clean,Intact  Colostomy LUQ Transverse-Treatment: Bag change (barrier ring)  Colostomy LUQ Transverse-Stool Appearance: Watery  Colostomy LUQ Transverse-Stool Color: Brown  Colostomy LUQ Transverse-Stool Amount: Medium  Colostomy LUQ Transverse-Output (mL): 0 ml    Date of Last BM:     Intake/Output Summary (Last 24 hours) at 4/19/2022 1049  Last data filed at 4/19/2022 0907  Gross per 24 hour   Intake 880 ml   Output 500 ml   Net 380 ml     I/O last 3 completed shifts:   In: 400 [P.O.:400]  Out: 500 [Urine:400; Stool:100]    Safety Concerns:     None    Impairments/Disabilities:      None    Nutrition Therapy:  Current Nutrition Therapy:   - Oral Diet:  General    Routes of Feeding: Oral  Liquids: No Restrictions  Daily Fluid Restriction: no  Last Modified Barium Swallow with Video (Video Swallowing Test): not done    Treatments at the Time of Hospital Discharge:   Respiratory Treatments: none  Oxygen Therapy:  is not on home oxygen therapy. Ventilator:    - No ventilator support    Rehab Therapies: none  Weight Bearing Status/Restrictions: No weight bearing restrictions  Other Medical Equipment (for information only, NOT a DME order): Other Treatments:     Patient's personal belongings (please select all that are sent with patient):  None    RN SIGNATURE:  Electronically signed by Hayley Jimenez RN on 4/19/22 at 11:16 AM EDT    CASE MANAGEMENT/SOCIAL WORK SECTION    Inpatient Status Date: 4/9/2022    Readmission Risk Assessment Score:  Readmission Risk              Risk of Unplanned Readmission:  9           Discharging to Facility/ Agency   Name: [facility]  Address:   [address] [place] [address] [place]   Phone:  [phone]  Fax:  [phone]     / signature: Electronically signed by Yamil Martinez RN on 4/19/22 at 12:14 PM EDT    PHYSICIAN SECTION    Prognosis: Good    Condition at Discharge: Stable    Rehab Potential (if transferring to Rehab): Good    Recommended Labs or Other Treatments After Discharge:   - colostomy care and visiting RN    Physician Certification: I certify the above information and transfer of Claudeen Cordoba  is necessary for the continuing treatment of the diagnosis listed and that she requires Home Care for greater 30 days.      Update Admission H&P: No change in H&P    PHYSICIAN SIGNATURE:  Electronically signed by Love Leonardo MD on 4/19/22 at 10:50 AM EDT

## 2022-04-19 NOTE — PROGRESS NOTES
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  -Tolerating regular diet. Tolerating more PO. Some nausea, but improved and controlled with medication  -+gas and stool in ostomy appliance  -Daily miralax with history chronic constipation  -Up ambulating in room this AM without difficulty  -Path discussed: invasive adenocarcinoma, + lymph nodes (3/12)  -OK to D/C home  -F/U with Dr. Lan Fontaine in 2 weeks    Cathi Phillips reports feeling  better today. +flatulence and BM's, Nausea better. +ostomy output    OBJECTIVE  VITALS:  height is 5' 6\" (1.676 m) and weight is 227 lb 15.3 oz (103.4 kg). Her oral temperature is 98.1 °F (36.7 °C). Her blood pressure is 169/97 (abnormal) and her pulse is 74. Her respiration is 18 and oxygen saturation is 95%. INTAKE/OUTPUT:      Intake/Output Summary (Last 24 hours) at 4/19/2022 1046  Last data filed at 4/19/2022 0729  Gross per 24 hour   Intake 880 ml   Output 500 ml   Net 380 ml     GENERAL: alert, cooperative, no distress  LUNGS: clear to ausculation, without wheezes, rales or rhonci  HEART: normal rate and regular rhythm  ABDOMEN: Soft, appropriately tender, non distended. Ostomy pink, viable. Stool and gas in bag  EXTREMITY: no cyanosis, clubbing or edema    I/O last 3 completed shifts:   In: 400 [P.O.:400]  Out: 500 [Urine:400; Stool:100]      LABS  No results for input(s): WBC, HGB, HCT, PLT, NA, K, CL, CO2, BUN, CREATININE, MG, PHOS, CALCIUM, PTT, INR, AST, ALT, BILITOT, BILIDIR, Jose De Jesus Ablert, BACTERIA in the last 72 hours. Invalid input(s): PT, WBCU, [facility], [address] [place] [address]  Patient educated about Disease Process, Medications, Smoking Cessation, Oxygenation, Incentive Spirometry and Deep Breath and Cough, Diabetes, Hyperlipidemia, Smoking Cessation, Nutrition, Exercise and Hypertension    Electronically signed by Yuri Nieto PA-C on 4/19/2022 at 10:46 AM      [facility]   [phone] Office  [phone] Fax     Agree with above note. The patient was personally seen and examined. Hannah Salinas is doing well. Nausea improving. Taking care of her colostomy. Pain improving.     Abd soft, NT, ND    A/P: 37 yo female with metastatic colon CA s/p port insertion, lap transverse colectomy with colostomy, laparoscopic liver bx    Doing well  Ok for discharge from surgical standpoint  Follow up in 2 weeks, call for appointment    Vivi Burdick MD

## 2022-04-21 ENCOUNTER — HOSPITAL ENCOUNTER (EMERGENCY)
Age: 42
Discharge: HOME | DRG: 720 | End: 2022-04-22
Payer: COMMERCIAL

## 2022-04-21 ENCOUNTER — APPOINTMENT (OUTPATIENT)
Dept: CT IMAGING | Age: 42
DRG: 720 | End: 2022-04-21
Payer: COMMERCIAL

## 2022-04-21 DIAGNOSIS — R11.0 NAUSEA: ICD-10-CM

## 2022-04-21 DIAGNOSIS — R10.9 LEFT SIDED ABDOMINAL PAIN: Primary | ICD-10-CM

## 2022-04-21 DIAGNOSIS — C18.9 METASTATIC COLON CANCER IN FEMALE (HCC): ICD-10-CM

## 2022-04-21 LAB
A/G RATIO: 0.9 (ref 1.1–2.2)
ALBUMIN SERPL-MCNC: 3.3 G/DL (ref 3.4–5)
ALP BLD-CCNC: 103 U/L (ref 40–129)
ALT SERPL-CCNC: 23 U/L (ref 10–40)
ANION GAP SERPL CALCULATED.3IONS-SCNC: 11 MMOL/L (ref 3–16)
AST SERPL-CCNC: 49 U/L (ref 15–37)
BASOPHILS ABSOLUTE: 0 K/UL (ref 0–0.2)
BASOPHILS RELATIVE PERCENT: 0.5 %
BILIRUB SERPL-MCNC: 0.3 MG/DL (ref 0–1)
BILIRUBIN URINE: NEGATIVE
BLOOD, URINE: ABNORMAL
BUN BLDV-MCNC: 10 MG/DL (ref 7–20)
CALCIUM SERPL-MCNC: 8.8 MG/DL (ref 8.3–10.6)
CHLORIDE BLD-SCNC: 104 MMOL/L (ref 99–110)
CLARITY: ABNORMAL
CO2: 23 MMOL/L (ref 21–32)
COLOR: YELLOW
COMMENT UA: ABNORMAL
CREAT SERPL-MCNC: 0.6 MG/DL (ref 0.6–1.1)
EOSINOPHILS ABSOLUTE: 0.4 K/UL (ref 0–0.6)
EOSINOPHILS RELATIVE PERCENT: 4.5 %
EPITHELIAL CELLS, UA: ABNORMAL /HPF (ref 0–5)
GFR AFRICAN AMERICAN: >60
GFR NON-AFRICAN AMERICAN: >60
GLUCOSE BLD-MCNC: 92 MG/DL (ref 70–99)
GLUCOSE URINE: NEGATIVE MG/DL
HCG QUALITATIVE: NEGATIVE
HCG(URINE) PREGNANCY TEST: NEGATIVE
HCT VFR BLD CALC: 31.4 % (ref 36–48)
HEMOGLOBIN: 10.5 G/DL (ref 12–16)
HYALINE CASTS: ABNORMAL /LPF (ref 0–2)
KETONES, URINE: NEGATIVE MG/DL
LEUKOCYTE ESTERASE, URINE: ABNORMAL
LIPASE: 15 U/L (ref 13–60)
LYMPHOCYTES ABSOLUTE: 1.4 K/UL (ref 1–5.1)
LYMPHOCYTES RELATIVE PERCENT: 16 %
MCH RBC QN AUTO: 32.1 PG (ref 26–34)
MCHC RBC AUTO-ENTMCNC: 33.3 G/DL (ref 31–36)
MCV RBC AUTO: 96.5 FL (ref 80–100)
MICROSCOPIC EXAMINATION: YES
MONOCYTES ABSOLUTE: 0.9 K/UL (ref 0–1.3)
MONOCYTES RELATIVE PERCENT: 9.9 %
NEUTROPHILS ABSOLUTE: 6.1 K/UL (ref 1.7–7.7)
NEUTROPHILS RELATIVE PERCENT: 69.1 %
NITRITE, URINE: NEGATIVE
PDW BLD-RTO: 13.9 % (ref 12.4–15.4)
PH UA: 8.5 (ref 5–8)
PLATELET # BLD: 312 K/UL (ref 135–450)
PMV BLD AUTO: 8.2 FL (ref 5–10.5)
POTASSIUM REFLEX MAGNESIUM: 3.8 MMOL/L (ref 3.5–5.1)
PROTEIN UA: NEGATIVE MG/DL
RBC # BLD: 3.26 M/UL (ref 4–5.2)
RBC UA: ABNORMAL /HPF (ref 0–4)
SODIUM BLD-SCNC: 138 MMOL/L (ref 136–145)
SPECIFIC GRAVITY UA: 1.01 (ref 1–1.03)
TOTAL PROTEIN: 6.8 G/DL (ref 6.4–8.2)
URINE REFLEX TO CULTURE: ABNORMAL
URINE TYPE: ABNORMAL
UROBILINOGEN, URINE: 0.2 E.U./DL
WBC # BLD: 8.8 K/UL (ref 4–11)
WBC UA: ABNORMAL /HPF (ref 0–5)
YEAST: PRESENT /HPF

## 2022-04-21 PROCEDURE — 96375 TX/PRO/DX INJ NEW DRUG ADDON: CPT

## 2022-04-21 PROCEDURE — 74177 CT ABD & PELVIS W/CONTRAST: CPT

## 2022-04-21 PROCEDURE — 85025 COMPLETE CBC W/AUTO DIFF WBC: CPT

## 2022-04-21 PROCEDURE — 83690 ASSAY OF LIPASE: CPT

## 2022-04-21 PROCEDURE — 96376 TX/PRO/DX INJ SAME DRUG ADON: CPT

## 2022-04-21 PROCEDURE — 6360000004 HC RX CONTRAST MEDICATION: Performed by: PHYSICIAN ASSISTANT

## 2022-04-21 PROCEDURE — 80053 COMPREHEN METABOLIC PANEL: CPT

## 2022-04-21 PROCEDURE — 96374 THER/PROPH/DIAG INJ IV PUSH: CPT

## 2022-04-21 PROCEDURE — 84703 CHORIONIC GONADOTROPIN ASSAY: CPT

## 2022-04-21 PROCEDURE — 99285 EMERGENCY DEPT VISIT HI MDM: CPT

## 2022-04-21 PROCEDURE — 36415 COLL VENOUS BLD VENIPUNCTURE: CPT

## 2022-04-21 PROCEDURE — 6360000002 HC RX W HCPCS: Performed by: PHYSICIAN ASSISTANT

## 2022-04-21 PROCEDURE — 81001 URINALYSIS AUTO W/SCOPE: CPT

## 2022-04-21 RX ORDER — MORPHINE SULFATE 2 MG/ML
2 INJECTION, SOLUTION INTRAMUSCULAR; INTRAVENOUS ONCE
Status: COMPLETED | OUTPATIENT
Start: 2022-04-21 | End: 2022-04-21

## 2022-04-21 RX ORDER — MORPHINE SULFATE 4 MG/ML
4 INJECTION, SOLUTION INTRAMUSCULAR; INTRAVENOUS ONCE
Status: COMPLETED | OUTPATIENT
Start: 2022-04-21 | End: 2022-04-21

## 2022-04-21 RX ORDER — ONDANSETRON 2 MG/ML
4 INJECTION INTRAMUSCULAR; INTRAVENOUS ONCE
Status: COMPLETED | OUTPATIENT
Start: 2022-04-21 | End: 2022-04-21

## 2022-04-21 RX ORDER — TRAMADOL HYDROCHLORIDE 50 MG/1
50 TABLET ORAL EVERY 6 HOURS PRN
Qty: 20 TABLET | Refills: 0 | Status: ON HOLD | OUTPATIENT
Start: 2022-04-21 | End: 2022-04-29 | Stop reason: HOSPADM

## 2022-04-21 RX ADMIN — MORPHINE SULFATE 2 MG: 2 INJECTION, SOLUTION INTRAMUSCULAR; INTRAVENOUS at 22:46

## 2022-04-21 RX ADMIN — ONDANSETRON 4 MG: 2 INJECTION INTRAMUSCULAR; INTRAVENOUS at 20:55

## 2022-04-21 RX ADMIN — IOPAMIDOL 75 ML: 755 INJECTION, SOLUTION INTRAVENOUS at 21:39

## 2022-04-21 RX ADMIN — MORPHINE SULFATE 4 MG: 4 INJECTION, SOLUTION INTRAMUSCULAR; INTRAVENOUS at 20:55

## 2022-04-21 ASSESSMENT — ENCOUNTER SYMPTOMS
EYE PAIN: 0
SHORTNESS OF BREATH: 0
COUGH: 0
VOMITING: 0
BACK PAIN: 0
SORE THROAT: 0
NAUSEA: 1
ABDOMINAL PAIN: 1

## 2022-04-21 ASSESSMENT — PAIN SCALES - GENERAL
PAINLEVEL_OUTOF10: 10
PAINLEVEL_OUTOF10: 7
PAINLEVEL_OUTOF10: 5

## 2022-04-21 ASSESSMENT — PAIN - FUNCTIONAL ASSESSMENT: PAIN_FUNCTIONAL_ASSESSMENT: 0-10

## 2022-04-21 ASSESSMENT — PAIN DESCRIPTION - PAIN TYPE
TYPE: ACUTE PAIN
TYPE: ACUTE PAIN

## 2022-04-21 ASSESSMENT — PAIN DESCRIPTION - DESCRIPTORS
DESCRIPTORS: ACHING
DESCRIPTORS: ACHING

## 2022-04-21 ASSESSMENT — PAIN DESCRIPTION - ORIENTATION
ORIENTATION: LEFT
ORIENTATION: LEFT

## 2022-04-21 ASSESSMENT — PAIN DESCRIPTION - LOCATION
LOCATION: ABDOMEN
LOCATION: ABDOMEN

## 2022-04-21 ASSESSMENT — PAIN DESCRIPTION - FREQUENCY
FREQUENCY: CONTINUOUS
FREQUENCY: CONTINUOUS

## 2022-04-21 ASSESSMENT — PAIN DESCRIPTION - ONSET: ONSET: ON-GOING

## 2022-04-22 VITALS
DIASTOLIC BLOOD PRESSURE: 68 MMHG | WEIGHT: 225.75 LBS | OXYGEN SATURATION: 96 % | SYSTOLIC BLOOD PRESSURE: 154 MMHG | HEART RATE: 68 BPM | BODY MASS INDEX: 36.44 KG/M2 | TEMPERATURE: 99.2 F | RESPIRATION RATE: 16 BRPM

## 2022-04-22 ASSESSMENT — PAIN SCALES - GENERAL: PAINLEVEL_OUTOF10: 4

## 2022-04-22 ASSESSMENT — PAIN DESCRIPTION - LOCATION: LOCATION: ABDOMEN

## 2022-04-22 NOTE — ED PROVIDER NOTES
**ADVANCED PRACTICE PROVIDER, I HAVE EVALUATED THIS PATIENT**        [address]      Pt Name: Yoli NI:[id number]  Viviana 1980  Date of evaluation: 4/21/2022  Provider: Dionisio Schultz PA-C      Chief Complaint:    Chief Complaint   Patient presents with    Abdominal Pain     left sided abdominal pain. Patient had colostomy placed less than one week ago         Nursing Notes, Past Medical Hx, Past Surgical Hx, Social Hx, Allergies, and Family Hx were all reviewed and agreed with or any disagreements were addressed in the HPI.    HPI: (Location, Duration, Timing, Severity, Quality, Assoc Sx, Context, Modifying factors)    Chief Complaint of left side abdominal pain after having a colostomy bag placed. Says she just went home couple days ago. She complains of nausea. No vomiting. Denies bleeding in her back. Says she was recently diagnosed in the last week with colon cancer. Her surgeon was Dr. Gary Wilson. Denies fever. No diarrhea or constipation. She has not had any chemotherapy or radiation. She is due to see the oncologist Dr. Joe Wall this coming week. Denies chest pain, no weakness. No other complaints. This is a  36 y.o. female who presents to emergency room with above complaint.     PastMedical/Surgical History:      Diagnosis Date    Asthma     Colon cancer [facility])          Procedure Laterality Date    COLONOSCOPY N/A 4/11/2022    COLONOSCOPY WITH BIOPSY performed by Audrey Estevez MD at [address]  4/11/2022    COLONOSCOPY SUBMUCOSAL tattoo  INJE[facility] [address] [facility] 4/13/2022    LAPAROSCOPIC TRANSVERSE COLECTOMY WITH COLOSTOMY, INSERTION OF PORT A CATHETER, LAPAROSCOPIC LIVER BIOPSY performed by Divya Farfan MD at [place] [address]       Medications:  Previous Medications    DICYCLOMINE (BENTYL) 10 MG CAPSULE    Take 2 capsules by mouth every 6 hours as needed (cramps)    DOCUSATE SODIUM (COLACE) 100 MG CAPSULE    Take 1 capsule by mouth 2 times daily for 14 days    ONDANSETRON (ZOFRAN-ODT) 4 MG DISINTEGRATING TABLET    Take 1 tablet by mouth every 8 hours as needed for Nausea or Vomiting    OXYCODONE (ROXICODONE) 5 MG IMMEDIATE RELEASE TABLET    Take 1 tablet by mouth every 6 hours as needed for Pain for up to 7 days. Intended supply: 5 days. Take lowest dose possible to manage pain    PANTOPRAZOLE (PROTONIX) 40 MG TABLET    Take 1 tablet by mouth every morning (before breakfast)    POLYETHYLENE GLYCOL (GLYCOLAX) 17 G PACKET    Take 17 g by mouth daily         Review of Systems:  (2-9 systems needed)  Review of Systems   Constitutional: Negative for chills and fever. HENT: Negative for congestion and sore throat. Eyes: Negative for pain and visual disturbance. Respiratory: Negative for cough and shortness of breath. Cardiovascular: Negative for chest pain and leg swelling. Gastrointestinal: Positive for abdominal pain and nausea. Negative for vomiting. Genitourinary: Negative for dysuria and frequency. Musculoskeletal: Negative for back pain and neck pain. Skin: Negative for rash and wound. Neurological: Negative for dizziness and light-headedness. \"Positives and Pertinent negatives as per HPI\"    Physical Exam:  Physical Exam  Vitals and nursing note reviewed. Constitutional:       Appearance: She is well-developed. She is not diaphoretic. HENT:      Head: Normocephalic and atraumatic. Nose: Nose normal.      Mouth/Throat:      Mouth: Mucous membranes are moist.      Pharynx: Oropharynx is clear. No oropharyngeal exudate or posterior oropharyngeal erythema. Eyes:      General:         Right eye: No discharge. Left eye: No discharge. Cardiovascular:      Rate and Rhythm: Normal rate and regular rhythm. Heart sounds: Normal heart sounds. No murmur heard. No friction rub.  No gallop. Pulmonary:      Effort: Pulmonary effort is normal. No respiratory distress. Breath sounds: Normal breath sounds. No wheezing or rales. Chest:      Chest wall: No tenderness. Abdominal:      General: Bowel sounds are normal. There is no distension. Palpations: Abdomen is soft. There is no mass. Tenderness: There is abdominal tenderness. There is no guarding or rebound. Musculoskeletal:         General: Normal range of motion. Cervical back: Normal range of motion and neck supple. Skin:     General: Skin is warm and dry. Neurological:      General: No focal deficit present. Mental Status: She is alert and oriented to person, place, and time. Psychiatric:         Behavior: Behavior normal.         MEDICAL DECISION MAKING    Vitals:    Vitals:    04/21/22 2030 04/21/22 2045 04/21/22 2130 04/21/22 2241   BP: (!) 164/100 (!) 167/70 (!) 145/111 (!) 146/67   Pulse: 63 58 55 63   Resp: 11 19 20 16   Temp:       TempSrc:       SpO2: 99% 93% 94% 98%   Weight:           LABS:  Labs Reviewed   CBC WITH AUTO DIFFERENTIAL - Abnormal; Notable for the following components:       Result Value    RBC 3.26 (*)     Hemoglobin 10.5 (*)     Hematocrit 31.4 (*)     All other components within normal limits   COMPREHENSIVE METABOLIC PANEL W/ REFLEX TO MG FOR LOW K - Abnormal; Notable for the following components:    Albumin 3.3 (*)     Albumin/Globulin Ratio 0.9 (*)     AST 49 (*)     All other components within normal limits   URINALYSIS WITH REFLEX TO CULTURE - Abnormal; Notable for the following components:    Clarity, UA SL CLOUDY (*)     Blood, Urine TRACE-LYSED (*)     pH, UA 8.5 (*)     Leukocyte Esterase, Urine SMALL (*)     All other components within normal limits   LIPASE   PREGNANCY, URINE   HCG, SERUM, QUALITATIVE   MICROSCOPIC URINALYSIS        Remainder of labs reviewed and were negative at this time or not returned at the time of this note.     RADIOLOGY:   Non-plain film images such as CT, Ultrasound and MRI are read by the radiologist. Xiao Guaman PA-C have directly visualized the radiologic plain film image(s) with the below findings:      Interpretation per the Radiologist below, if available at the time of this note:    CT ABDOMEN PELVIS W IV CONTRAST Additional Contrast? None   Final Result   1. Postoperative changes seen related to partial colectomy and left abdominal   colostomy, with a mild volume of stool noted within the proximal colon to the   level of the ostomy site. No small bowel obstruction. 2. No fluid collection or mass is identified along the suture line at the   splenic flexure. 3. Diffuse metastatic disease identified throughout the liver, with a couple   index lesions stable when compared to the previous examination. No   significant interval change. No perihepatic ascites. 4. Basilar atelectasis is identified. 5. There is a new 6 mm nodule seen at the left lung base posteriorly. This   was not seen on the previous examination 2 weeks earlier. Recommend close   attention on follow-up as a metastatic pulmonary nodule cannot be excluded. 6. The gastrohepatic ligament lymph nodes seen on the previous study appears   centrally necrotic, measures 16 x 13 mm, previously measuring 13 x 11 mm.   7. There is a lymph node immediately adjacent to the right adrenal gland   versus an adrenal nodule measuring 17 x 13 mm, previously measuring 10 x 8 mm   felt to represent metastatic disease. CT ABDOMEN PELVIS WO CONTRAST Additional Contrast? None    Result Date: 4/10/2022  EXAMINATION: CT OF THE ABDOMEN AND PELVIS WITHOUT CONTRAST 4/9/2022 12:39 pm TECHNIQUE: CT of the abdomen and pelvis was performed without the administration of intravenous contrast. Multiplanar reformatted images are provided for review.  Dose modulation, iterative reconstruction, and/or weight based adjustment of the mA/kV was utilized to reduce the radiation dose to as low as reasonably achievable. COMPARISON: 12/23/2019 HISTORY: ORDERING SYSTEM PROVIDED HISTORY: generalized abdominal pain TECHNOLOGIST PROVIDED HISTORY: Reason for exam:->generalized abdominal pain Additional Contrast?->None Decision Support Exception - unselect if not a suspected or confirmed emergency medical condition->Emergency Medical Condition (MA) Is the patient pregnant?->No Reason for Exam: PT. C/O GENERALIZED ABD PAIN X 2 WEEKS, WITH SOME BLOOD IN STOOL WITH NAUSEA  AND EMESIS, STATES HAS HAD INCREASED PAIN WITH URINATION Relevant Medical/Surgical History: NO HX OF ABD PROBLEMS, NO HX OF SURGERY TO ABD, NO HX OF CA FINDINGS: Lower Chest: No acute infiltrate at the lung bases. Organs: Multiple hepatic lesions most consistent with metastatic disease. The largest lesion near the diaphragmatic surface of the liver measures 3.9 x 5.6 cm. Gallbladder is partially contracted. No biliary dilatation. Spleen, pancreas and adrenal glands are unremarkable. No evidence of obstructive uropathy. GI/Bowel: There is suggestion of an annular mass in the distal transverse colon concerning for underlying malignancy. No evidence of obstruction or significant inflammatory changes. Scattered colonic diverticula with no acute features. No evidence of appendicitis. No small bowel distension. Stomach and duodenal sweep are unremarkable. Small hiatal hernia. Pelvis: Trace free pelvic fluid, likely physiologic. The uterus and adnexal structures are unremarkable. Multiple calcified pelvic phleboliths. The bladder is contracted. Peritoneum/Retroperitoneum: The abdominal aorta is normal in caliber. No pathologic retroperitoneal adenopathy or upper abdominal ascites. Mesenteric adenopathy adjacent to the lesion in the transverse colon with the largest node measuring 11 x 12 mm. Bones/Soft Tissues: No acute osseous or soft tissue abnormality. Small fat containing umbilical hernia.      1. Multiple hepatic lesions most consistent with metastatic disease. Findings concerning for an annular mass in the distal transverse colon, likely a colonic primary with adjacent adenopathy. 2. No other acute findings within the abdomen or pelvis. CT CHEST WO CONTRAST    Result Date: 4/14/2022  EXAMINATION: CT OF THE CHEST WITHOUT CONTRAST 4/14/2022 12:08 pm TECHNIQUE: CT of the chest was performed without the administration of intravenous contrast. Multiplanar reformatted images are provided for review. Dose modulation, iterative reconstruction, and/or weight based adjustment of the mA/kV was utilized to reduce the radiation dose to as low as reasonably achievable. COMPARISON: 04/13/2022 HISTORY: ORDERING SYSTEM PROVIDED HISTORY: staging colon cancer any lung mets TECHNOLOGIST PROVIDED HISTORY: Reason for exam:->staging colon cancer any lung mets Is the patient pregnant?->No FINDINGS: Mediastinum: Coronary artery calcifications are a marker of atherosclerosis. There are no enlarged thoracic lymph nodes. The left port terminates in the right atrium. Lungs/pleura: The tracheobronchial tree is patent. There is no pneumothorax. There are trace bilateral pleural effusions with bibasilar atelectasis. Otherwise, the lungs are clear. Upper Abdomen: A small hiatal hernia is noted. There are multiple low attenuating lesions throughout the liver favoring metastatic disease, limited by the lack of intravenous contrast.  This is better evaluated on the recent CT of the abdomen pelvis. A trace amount of pneumoperitoneum is present in the right upper quadrant likely from recent colectomy. Soft Tissues/Bones: Degenerative changes involve the thoracic spine. 1. Trace bilateral pleural effusions. 2. Unchanged diffuse hepatic metastases.      CT ABDOMEN W CONTRAST Additional Contrast? Radiologist Recommendation    Result Date: 4/11/2022  EXAMINATION: CT ABDOMEN WITH CONTRAST 4/11/2022 9:27 am TECHNIQUE: CT of the abdomen was performed with the administration of intravenous contrast. Multiplanar reformatted images are provided for review. Dose modulation, iterative reconstruction, and/or weight based adjustment of the mA/kV was utilized to reduce the radiation dose to as low as reasonably achievable. COMPARISON: 04/09/2022 HISTORY: ORDERING SYSTEM PROVIDED HISTORY: triple phase CT of the liver to assess liver masses TECHNOLOGIST PROVIDED HISTORY: Reason for exam:->triple phase CT of the liver to assess liver masses Additional Contrast?->Radiologist Recommendation Reason for Exam: triple phase CT of the liver to assess liver masses, patient had w/o scan 4/9 FINDINGS: Lower Chest: There is no consolidation or effusion. There is mild circumferential wall thickening involving the distal esophagus just above the GE junction with thickness approaching 8 mm. There is a small 1.3 cm necrotic lymph node within the gastrohepatic ligament. Organs: There are multiple low-attenuation soft tissue masses scattered throughout the liver. The largest is within the left hepatic dome and measures 6 by 4 cm. There is a small 13 mm cystic lesion within the pancreatic tail with Hounsfield units of 16. The remainder of the solid abdominal organs are unremarkable. GI/Bowel: There is no bowel dilatation, wall thickening or obstruction. Peritoneum/Retroperitoneum: There is no free air, free fluid or intraperitoneal inflammatory change. Bones/Soft Tissues: There is no acute fracture or aggressive osseous lesion. 1. Disseminated hepatic metastatic disease. 2. Nonspecific distal esophageal wall thickening. Endoscopy recommended to further evaluate. Neoplasm is a diagnostic consideration.  RECOMMENDATIONS: Unavailable     XR CHEST PORTABLE    Result Date: 4/13/2022  EXAM: XR Chest, 1 View EXAM DATE/TIME: 4/13/2022 5:02 pm CLINICAL HISTORY: ORDERING SYSTEM PROVIDED  s/p port insertion  TECHNOLOGIST PROVIDED HISTORY: Reason for exam:->s/p port insertion  Reason for Exam: s/p port insertion TECHNIQUE: Frontal view of the chest. COMPARISON: 12/01/2014 FINDINGS: Lungs:  No acute findings. No consolidation. Pleural space:  No acute findings. No pneumothorax. Heart:  No acute findings. No cardiomegaly. Mediastinum:  No acute findings. Bones/joints:  No acute findings. Tubes, lines and devices:  Left-sided chest port has been placed via the left internal jugular vein with the tip in the lower right atrium. A loop of the catheter extends cephalad in the neck and is not included on this study and therefore abnormalities of the catheter in this area, such as kinking cannot be excluded. 1.  Left-sided chest port has been placed via the left internal jugular vein with the tip in the lower right atrium. A loop of the catheter extends cephalad in the neck and is not included on this study and therefore abnormalities of the catheter in this area, such as kinking cannot be excluded. No evidence of pneumothorax. 2.  Otherwise no acute pulmonary disease or significant change noted. [facility] VASCULAR ACCESS GUIDANCE    Result Date: 4/13/2022  Radiology exam is complete. No Radiologist dictation. Please follow up with ordering provider. Colonoscopy    Result Date: 4/11/2022  No dictation          MEDICAL DECISION MAKING / ED COURSE:      PROCEDURES:   Procedures    None    Patient was given:  Medications   morphine (PF) injection 4 mg (4 mg IntraVENous Given 4/21/22 2055)   ondansetron (ZOFRAN) injection 4 mg (4 mg IntraVENous Given 4/21/22 2055)   iopamidol (ISOVUE-370) 76 % injection 75 mL (75 mLs IntraVENous Given 4/21/22 2139)   morphine (PF) injection 2 mg (2 mg IntraVENous Given 4/21/22 2246)     Emergency room course: Patient on exam throat is clear. Nonerythematous no exudate. Cardiovascular regular rhythm, lungs are clear. No wheeze rales or rhonchi noted. No chest wall tenderness. Abdomen is soft mild left-sided tenderness to palpation. Colostomy bag shows intact.   There is some stool no blood noted. There is no area of redness around the back. And no CVA or flank tenderness. Full range of motion of extremity. Patient is alert oriented x4. Does not appear to be in acute distress. Qualitative hCG is negative    CBC shows white count of 8.8, RBC 3.26, hemoglobin 10.5, hematocrit 31.4. CMP shows AST slightly elevated 49. All other values are within normal limits. Lipase 15.0. T scan abdomen and pelvis with IV contrast showed as above. There is a new nodule in the left posterior lung base. And some enlargement of some lymph nodes that was previously seen. See above. There is no obstruction. Discussed patient CT results as well as her lab results with her. She tells me that the Percocet she has makes her nauseated and she cannot tolerate. I will put her on tramadol. Continue to Zofran. And informed her that she can take to the Zofran if needed. Return for any worsening. Follow-up with Dr. Val Feliciano. She will be discharged stable condition. The patient tolerated their visit well. I evaluated the patient. The physician was available for consultation as needed. The patient and / or the family were informed of the results of any tests, a time was given to answer questions, a plan was proposed and they agreed with plan. CLINICAL IMPRESSION:  1. Left sided abdominal pain    2. Nausea    3. Metastatic colon cancer in female [facility])        DISPOSITION Decision To Discharge 04/21/2022 10:42:29 PM      PATIENT REFERRED TO:  Patricia Galan MD  [address] [place] [address]  [phone]    Call in 1 day        DISCHARGE MEDICATIONS:  New Prescriptions    TRAMADOL (ULTRAM) 50 MG TABLET    Take 1 tablet by mouth every 6 hours as needed for Pain (MAY TAKE 2 TABS EVERY 6 HOURS FOR SEVERE PAIN) for up to 5 days.        DISCONTINUED MEDICATIONS:  Discontinued Medications    No medications on file              (Please note the MDM and HPI sections of this note were completed with a voice recognition program.  Efforts were made to edit the dictations but occasionally words are mis-transcribed.)    Electronically signed, Kendy Maurer PA-C,          Kendy Maurer PA-C  04/21/22 2573

## 2022-04-22 NOTE — ED NOTES
Pt arrived via private vehicle c/o lower left abdominal pain since yesterday and emesis. Pt had a colon resection for colon cancer with a colostomy placed on Thursday April 14th. Pt went home Tuesday. Pt states the pain medicine prescribed by the physician is too strong. Pt is AOx4. Skin is warm and dry. Pain 8/10 tearing feeling in lower left abdomin. Respirations even, easy, unlabored. Pt Sinus branden on the monitor HR 56.      Nissa Sebastian RN  04/21/22 2000

## 2022-04-22 NOTE — ED NOTES
Pt ambulated to the restroom with assistance. Gait weak.      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

## 2022-04-24 ENCOUNTER — APPOINTMENT (OUTPATIENT)
Dept: GENERAL RADIOLOGY | Age: 42
DRG: 720 | End: 2022-04-24
Payer: COMMERCIAL

## 2022-04-24 ENCOUNTER — HOSPITAL ENCOUNTER (INPATIENT)
Age: 42
LOS: 5 days | Discharge: HOME | DRG: 720 | End: 2022-04-29
Attending: STUDENT IN AN ORGANIZED HEALTH CARE EDUCATION/TRAINING PROGRAM | Admitting: STUDENT IN AN ORGANIZED HEALTH CARE EDUCATION/TRAINING PROGRAM
Payer: COMMERCIAL

## 2022-04-24 DIAGNOSIS — R10.84 GENERALIZED ABDOMINAL PAIN: Primary | ICD-10-CM

## 2022-04-24 PROBLEM — R10.9 ABDOMINAL PAIN: Status: ACTIVE | Noted: 2022-04-24

## 2022-04-24 LAB
A/G RATIO: 0.8 (ref 1.1–2.2)
ALBUMIN SERPL-MCNC: 3.4 G/DL (ref 3.4–5)
ALP BLD-CCNC: 132 U/L (ref 40–129)
ALT SERPL-CCNC: 37 U/L (ref 10–40)
ANION GAP SERPL CALCULATED.3IONS-SCNC: 15 MMOL/L (ref 3–16)
AST SERPL-CCNC: 133 U/L (ref 15–37)
BASOPHILS ABSOLUTE: 0 K/UL (ref 0–0.2)
BASOPHILS RELATIVE PERCENT: 0.3 %
BILIRUB SERPL-MCNC: 0.5 MG/DL (ref 0–1)
BUN BLDV-MCNC: 8 MG/DL (ref 7–20)
CALCIUM SERPL-MCNC: 9.6 MG/DL (ref 8.3–10.6)
CHLORIDE BLD-SCNC: 97 MMOL/L (ref 99–110)
CO2: 20 MMOL/L (ref 21–32)
CREAT SERPL-MCNC: 0.7 MG/DL (ref 0.6–1.1)
EOSINOPHILS ABSOLUTE: 0 K/UL (ref 0–0.6)
EOSINOPHILS RELATIVE PERCENT: 0.1 %
GFR AFRICAN AMERICAN: >60
GFR NON-AFRICAN AMERICAN: >60
GLUCOSE BLD-MCNC: 97 MG/DL (ref 70–99)
HCT VFR BLD CALC: 35 % (ref 36–48)
HEMOGLOBIN: 11.5 G/DL (ref 12–16)
LACTIC ACID, SEPSIS: 1.7 MMOL/L (ref 0.4–1.9)
LIPASE: 17 U/L (ref 13–60)
LYMPHOCYTES ABSOLUTE: 1.1 K/UL (ref 1–5.1)
LYMPHOCYTES RELATIVE PERCENT: 8.2 %
MCH RBC QN AUTO: 30.9 PG (ref 26–34)
MCHC RBC AUTO-ENTMCNC: 32.9 G/DL (ref 31–36)
MCV RBC AUTO: 93.9 FL (ref 80–100)
MONOCYTES ABSOLUTE: 1.4 K/UL (ref 0–1.3)
MONOCYTES RELATIVE PERCENT: 11.2 %
NEUTROPHILS ABSOLUTE: 10.3 K/UL (ref 1.7–7.7)
NEUTROPHILS RELATIVE PERCENT: 80.2 %
PDW BLD-RTO: 13.6 % (ref 12.4–15.4)
PLATELET # BLD: 386 K/UL (ref 135–450)
PMV BLD AUTO: 8.5 FL (ref 5–10.5)
POTASSIUM SERPL-SCNC: 4.1 MMOL/L (ref 3.5–5.1)
RBC # BLD: 3.73 M/UL (ref 4–5.2)
SODIUM BLD-SCNC: 132 MMOL/L (ref 136–145)
TOTAL PROTEIN: 7.5 G/DL (ref 6.4–8.2)
WBC # BLD: 12.9 K/UL (ref 4–11)

## 2022-04-24 PROCEDURE — 80053 COMPREHEN METABOLIC PANEL: CPT

## 2022-04-24 PROCEDURE — 99285 EMERGENCY DEPT VISIT HI MDM: CPT

## 2022-04-24 PROCEDURE — 2580000003 HC RX 258: Performed by: STUDENT IN AN ORGANIZED HEALTH CARE EDUCATION/TRAINING PROGRAM

## 2022-04-24 PROCEDURE — 85025 COMPLETE CBC W/AUTO DIFF WBC: CPT

## 2022-04-24 PROCEDURE — 1200000000 HC SEMI PRIVATE

## 2022-04-24 PROCEDURE — 96361 HYDRATE IV INFUSION ADD-ON: CPT

## 2022-04-24 PROCEDURE — 96376 TX/PRO/DX INJ SAME DRUG ADON: CPT

## 2022-04-24 PROCEDURE — 96374 THER/PROPH/DIAG INJ IV PUSH: CPT

## 2022-04-24 PROCEDURE — 96375 TX/PRO/DX INJ NEW DRUG ADDON: CPT

## 2022-04-24 PROCEDURE — 6360000002 HC RX W HCPCS: Performed by: STUDENT IN AN ORGANIZED HEALTH CARE EDUCATION/TRAINING PROGRAM

## 2022-04-24 PROCEDURE — 83690 ASSAY OF LIPASE: CPT

## 2022-04-24 PROCEDURE — 87040 BLOOD CULTURE FOR BACTERIA: CPT

## 2022-04-24 PROCEDURE — 71045 X-RAY EXAM CHEST 1 VIEW: CPT

## 2022-04-24 PROCEDURE — 83605 ASSAY OF LACTIC ACID: CPT

## 2022-04-24 PROCEDURE — 36415 COLL VENOUS BLD VENIPUNCTURE: CPT

## 2022-04-24 RX ORDER — MORPHINE SULFATE 4 MG/ML
4 INJECTION, SOLUTION INTRAMUSCULAR; INTRAVENOUS EVERY 4 HOURS PRN
Status: CANCELLED | OUTPATIENT
Start: 2022-04-24

## 2022-04-24 RX ORDER — 0.9 % SODIUM CHLORIDE 0.9 %
1000 INTRAVENOUS SOLUTION INTRAVENOUS ONCE
Status: COMPLETED | OUTPATIENT
Start: 2022-04-24 | End: 2022-04-24

## 2022-04-24 RX ORDER — ONDANSETRON 2 MG/ML
4 INJECTION INTRAMUSCULAR; INTRAVENOUS ONCE
Status: COMPLETED | OUTPATIENT
Start: 2022-04-24 | End: 2022-04-24

## 2022-04-24 RX ORDER — FENTANYL CITRATE 50 UG/ML
50 INJECTION, SOLUTION INTRAMUSCULAR; INTRAVENOUS ONCE
Status: COMPLETED | OUTPATIENT
Start: 2022-04-24 | End: 2022-04-24

## 2022-04-24 RX ORDER — MORPHINE SULFATE 2 MG/ML
2 INJECTION, SOLUTION INTRAMUSCULAR; INTRAVENOUS EVERY 4 HOURS PRN
Status: CANCELLED | OUTPATIENT
Start: 2022-04-24

## 2022-04-24 RX ORDER — 0.9 % SODIUM CHLORIDE 0.9 %
1000 INTRAVENOUS SOLUTION INTRAVENOUS ONCE
Status: COMPLETED | OUTPATIENT
Start: 2022-04-24 | End: 2022-04-25

## 2022-04-24 RX ORDER — SODIUM CHLORIDE 9 MG/ML
INJECTION, SOLUTION INTRAVENOUS CONTINUOUS
Status: ACTIVE | OUTPATIENT
Start: 2022-04-24 | End: 2022-04-25

## 2022-04-24 RX ADMIN — SODIUM CHLORIDE 999 ML: 9 INJECTION, SOLUTION INTRAVENOUS at 21:56

## 2022-04-24 RX ADMIN — ONDANSETRON 4 MG: 2 INJECTION INTRAMUSCULAR; INTRAVENOUS at 22:22

## 2022-04-24 RX ADMIN — ONDANSETRON 4 MG: 2 INJECTION INTRAMUSCULAR; INTRAVENOUS at 20:23

## 2022-04-24 RX ADMIN — SODIUM CHLORIDE 1000 ML: 9 INJECTION, SOLUTION INTRAVENOUS at 23:56

## 2022-04-24 RX ADMIN — FENTANYL CITRATE 50 MCG: 50 INJECTION, SOLUTION INTRAMUSCULAR; INTRAVENOUS at 22:20

## 2022-04-24 ASSESSMENT — PAIN DESCRIPTION - DESCRIPTORS: DESCRIPTORS: TEARING

## 2022-04-24 ASSESSMENT — PAIN DESCRIPTION - LOCATION: LOCATION: ABDOMEN

## 2022-04-24 ASSESSMENT — PAIN DESCRIPTION - ORIENTATION: ORIENTATION: MID

## 2022-04-24 ASSESSMENT — PAIN SCALES - GENERAL: PAINLEVEL_OUTOF10: 7

## 2022-04-24 NOTE — ED TRIAGE NOTES
Patient states she has stage 4 colon cancer that has gone to her liver. Patient states she has been vomiting all day and unable to keep anything down. She had part of her colon removed on 04- and now has a colostomy bag. Patient states Caitlyn Lange is not helping.

## 2022-04-25 ENCOUNTER — APPOINTMENT (OUTPATIENT)
Dept: CT IMAGING | Age: 42
DRG: 720 | End: 2022-04-25
Payer: COMMERCIAL

## 2022-04-25 PROBLEM — C80.1 MALIGNANCY (HCC): Status: ACTIVE | Noted: 2022-04-25

## 2022-04-25 PROBLEM — A41.9 SEPSIS (HCC): Status: ACTIVE | Noted: 2022-04-25

## 2022-04-25 LAB
A/G RATIO: 0.8 (ref 1.1–2.2)
A/G RATIO: 1.1 (ref 1.1–2.2)
ALBUMIN SERPL-MCNC: 2.8 G/DL (ref 3.4–5)
ALBUMIN SERPL-MCNC: 3.2 G/DL (ref 3.4–5)
ALP BLD-CCNC: 104 U/L (ref 40–129)
ALP BLD-CCNC: 99 U/L (ref 40–129)
ALT SERPL-CCNC: 28 U/L (ref 10–40)
ALT SERPL-CCNC: 30 U/L (ref 10–40)
ANION GAP SERPL CALCULATED.3IONS-SCNC: 12 MMOL/L (ref 3–16)
ANION GAP SERPL CALCULATED.3IONS-SCNC: 13 MMOL/L (ref 3–16)
AST SERPL-CCNC: 73 U/L (ref 15–37)
AST SERPL-CCNC: 86 U/L (ref 15–37)
BASOPHILS ABSOLUTE: 0 K/UL (ref 0–0.2)
BASOPHILS RELATIVE PERCENT: 0.3 %
BILIRUB SERPL-MCNC: 0.6 MG/DL (ref 0–1)
BILIRUB SERPL-MCNC: 0.6 MG/DL (ref 0–1)
BILIRUBIN URINE: NEGATIVE
BLOOD, URINE: ABNORMAL
BUN BLDV-MCNC: 7 MG/DL (ref 7–20)
BUN BLDV-MCNC: 8 MG/DL (ref 7–20)
CALCIUM SERPL-MCNC: 8.2 MG/DL (ref 8.3–10.6)
CALCIUM SERPL-MCNC: 8.4 MG/DL (ref 8.3–10.6)
CHLORIDE BLD-SCNC: 102 MMOL/L (ref 99–110)
CHLORIDE BLD-SCNC: 102 MMOL/L (ref 99–110)
CLARITY: CLEAR
CO2: 19 MMOL/L (ref 21–32)
CO2: 20 MMOL/L (ref 21–32)
COLOR: YELLOW
CREAT SERPL-MCNC: 0.6 MG/DL (ref 0.6–1.1)
CREAT SERPL-MCNC: 0.6 MG/DL (ref 0.6–1.1)
EOSINOPHILS ABSOLUTE: 0 K/UL (ref 0–0.6)
EOSINOPHILS RELATIVE PERCENT: 0.1 %
EPITHELIAL CELLS, UA: 6 /HPF (ref 0–5)
GFR AFRICAN AMERICAN: >60
GFR AFRICAN AMERICAN: >60
GFR NON-AFRICAN AMERICAN: >60
GFR NON-AFRICAN AMERICAN: >60
GLUCOSE BLD-MCNC: 101 MG/DL (ref 70–99)
GLUCOSE BLD-MCNC: 103 MG/DL (ref 70–99)
GLUCOSE URINE: NEGATIVE MG/DL
HCT VFR BLD CALC: 28.1 % (ref 36–48)
HEMATOLOGY PATH CONSULT: NO
HEMOGLOBIN: 9.4 G/DL (ref 12–16)
HYALINE CASTS: 1 /LPF (ref 0–8)
KETONES, URINE: NEGATIVE MG/DL
LACTIC ACID, SEPSIS: 0.8 MMOL/L (ref 0.4–1.9)
LEUKOCYTE ESTERASE, URINE: NEGATIVE
LYMPHOCYTES ABSOLUTE: 1 K/UL (ref 1–5.1)
LYMPHOCYTES RELATIVE PERCENT: 7.4 %
MAGNESIUM: 1.5 MG/DL (ref 1.8–2.4)
MCH RBC QN AUTO: 31.6 PG (ref 26–34)
MCHC RBC AUTO-ENTMCNC: 33.4 G/DL (ref 31–36)
MCV RBC AUTO: 94.7 FL (ref 80–100)
MICROSCOPIC EXAMINATION: YES
MONOCYTES ABSOLUTE: 2.2 K/UL (ref 0–1.3)
MONOCYTES RELATIVE PERCENT: 15.4 %
NEUTROPHILS ABSOLUTE: 10.8 K/UL (ref 1.7–7.7)
NEUTROPHILS RELATIVE PERCENT: 76.8 %
NITRITE, URINE: NEGATIVE
PDW BLD-RTO: 13.8 % (ref 12.4–15.4)
PH UA: 7 (ref 5–8)
PLATELET # BLD: 291 K/UL (ref 135–450)
PMV BLD AUTO: 7.6 FL (ref 5–10.5)
POTASSIUM REFLEX MAGNESIUM: 4 MMOL/L (ref 3.5–5.1)
POTASSIUM SERPL-SCNC: 3.6 MMOL/L (ref 3.5–5.1)
PROTEIN UA: NEGATIVE MG/DL
RBC # BLD: 2.97 M/UL (ref 4–5.2)
RBC UA: 2 /HPF (ref 0–4)
SODIUM BLD-SCNC: 134 MMOL/L (ref 136–145)
SODIUM BLD-SCNC: 134 MMOL/L (ref 136–145)
SPECIFIC GRAVITY UA: 1.01 (ref 1–1.03)
TOTAL PROTEIN: 6 G/DL (ref 6.4–8.2)
TOTAL PROTEIN: 6.3 G/DL (ref 6.4–8.2)
URINE REFLEX TO CULTURE: ABNORMAL
URINE TYPE: ABNORMAL
UROBILINOGEN, URINE: 0.2 E.U./DL
WBC # BLD: 14.1 K/UL (ref 4–11)
WBC UA: 1 /HPF (ref 0–5)

## 2022-04-25 PROCEDURE — 80053 COMPREHEN METABOLIC PANEL: CPT

## 2022-04-25 PROCEDURE — 74177 CT ABD & PELVIS W/CONTRAST: CPT

## 2022-04-25 PROCEDURE — 6360000002 HC RX W HCPCS: Performed by: HOSPITALIST

## 2022-04-25 PROCEDURE — 6370000000 HC RX 637 (ALT 250 FOR IP): Performed by: STUDENT IN AN ORGANIZED HEALTH CARE EDUCATION/TRAINING PROGRAM

## 2022-04-25 PROCEDURE — 94760 N-INVAS EAR/PLS OXIMETRY 1: CPT

## 2022-04-25 PROCEDURE — 2580000003 HC RX 258: Performed by: STUDENT IN AN ORGANIZED HEALTH CARE EDUCATION/TRAINING PROGRAM

## 2022-04-25 PROCEDURE — 99024 POSTOP FOLLOW-UP VISIT: CPT | Performed by: SURGERY

## 2022-04-25 PROCEDURE — APPSS180 APP SPLIT SHARED TIME > 60 MINUTES: Performed by: PHYSICIAN ASSISTANT

## 2022-04-25 PROCEDURE — 36415 COLL VENOUS BLD VENIPUNCTURE: CPT

## 2022-04-25 PROCEDURE — 1200000000 HC SEMI PRIVATE

## 2022-04-25 PROCEDURE — 6360000004 HC RX CONTRAST MEDICATION: Performed by: SURGERY

## 2022-04-25 PROCEDURE — 2500000003 HC RX 250 WO HCPCS: Performed by: STUDENT IN AN ORGANIZED HEALTH CARE EDUCATION/TRAINING PROGRAM

## 2022-04-25 PROCEDURE — APPNB180 APP NON BILLABLE TIME > 60 MINS: Performed by: PHYSICIAN ASSISTANT

## 2022-04-25 PROCEDURE — 6360000002 HC RX W HCPCS: Performed by: STUDENT IN AN ORGANIZED HEALTH CARE EDUCATION/TRAINING PROGRAM

## 2022-04-25 PROCEDURE — 85025 COMPLETE CBC W/AUTO DIFF WBC: CPT

## 2022-04-25 PROCEDURE — 81001 URINALYSIS AUTO W/SCOPE: CPT

## 2022-04-25 PROCEDURE — 6360000004 HC RX CONTRAST MEDICATION

## 2022-04-25 PROCEDURE — 83735 ASSAY OF MAGNESIUM: CPT

## 2022-04-25 PROCEDURE — 83605 ASSAY OF LACTIC ACID: CPT

## 2022-04-25 PROCEDURE — 99024 POSTOP FOLLOW-UP VISIT: CPT | Performed by: PHYSICIAN ASSISTANT

## 2022-04-25 RX ORDER — DICYCLOMINE HYDROCHLORIDE 10 MG/1
20 CAPSULE ORAL EVERY 6 HOURS PRN
Status: DISCONTINUED | OUTPATIENT
Start: 2022-04-25 | End: 2022-04-29 | Stop reason: HOSPADM

## 2022-04-25 RX ORDER — SODIUM CHLORIDE 0.9 % (FLUSH) 0.9 %
5-40 SYRINGE (ML) INJECTION EVERY 12 HOURS SCHEDULED
Status: DISCONTINUED | OUTPATIENT
Start: 2022-04-25 | End: 2022-04-29 | Stop reason: HOSPADM

## 2022-04-25 RX ORDER — ACETAMINOPHEN 650 MG/1
650 SUPPOSITORY RECTAL EVERY 6 HOURS PRN
Status: DISCONTINUED | OUTPATIENT
Start: 2022-04-25 | End: 2022-04-29 | Stop reason: HOSPADM

## 2022-04-25 RX ORDER — ONDANSETRON 2 MG/ML
4 INJECTION INTRAMUSCULAR; INTRAVENOUS EVERY 6 HOURS PRN
Status: DISCONTINUED | OUTPATIENT
Start: 2022-04-25 | End: 2022-04-29 | Stop reason: HOSPADM

## 2022-04-25 RX ORDER — ACETAMINOPHEN 325 MG/1
650 TABLET ORAL EVERY 6 HOURS PRN
Status: DISCONTINUED | OUTPATIENT
Start: 2022-04-25 | End: 2022-04-29 | Stop reason: HOSPADM

## 2022-04-25 RX ORDER — METRONIDAZOLE 500 MG/100ML
500 INJECTION, SOLUTION INTRAVENOUS EVERY 8 HOURS
Status: DISCONTINUED | OUTPATIENT
Start: 2022-04-25 | End: 2022-04-29 | Stop reason: HOSPADM

## 2022-04-25 RX ORDER — SODIUM CHLORIDE 9 MG/ML
INJECTION, SOLUTION INTRAVENOUS PRN
Status: DISCONTINUED | OUTPATIENT
Start: 2022-04-25 | End: 2022-04-29 | Stop reason: HOSPADM

## 2022-04-25 RX ORDER — PANTOPRAZOLE SODIUM 40 MG/1
40 TABLET, DELAYED RELEASE ORAL
Status: DISCONTINUED | OUTPATIENT
Start: 2022-04-25 | End: 2022-04-29 | Stop reason: HOSPADM

## 2022-04-25 RX ORDER — SODIUM CHLORIDE 0.9 % (FLUSH) 0.9 %
5-40 SYRINGE (ML) INJECTION PRN
Status: DISCONTINUED | OUTPATIENT
Start: 2022-04-25 | End: 2022-04-29 | Stop reason: HOSPADM

## 2022-04-25 RX ORDER — ENOXAPARIN SODIUM 100 MG/ML
40 INJECTION SUBCUTANEOUS DAILY
Status: DISCONTINUED | OUTPATIENT
Start: 2022-04-25 | End: 2022-04-29 | Stop reason: HOSPADM

## 2022-04-25 RX ORDER — MAGNESIUM SULFATE IN WATER 40 MG/ML
2000 INJECTION, SOLUTION INTRAVENOUS ONCE
Status: COMPLETED | OUTPATIENT
Start: 2022-04-25 | End: 2022-04-25

## 2022-04-25 RX ADMIN — ACETAMINOPHEN 650 MG: 325 TABLET ORAL at 17:32

## 2022-04-25 RX ADMIN — DICYCLOMINE HYDROCHLORIDE 20 MG: 10 CAPSULE ORAL at 04:05

## 2022-04-25 RX ADMIN — HYDROMORPHONE HYDROCHLORIDE 0.25 MG: 1 INJECTION, SOLUTION INTRAMUSCULAR; INTRAVENOUS; SUBCUTANEOUS at 02:42

## 2022-04-25 RX ADMIN — METRONIDAZOLE 500 MG: 500 INJECTION, SOLUTION INTRAVENOUS at 17:31

## 2022-04-25 RX ADMIN — HYDROMORPHONE HYDROCHLORIDE 0.5 MG: 1 INJECTION, SOLUTION INTRAMUSCULAR; INTRAVENOUS; SUBCUTANEOUS at 05:52

## 2022-04-25 RX ADMIN — METRONIDAZOLE 500 MG: 500 INJECTION, SOLUTION INTRAVENOUS at 02:45

## 2022-04-25 RX ADMIN — MAGNESIUM SULFATE HEPTAHYDRATE 2000 MG: 40 INJECTION, SOLUTION INTRAVENOUS at 16:22

## 2022-04-25 RX ADMIN — SODIUM CHLORIDE: 9 INJECTION, SOLUTION INTRAVENOUS at 14:22

## 2022-04-25 RX ADMIN — IOHEXOL 50 ML: 240 INJECTION, SOLUTION INTRATHECAL; INTRAVASCULAR; INTRAVENOUS; ORAL at 09:38

## 2022-04-25 RX ADMIN — PANTOPRAZOLE SODIUM 40 MG: 40 TABLET, DELAYED RELEASE ORAL at 05:53

## 2022-04-25 RX ADMIN — CEFEPIME HYDROCHLORIDE 2000 MG: 2 INJECTION, POWDER, FOR SOLUTION INTRAVENOUS at 13:09

## 2022-04-25 RX ADMIN — ONDANSETRON 4 MG: 2 INJECTION INTRAMUSCULAR; INTRAVENOUS at 03:27

## 2022-04-25 RX ADMIN — ACETAMINOPHEN 650 MG: 325 TABLET ORAL at 04:05

## 2022-04-25 RX ADMIN — ONDANSETRON 4 MG: 2 INJECTION INTRAMUSCULAR; INTRAVENOUS at 16:23

## 2022-04-25 RX ADMIN — IOPAMIDOL 75 ML: 755 INJECTION, SOLUTION INTRAVENOUS at 11:06

## 2022-04-25 RX ADMIN — CEFEPIME HYDROCHLORIDE 2000 MG: 2 INJECTION, POWDER, FOR SOLUTION INTRAVENOUS at 01:08

## 2022-04-25 RX ADMIN — ONDANSETRON 4 MG: 2 INJECTION INTRAMUSCULAR; INTRAVENOUS at 09:43

## 2022-04-25 RX ADMIN — HYDROMORPHONE HYDROCHLORIDE 0.5 MG: 1 INJECTION, SOLUTION INTRAMUSCULAR; INTRAVENOUS; SUBCUTANEOUS at 14:22

## 2022-04-25 RX ADMIN — HYDROMORPHONE HYDROCHLORIDE 0.25 MG: 1 INJECTION, SOLUTION INTRAMUSCULAR; INTRAVENOUS; SUBCUTANEOUS at 09:38

## 2022-04-25 RX ADMIN — SODIUM CHLORIDE: 9 INJECTION, SOLUTION INTRAVENOUS at 03:49

## 2022-04-25 RX ADMIN — METRONIDAZOLE 500 MG: 500 INJECTION, SOLUTION INTRAVENOUS at 09:40

## 2022-04-25 ASSESSMENT — PAIN DESCRIPTION - ONSET
ONSET: PROGRESSIVE
ONSET: ON-GOING

## 2022-04-25 ASSESSMENT — PAIN SCALES - GENERAL
PAINLEVEL_OUTOF10: 5
PAINLEVEL_OUTOF10: 0
PAINLEVEL_OUTOF10: 0
PAINLEVEL_OUTOF10: 7
PAINLEVEL_OUTOF10: 5
PAINLEVEL_OUTOF10: 0
PAINLEVEL_OUTOF10: 5
PAINLEVEL_OUTOF10: 7
PAINLEVEL_OUTOF10: 3
PAINLEVEL_OUTOF10: 0

## 2022-04-25 ASSESSMENT — PAIN DESCRIPTION - FREQUENCY
FREQUENCY: CONTINUOUS
FREQUENCY: INTERMITTENT
FREQUENCY: CONTINUOUS
FREQUENCY: CONTINUOUS

## 2022-04-25 ASSESSMENT — PAIN DESCRIPTION - DESCRIPTORS
DESCRIPTORS: SORE
DESCRIPTORS: ACHING
DESCRIPTORS: ACHING;SHARP
DESCRIPTORS: ACHING

## 2022-04-25 ASSESSMENT — PAIN DESCRIPTION - ORIENTATION
ORIENTATION: MID;LOWER
ORIENTATION: MID;LOWER
ORIENTATION: MID
ORIENTATION: LEFT;RIGHT

## 2022-04-25 ASSESSMENT — PAIN - FUNCTIONAL ASSESSMENT
PAIN_FUNCTIONAL_ASSESSMENT: PREVENTS OR INTERFERES SOME ACTIVE ACTIVITIES AND ADLS
PAIN_FUNCTIONAL_ASSESSMENT: PREVENTS OR INTERFERES SOME ACTIVE ACTIVITIES AND ADLS
PAIN_FUNCTIONAL_ASSESSMENT: ACTIVITIES ARE NOT PREVENTED
PAIN_FUNCTIONAL_ASSESSMENT: PREVENTS OR INTERFERES SOME ACTIVE ACTIVITIES AND ADLS

## 2022-04-25 ASSESSMENT — PAIN SCALES - WONG BAKER
WONGBAKER_NUMERICALRESPONSE: 4
WONGBAKER_NUMERICALRESPONSE: 0
WONGBAKER_NUMERICALRESPONSE: 2

## 2022-04-25 ASSESSMENT — PAIN DESCRIPTION - LOCATION
LOCATION: ABDOMEN
LOCATION: ABDOMEN;HEAD
LOCATION: HEAD

## 2022-04-25 ASSESSMENT — PAIN DESCRIPTION - PAIN TYPE
TYPE: ACUTE PAIN

## 2022-04-25 NOTE — ED PROVIDER NOTES
Primary Care Physician: No primary care provider on file. Attending Physician: Desi King MD     History   Chief Complaint   Patient presents with    Abdominal Pain     post surgical complication         HPI   Cristiane Fung  is a 36 y.o. female history of metastatic colon cancer to the liver status post resection who presents complaining of abdominal pain associated with nausea vomiting. Patient has been seen here multiple times with similar presentation. She recently was seen here 3 days ago with a CT scan of the abdomen which was negative for any acute findings. Patient is back today with similar complaints. She is stating that she is unable to tolerate p.o. is having nausea vomiting abdominal pain. Past Medical History:   Diagnosis Date    Asthma     Colon cancer [facility])         Past Surgical History:   Procedure Laterality Date    COLONOSCOPY N/A 4/11/2022    COLONOSCOPY WITH BIOPSY performed by Sergio Gomez MD at [address]  4/11/2022    COLONOSCOPY SUBMUCOSAL tattoo  INJECTION [facility] [address] 4/13/2022    LAPAROSCOPIC TRANSVERSE COLECTOMY WITH COLOSTOMY, INSERTION OF PORT A CATHETER, LAPAROSCOPIC LIVER BIOPSY performed by Sylvain Kasper MD at [place] [address]        No family history on file.      Social History     Socioeconomic History    Marital status: Single     Spouse name: Not on file    Number of children: Not on file    Years of education: Not on file    Highest education level: Not on file   Occupational History    Not on file   Tobacco Use    Smoking status: Never Smoker    Smokeless tobacco: Never Used   Substance and Sexual Activity    Alcohol use: Yes     Comment: socially    Drug use: No    Sexual activity: Not on file   Other Topics Concern    Not on file   Social History Narrative    Not on file     Social Determinants of Health     Financial Resource Strain:     Difficulty of Paying Living Expenses: Not on file   Food Insecurity:     Worried About Running Out of Food in the Last Year: Not on file    Ran Out of Food in the Last Year: Not on file   Transportation Needs:     Lack of Transportation (Medical): Not on file    Lack of Transportation (Non-Medical): Not on file   Physical Activity:     Days of Exercise per Week: Not on file    Minutes of Exercise per Session: Not on file   Stress:     Feeling of Stress : Not on file   Social Connections:     Frequency of Communication with Friends and Family: Not on file    Frequency of Social Gatherings with Friends and Family: Not on file    Attends [employer] Services: Not on file    Active Member of [address] [place] [address] or Organizations: Not on file    Attends Club or Organization Meetings: Not on file    Marital Status: Not on file   Intimate Partner Violence:     Fear of Current or Ex-Partner: Not on file    Emotionally Abused: Not on file    Physically Abused: Not on file    Sexually Abused: Not on file   Housing Stability:     Unable to Pay for Housing in the Last Year: Not on file    Number of [place] in the Last Year: Not on file    Unstable Housing in the Last Year: Not on file        Review of Systems   10 total systems reviewed and found to be negative unless otherwise noted in HPI     Physical Exam   /68   Pulse 81   Temp 99.6 °F (37.6 °C) (Oral)   Resp 17   Ht 5' 6\" (1.676 m)   Wt 221 lb 5.5 oz (100.4 kg)   LMP 04/10/2022   SpO2 96%   BMI 35.73 kg/m²      CONSTITUTIONAL: Well appearing, in no acute distress   HEAD: atraumatic, normocephalic   EYES: PERRL, No injection, discharge or scleral icterus. ENT: Moist mucous membranes. NECK: Normal ROM, NO LAD   CARDIOVASCULAR: Regular rate and rhythm. No murmurs or gallop. PULMONARY/CHEST: Airway patent. No retractions. Breath sounds clear with good air entry bilaterally.    ABDOMEN: Soft, Non-distended but diffusely tender, without guarding or rebound. SKIN: Acyanotic, warm, dry   MUSCULOSKELETAL: No swelling, tenderness or deformity   NEUROLOGICAL: Awake and oriented x 3. Pulses intact. Grossly nonfocal   Nursing note and vitals reviewed.      ED Course & Medical Decision Making   Medications   dicyclomine (BENTYL) capsule 20 mg (20 mg Oral Given 4/25/22 0405)   pantoprazole (PROTONIX) tablet 40 mg (40 mg Oral Given 4/25/22 0553)   sodium chloride flush 0.9 % injection 5-40 mL (5 mLs IntraVENous Not Given 4/25/22 0725)   sodium chloride flush 0.9 % injection 5-40 mL (has no administration in time range)   0.9 % sodium chloride infusion ( IntraVENous New Bag 4/25/22 1422)   enoxaparin (LOVENOX) injection 40 mg (0 mg SubCUTAneous Held 4/25/22 0726)   acetaminophen (TYLENOL) tablet 650 mg (650 mg Oral Given 4/25/22 1732)     Or   acetaminophen (TYLENOL) suppository 650 mg ( Rectal See Alternative 4/25/22 1732)   ondansetron (ZOFRAN) injection 4 mg (4 mg IntraVENous Given 4/25/22 1623)   0.9 % sodium chloride infusion ( IntraVENous New Bag 4/25/22 0349)   metronidazole (FLAGYL) 500 mg in 0.9% NaCl 100 mL IVPB premix (0 mg IntraVENous Stopped 4/25/22 1851)   cefepime (MAXIPIME) 2000 mg IVPB minibag (0 mg IntraVENous Stopped 4/25/22 1345)   HYDROmorphone (DILAUDID) injection 0.25 mg ( IntraVENous See Alternative 4/25/22 1422)     Or   HYDROmorphone (DILAUDID) injection 0.5 mg (0.5 mg IntraVENous Given 4/25/22 1422)   iohexol (OMNIPAQUE 240) injection 50 mL (has no administration in time range)   ondansetron (ZOFRAN) injection 4 mg (4 mg IntraVENous Given 4/24/22 2023)   0.9 % sodium chloride bolus (0 mLs IntraVENous Stopped 4/24/22 2256)   ondansetron (ZOFRAN) injection 4 mg (4 mg IntraVENous Given 4/24/22 2222)   fentaNYL (SUBLIMAZE) injection 50 mcg (50 mcg IntraVENous Given 4/24/22 2220)   0.9 % sodium chloride bolus (0 mLs IntraVENous Stopped 4/25/22 5718)   iohexol (OMNIPAQUE 240) 240 MG/ML injection (50 mLs  Given 4/25/22 5101)   iopamidol (ISOVUE-370) 76 % injection 75 mL (75 mLs IntraVENous Given 4/25/22 1106)   magnesium sulfate 2000 mg in 50 mL IVPB premix (0 mg IntraVENous Stopped 4/25/22 6021)      Labs Reviewed   CBC WITH AUTO DIFFERENTIAL - Abnormal; Notable for the following components:       Result Value    WBC 12.9 (*)     RBC 3.73 (*)     Hemoglobin 11.5 (*)     Hematocrit 35.0 (*)     Neutrophils Absolute 10.3 (*)     Monocytes Absolute 1.4 (*)     All other components within normal limits   COMPREHENSIVE METABOLIC PANEL - Abnormal; Notable for the following components:    Sodium 132 (*)     Chloride 97 (*)     CO2 20 (*)     Albumin/Globulin Ratio 0.8 (*)     Alkaline Phosphatase 132 (*)      (*)     All other components within normal limits   URINALYSIS WITH REFLEX TO CULTURE - Abnormal; Notable for the following components:    Blood, Urine TRACE-INTACT (*)     All other components within normal limits   COMPREHENSIVE METABOLIC PANEL W/ REFLEX TO MG FOR LOW K - Abnormal; Notable for the following components:    Sodium 134 (*)     CO2 19 (*)     Glucose 103 (*)     Calcium 8.2 (*)     Total Protein 6.0 (*)     Albumin 3.2 (*)     AST 86 (*)     All other components within normal limits   CBC WITH AUTO DIFFERENTIAL - Abnormal; Notable for the following components:    WBC 14.1 (*)     RBC 2.97 (*)     Hemoglobin 9.4 (*)     Hematocrit 28.1 (*)     Neutrophils Absolute 10.8 (*)     Monocytes Absolute 2.2 (*)     All other components within normal limits   MAGNESIUM - Abnormal; Notable for the following components:    Magnesium 1.50 (*)     All other components within normal limits   COMPREHENSIVE METABOLIC PANEL - Abnormal; Notable for the following components:    Sodium 134 (*)     CO2 20 (*)     Glucose 101 (*)     Total Protein 6.3 (*)     Albumin 2.8 (*)     Albumin/Globulin Ratio 0.8 (*)     AST 73 (*)     All other components within normal limits   MICROSCOPIC URINALYSIS - Abnormal; Notable for the following components:    Epithelial Cells, UA 6 (*)     All other components within normal limits   CULTURE, BLOOD 1   CULTURE, BLOOD 2   LIPASE   LACTATE, SEPSIS   LACTATE, SEPSIS   CBC WITH AUTO DIFFERENTIAL   MAGNESIUM   COMPREHENSIVE METABOLIC PANEL      CT ABDOMEN PELVIS W IV CONTRAST Additional Contrast? Oral   Final Result   No acute findings noted      Postsurgical change from partial colectomy left-sided colostomy again seen. .   Trace free air remains in the midline, similar to prior. Widespread hepatic metastasis. Retroperitoneal denisha metastasis appear   similar. Small amount of free fluid is seen within the pelvis, also similar      RECOMMENDATIONS:   Unavailable         XR CHEST PORTABLE   Final Result   No acute process. CT ABDOMEN PELVIS WO CONTRAST Additional Contrast? None    Result Date: 4/10/2022  EXAMINATION: CT OF THE ABDOMEN AND PELVIS WITHOUT CONTRAST 4/9/2022 12:39 pm TECHNIQUE: CT of the abdomen and pelvis was performed without the administration of intravenous contrast. Multiplanar reformatted images are provided for review. Dose modulation, iterative reconstruction, and/or weight based adjustment of the mA/kV was utilized to reduce the radiation dose to as low as reasonably achievable. COMPARISON: 12/23/2019 HISTORY: ORDERING SYSTEM PROVIDED HISTORY: generalized abdominal pain TECHNOLOGIST PROVIDED HISTORY: Reason for exam:->generalized abdominal pain Additional Contrast?->None Decision Support Exception - unselect if not a suspected or confirmed emergency medical condition->Emergency Medical Condition (MA) Is the patient pregnant?->No Reason for Exam: PT. C/O GENERALIZED ABD PAIN X 2 WEEKS, WITH SOME BLOOD IN STOOL WITH NAUSEA  AND EMESIS, STATES HAS HAD INCREASED PAIN WITH URINATION Relevant Medical/Surgical History: NO HX OF ABD PROBLEMS, NO HX OF SURGERY TO ABD, NO HX OF CA FINDINGS: Lower Chest: No acute infiltrate at the lung bases. Organs: Multiple hepatic lesions most consistent with metastatic disease. The largest lesion near the diaphragmatic surface of the liver measures 3.9 x 5.6 cm. Gallbladder is partially contracted. No biliary dilatation. Spleen, pancreas and adrenal glands are unremarkable. No evidence of obstructive uropathy. GI/Bowel: There is suggestion of an annular mass in the distal transverse colon concerning for underlying malignancy. No evidence of obstruction or significant inflammatory changes. Scattered colonic diverticula with no acute features. No evidence of appendicitis. No small bowel distension. Stomach and duodenal sweep are unremarkable. Small hiatal hernia. Pelvis: Trace free pelvic fluid, likely physiologic. The uterus and adnexal structures are unremarkable. Multiple calcified pelvic phleboliths. The bladder is contracted. Peritoneum/Retroperitoneum: The abdominal aorta is normal in caliber. No pathologic retroperitoneal adenopathy or upper abdominal ascites. Mesenteric adenopathy adjacent to the lesion in the transverse colon with the largest node measuring 11 x 12 mm. Bones/Soft Tissues: No acute osseous or soft tissue abnormality. Small fat containing umbilical hernia. 1. Multiple hepatic lesions most consistent with metastatic disease. Findings concerning for an annular mass in the distal transverse colon, likely a colonic primary with adjacent adenopathy. 2. No other acute findings within the abdomen or pelvis. CT CHEST WO CONTRAST    Result Date: 4/14/2022  EXAMINATION: CT OF THE CHEST WITHOUT CONTRAST 4/14/2022 12:08 pm TECHNIQUE: CT of the chest was performed without the administration of intravenous contrast. Multiplanar reformatted images are provided for review. Dose modulation, iterative reconstruction, and/or weight based adjustment of the mA/kV was utilized to reduce the radiation dose to as low as reasonably achievable.  COMPARISON: 04/13/2022 HISTORY: ORDERING SYSTEM PROVIDED HISTORY: staging colon cancer any lung mets TECHNOLOGIST PROVIDED HISTORY: Reason for exam:->staging colon cancer any lung mets Is the patient pregnant?->No FINDINGS: Mediastinum: Coronary artery calcifications are a marker of atherosclerosis. There are no enlarged thoracic lymph nodes. The left port terminates in the right atrium. Lungs/pleura: The tracheobronchial tree is patent. There is no pneumothorax. There are trace bilateral pleural effusions with bibasilar atelectasis. Otherwise, the lungs are clear. Upper Abdomen: A small hiatal hernia is noted. There are multiple low attenuating lesions throughout the liver favoring metastatic disease, limited by the lack of intravenous contrast.  This is better evaluated on the recent CT of the abdomen pelvis. A trace amount of pneumoperitoneum is present in the right upper quadrant likely from recent colectomy. Soft Tissues/Bones: Degenerative changes involve the thoracic spine. 1. Trace bilateral pleural effusions. 2. Unchanged diffuse hepatic metastases. CT ABDOMEN W CONTRAST Additional Contrast? Radiologist Recommendation    Result Date: 4/11/2022  EXAMINATION: CT ABDOMEN WITH CONTRAST 4/11/2022 9:27 am TECHNIQUE: CT of the abdomen was performed with the administration of intravenous contrast. Multiplanar reformatted images are provided for review. Dose modulation, iterative reconstruction, and/or weight based adjustment of the mA/kV was utilized to reduce the radiation dose to as low as reasonably achievable. COMPARISON: 04/09/2022 HISTORY: ORDERING SYSTEM PROVIDED HISTORY: triple phase CT of the liver to assess liver masses TECHNOLOGIST PROVIDED HISTORY: Reason for exam:->triple phase CT of the liver to assess liver masses Additional Contrast?->Radiologist Recommendation Reason for Exam: triple phase CT of the liver to assess liver masses, patient had w/o scan 4/9 FINDINGS: Lower Chest: There is no consolidation or effusion.   There is mild circumferential wall thickening involving the distal esophagus just above the GE junction with thickness approaching 8 mm. There is a small 1.3 cm necrotic lymph node within the gastrohepatic ligament. Organs: There are multiple low-attenuation soft tissue masses scattered throughout the liver. The largest is within the left hepatic dome and measures 6 by 4 cm. There is a small 13 mm cystic lesion within the pancreatic tail with Hounsfield units of 16. The remainder of the solid abdominal organs are unremarkable. GI/Bowel: There is no bowel dilatation, wall thickening or obstruction. Peritoneum/Retroperitoneum: There is no free air, free fluid or intraperitoneal inflammatory change. Bones/Soft Tissues: There is no acute fracture or aggressive osseous lesion. 1. Disseminated hepatic metastatic disease. 2. Nonspecific distal esophageal wall thickening. Endoscopy recommended to further evaluate. Neoplasm is a diagnostic consideration. RECOMMENDATIONS: Unavailable     CT ABDOMEN PELVIS W IV CONTRAST Additional Contrast? None    Result Date: 4/21/2022  EXAMINATION: CT OF THE ABDOMEN AND PELVIS WITH CONTRAST 4/21/2022 9:18 pm TECHNIQUE: CT of the abdomen and pelvis was performed with the administration of intravenous contrast. Multiplanar reformatted images are provided for review. Dose modulation, iterative reconstruction, and/or weight based adjustment of the mA/kV was utilized to reduce the radiation dose to as low as reasonably achievable. COMPARISON: 04/11/2022 HISTORY: ORDERING SYSTEM PROVIDED HISTORY: Left-sided abdominal pain. Recent diagnosis of colon cancer. Recent colostomy. TECHNOLOGIST PROVIDED HISTORY: Additional Contrast?->None Reason for exam:->Left-sided abdominal pain. Recent diagnosis of colon cancer. Recent colostomy. Decision Support Exception - unselect if not a suspected or confirmed emergency medical condition->Emergency Medical Condition (MA) Reason for Exam: Left-sided abdominal pain. Recent diagnosis of colon cancer. Recent colostomy.  FINDINGS: Lower Chest: Patchy basilar atelectasis is identified. There is a new rounded nodule identified at the left posterior sulcus measuring approximately 6 mm, axial image 16. No cardiomegaly is identified. No distal esophageal thickening is identified. Organs: Multifocal metastatic disease identified within the liver. There is a left hepatic lobe mass measuring 6.5 cm in greatest dimension, axial image 19, not significantly changed from the recent comparison. There is a right hepatic lobe lesion seen on axial image 28 measuring 2.7 cm, also unchanged from the previous comparison examination. There is a right adrenal nodule versus lymph node immediately adjacent to the right adrenal gland measuring 17 x 13 mm, previously measuring 10 x 8 mm. No splenic mass is identified. No enhancing renal mass is identified. No obstructive hydroureteronephrosis is identified. A small splenule is seen adjacent to the spleen. There is an unchanged 13 mm cystic lesion seen in the tail the pancreas. GI/Bowel: Normal appendix. No ileus or obstruction. No acute inflammatory bowel process is identified. Postoperative changes are seen related to partial colectomy with left abdominal colostomy. The anastomotic suture line at the level of the splenic flexure has no adjacent mass or fluid collection to suggest leak or recurrence. Mild stool volume noted within the colon to the level of the ostomy. No small bowel obstruction. Pelvis: The bladder appears unremarkable. The uterus and adnexal structures are grossly unremarkable. There is a small amount of free fluid identified in the pelvis. No significant pelvic lymphadenopathy is identified. Peritoneum/Retroperitoneum: There is a gastrohepatic ligament lymph node which appears necrotic measuring 16 x 13 mm in size, previously measuring 13 by 11 mm. No other definite lymphadenopathy is identified. No perihepatic ascites is identified.  Bones/Soft Tissues: No acute subcutaneous soft tissue abnormality is identified. No acute osseous abnormality is identified. Degenerative changes are seen in the spine and sacroiliac joints. No lytic or blastic foci are seen. 1. Postoperative changes seen related to partial colectomy and left abdominal colostomy, with a mild volume of stool noted within the proximal colon to the level of the ostomy site. No small bowel obstruction. 2. No fluid collection or mass is identified along the suture line at the splenic flexure. 3. Diffuse metastatic disease identified throughout the liver, with a couple index lesions stable when compared to the previous examination. No significant interval change. No perihepatic ascites. 4. Basilar atelectasis is identified. 5. There is a new 6 mm nodule seen at the left lung base posteriorly. This was not seen on the previous examination 2 weeks earlier. Recommend close attention on follow-up as a metastatic pulmonary nodule cannot be excluded. 6. The gastrohepatic ligament lymph nodes seen on the previous study appears centrally necrotic, measures 16 x 13 mm, previously measuring 13 x 11 mm. 7. There is a lymph node immediately adjacent to the right adrenal gland versus an adrenal nodule measuring 17 x 13 mm, previously measuring 10 x 8 mm felt to represent metastatic disease. XR CHEST PORTABLE    Result Date: 4/13/2022  EXAM: XR Chest, 1 View EXAM DATE/TIME: 4/13/2022 5:02 pm CLINICAL HISTORY: ORDERING SYSTEM PROVIDED  s/p port insertion  TECHNOLOGIST PROVIDED HISTORY: Reason for exam:->s/p port insertion  Reason for Exam: s/p port insertion TECHNIQUE: Frontal view of the chest. COMPARISON: 12/01/2014 FINDINGS: Lungs:  No acute findings. No consolidation. Pleural space:  No acute findings. No pneumothorax. Heart:  No acute findings. No cardiomegaly. Mediastinum:  No acute findings. Bones/joints:  No acute findings.  Tubes, lines and devices:  Left-sided chest port has been placed via the left internal jugular vein with the tip in the lower right atrium. A loop of the catheter extends cephalad in the neck and is not included on this study and therefore abnormalities of the catheter in this area, such as kinking cannot be excluded. 1.  Left-sided chest port has been placed via the left internal jugular vein with the tip in the lower right atrium. A loop of the catheter extends cephalad in the neck and is not included on this study and therefore abnormalities of the catheter in this area, such as kinking cannot be excluded. No evidence of pneumothorax. 2.  Otherwise no acute pulmonary disease or significant change noted. [place] VASCULAR ACCESS GUIDANCE    Result Date: 4/13/2022  Radiology exam is complete. No Radiologist dictation. Please follow up with ordering provider. Colonoscopy    PROCEDURES:   Procedures    ASSESSMENT AND PLAN:  [id number], Irma Rey is a 36 y.o. female history of metastatic colon cancer to the liver status post resection who presents complaining of abdominal pain associated with nausea vomiting. Patient has been seen here multiple times with similar presentation. She recently was seen here 3 days ago with a CT scan of the abdomen which was negative for any acute findings. Returns this afternoon stating that she is unable to tolerate any p.o. On exam patient has some tender to palpation abdomen which I believe is postsurgical.  Labs have been obtained and showed leukocytosis which I believe is probably secondary to dehydration from nausea vomiting. I did not think that she needed another CT scan. I consulted with surgery who agreed with this. However surgery indicated that patient be admitted for intractable nausea vomiting with dehydration. ClINICAL IMPRESSION:  1. Generalized abdominal pain        DISPOSITION    Admit   -Findings and recommendations explained to patient.  She expressed understanding and agreed with the plan.    ___________________________________________________________________________________  _________________________________________________________________________________________  This record is transcribed utilizing voice recognition technology. There are inherent limitations in this technology. In addition, there may be limitations in editing of this report. If there are any discrepancies, please contact me directly.         Pricilla Villanueva MD  04/25/22 6011

## 2022-04-25 NOTE — CARE COORDINATION
Ostomy Referral Follow-up Progress Note      NAME:  Trudi Era  MEDICAL RECORD NUMBER:  [id number]  AGE: 36 y.o. 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 Per patient, home care never arrived for f/u. Pt admitted on  for abd pain and vomiting.  -Bag change today. Objective    /75   Pulse 70   Temp 98.6 °F (37 °C) (Oral)   Resp 17   Ht 5' 6\" (1.676 m)   Wt 221 lb 5.5 oz (100.4 kg)   LMP 04/10/2022   SpO2 99%   BMI 35.73 kg/m²     Derrick Risk Score Derrick Scale Score: 19    Assessment         Colostomy LUQ Transverse (Active)   Stomal Appliance 2 piece; Changed 22 1231   Stoma  Assessment Flush;Moist;Red 22 1231   Peristomal Assessment Clean; Intact 22 1231   Treatment Bag change;Site care 22 1231   Stool Appearance Loose 22 1231   Stool Color Brown 22 1231   Stool Amount Medium 22 1231   Output (mL) 25 ml 22 1231   Number of days: 11     Pt back from CT. Complaining of L sided abd pain. Pt current pouch leaking. Removed. Pt stoma red moist and flush. Peristomal skin clean, dry, and intact. Site care provided with damp wash cloth. Barrier ring applied to stoma edges. 2-piece convex wafer cut to fit. Pt stoma oval. Good seal obtained. Warm blanket applied to help with seal.    Unable to teach pt today due to pain. Pt also drowsy from pain medication. Intake/Output Summary (Last 24 hours) at 2022 1232  Last data filed at 2022 1231  Gross per 24 hour   Intake 249 ml   Output 975 ml   Net -726 ml       Plan:   Plan for Ostomy Care: Will continue to follow for ostomy teaching.    [employer] [place] Flex convex #[id number] (2-\" YELLOW)  [employer] [place] Flex Drainable Pouch #[id number] YELLOW  Brava protective Seal thin #[id number]  Routine ostomy care -   Change pouch every 3 days & PRN. Empty when 1/3 to 1/2 full of stool or gas.      Ostomy Plan of Care  [x] Supplies/Instructions left in room  [] Patient using home supplies  [x] Brand/supplies at bedside [employer]     Current Diet: Diet NPO Exceptions are: Ice Chips, Sips of Water with Meds  Dietician consult:  Yes    Discharge Plan:  Placement for patient upon discharge: home with support    Outpatient visit plan No  Supplies given Yes   Samples requested No    Referrals:  [x]   [x] [facility]  [] Supplies  [] Other      Patient/Caregiver Teaching:  Written Instructions given to patient/family  Teaching provided:  [] Reviewed GI and A&P        [] Supplies  [] Pouch emptying      [] Manipulate closure  [] Routine Care         [x] Comment Unable to teach   [] Pouch maintenance           Level of patient/caregiver understanding able to:  [] Indicates understanding       [] Needs reinforcement  [] Unsuccessful      [x] Verbal Understanding  [] Demonstrated understanding       [] No evidence of learning  [] Refused teaching         [] N/A    Electronically signed by Luke Diaz RN, CWOCN on 4/25/2022 at 12:32 PM

## 2022-04-25 NOTE — CONSULTS
Surgery Consult Note     Kalen Mcmullen PA-C  Pt Name: Elva Bajwa  MRN: [id number]  [place]: 1980  Date of evaluation: 4/25/2022  Primary Care Physician: No primary care provider on file. Chief Complaint: left sided abdominal pain  IMPRESSIONS:   Metastatic colon cancer  S/P lap transverse colectomy with end colostomy on 4/13/22  +left sided abdominal pain that radiates to her left back  Stoma ok. +stool and gas production  Leukocytosis: WBC count: 12.9-->14.1  CT scan this AM showed no acute findings  PLANS:   Monitor and control pain  Ostomy nurse eval/care  Monitor WBC count  Antibiotics  OOB and ambulate as tolerated  SUBJECTIVE:   History of Chief Complaint:    Elva Bajwa is a 36 y.o. female who presented with increased left sided abdominal pain that radiates to her back on the left side. She is S/P a lap transverse colectomy with end colostomy for treatment of colon cancer on 4/13/22. She admits to having associated nausea and emesis also. She was discharged from the hospital on 4/19/22 after doing well. She did present back to the ER on 4/21/22 with left sided abdominal pain and nausea and workup including a CT scan was performed and no acute findings were found and she was discharged back home from the ER. Yesterday she came back tot he ER with the same symptoms. She did have an elevated WBC count of 12.9 and with ongoing symptoms was admitted for further monitoring. Lab work this AM showed her WBC count had increased from yesterday to 14.1 so it was decided to obtain an additional CT scan this AM to further eval. The CT scan showed no acute findings. Postsurgical change from partial colectomy left-sided colostomy were again seen. She denies having any other pain. Denies any CP, SOB, cough, lightheadedness, or dizziness. Past Medical History  Reviewed  has a past medical history of Asthma and Colon cancer ([facility] [address]   Past Surgical History  Reviewed has a past surgical history that includes Colonoscopy (N/A, 4/11/2022); Colonoscopy (4/11/2022); and colostomy (N/A, 4/13/2022). Medications  Prior to Admission medications    Medication Sig Start Date End Date Taking? Authorizing Provider   traMADol (ULTRAM) 50 MG tablet Take 1 tablet by mouth every 6 hours as needed for Pain (MAY TAKE 2 TABS EVERY 6 HOURS FOR SEVERE PAIN) for up to 5 days. 4/21/22 4/26/22  Danny Kaur PA-C   dicyclomine (BENTYL) 10 MG capsule Take 2 capsules by mouth every 6 hours as needed (cramps) 4/19/22   Jordan Steinberg MD   docusate sodium (COLACE) 100 MG capsule Take 1 capsule by mouth 2 times daily for 14 days 4/19/22 5/3/22  Jordan Steinberg MD   ondansetron (ZOFRAN-ODT) 4 MG disintegrating tablet Take 1 tablet by mouth every 8 hours as needed for Nausea or Vomiting 4/19/22 5/3/22  Jordan Steinberg MD   pantoprazole (PROTONIX) 40 MG tablet Take 1 tablet by mouth every morning (before breakfast) 4/20/22   Jordan Steinberg MD   polyethylene glycol (GLYCOLAX) 17 g packet Take 17 g by mouth daily 4/20/22 5/20/22  Jordan Steinberg MD   oxyCODONE (ROXICODONE) 5 MG immediate release tablet Take 1 tablet by mouth every 6 hours as needed for Pain for up to 7 days. Intended supply: 5 days. Take lowest dose possible to manage pain 4/19/22 4/26/22  JOSE ALFREDO Keys    Scheduled Meds:   pantoprazole  40 mg Oral QAM AC    sodium chloride flush  5-40 mL IntraVENous 2 times per day    enoxaparin  40 mg SubCUTAneous Daily    metroNIDAZOLE  500 mg IntraVENous Q8H    cefepime  2,000 mg IntraVENous Q12H     Continuous Infusions:   sodium chloride       PRN Meds:.dicyclomine, sodium chloride flush, sodium chloride, acetaminophen **OR** acetaminophen, ondansetron, HYDROmorphone **OR** HYDROmorphone, iohexol  Allergies  is allergic to codeine and pcn [penicillins]. Family History  Reviewedfamily history is not on file. Social History   reports that she has never smoked.  She has never used smokeless tobacco. She reports current alcohol use. She reports that she does not use drugs. EDUCATION  Patient educated about their illness/diagnosis, stated above, and all questions answered. We discussed the importance of nutrition, medications they are taking, and healthy lifestyle. Review of Systems:  General Denies any fever or chills  HEENT Denies any diplopia, tinnitus or vertigo  Resp Denies any shortness of breath, cough or wheezing  Cardiac Denies any chest pain, palpitations, claudication or edema  GI Denies any melena, hematochezia, hematemesis or pyrosis   Denies any frequency, urgency, hesitancy or incontinence  Heme Denies bruising or bleeding easily  Neuro Denies any focal motor or sensory deficits  OBJECTIVE:   VITALS:  height is 5' 6\" (1.676 m) and weight is 221 lb 5.5 oz (100.4 kg). Her oral temperature is 98.6 °F (37 °C). Her blood pressure is 110/75 and her pulse is 70. Her respiration is 17 and oxygen saturation is 99%. CONSTITUTIONAL: Alert and oriented times 3, no acute distress and cooperative to examination with proper mood and affect. SKIN: Skin color, texture, turgor normal. No rashes or lesions. LYMPH: no cervical nodes, no inguinal nodes  HEENT: Head is normocephalic, atraumatic. EOMI, PERRLA. NECK: Supple, symmetrical, trachea midline, no adenopathy, thyroid symmetric, not enlarged and no tenderness, skin normal.  CHEST/LUNGS: chest symmetric with normal A/P diameter, normal respiratory rate and rhythm  CARDIOVASCULAR: Heart sounds are normal.  Regular rate and rhythm   ABDOMEN: Normal shape. Tenderness: Left sided  RECTAL: deferred, not clinically indicated  NEUROLOGIC: There are no focalizing motor or sensory deficits. CN II-XII are grossly intact. Cleophus Harcourt EXTREMITIES: no cyanosis, no clubbing and no edema.   LABS:     Recent Labs     04/24/22  1959 04/25/22  0336 04/25/22  0410 04/25/22  0602   WBC 12.9*  --   --  14.1*   HGB 11.5*  --   --  9.4*   HCT 35.0*  --   --  28.1*     --   --  291   * 134*  -- 134*   K 4.1 4.0  --  3.6   CL 97* 102  --  102   CO2 20* 19*  --  20*   BUN 8 7  --  8   CREATININE 0.7 0.6  --  0.6   MG  --   --   --  1.50*   CALCIUM 9.6 8.2*  --  8.4   * 86*  --  73*   ALT 37 30  --  28   BILITOT 0.5 0.6  --  0.6   NITRU  --   --  Negative  --    COLORU  --   --  Yellow  --      Recent Labs     04/24/22 1959 04/25/22 0336 04/25/22 0602   ALKPHOS 132*   < > 99   ALT 37   < > 28   *   < > 73*   BILITOT 0.5   < > 0.6   LABALBU 3.4   < > 2.8*   LIPASE 17.0  --   --     < > = values in this interval not displayed. CBC:   Lab Results   Component Value Date    WBC 14.1 04/25/2022    RBC 2.97 04/25/2022    HGB 9.4 04/25/2022    HCT 28.1 04/25/2022    MCV 94.7 04/25/2022    MCH 31.6 04/25/2022    MCHC 33.4 04/25/2022    RDW 13.8 04/25/2022     04/25/2022    MPV 7.6 04/25/2022     CMP:    Lab Results   Component Value Date     04/25/2022    K 3.6 04/25/2022    K 4.0 04/25/2022     04/25/2022    CO2 20 04/25/2022    BUN 8 04/25/2022    CREATININE 0.6 04/25/2022    GFRAA >60 04/25/2022    GFRAA >60 06/06/2013    AGRATIO 0.8 04/25/2022    LABGLOM >60 04/25/2022    GLUCOSE 101 04/25/2022    PROT 6.3 04/25/2022    LABALBU 2.8 04/25/2022    CALCIUM 8.4 04/25/2022    BILITOT 0.6 04/25/2022    ALKPHOS 99 04/25/2022    AST 73 04/25/2022    ALT 28 04/25/2022     Urine Culture:  No components found for: CURINE  Blood Culture:  No components found for: CBLOOD, CFUNGUSBL  RADIOLOGY:     CT scan abd/pelvis (4/25/22): No acute findings noted     Postsurgical change from partial colectomy left-sided colostomy again seen. .   Trace free air remains in the midline, similar to prior. Widespread hepatic metastasis.  Retroperitoneal denisha metastasis appear   similar.  Small amount of free fluid is seen within the pelvis, also similar         Thank you for the interesting evaluation. Further recommendations to follow. Everton Prado PA-C  General and Vascular Surgery [phone]  Electronically signed by Dali Oswald PA-C on 4/25/2022 at 12:04 PM    Agree with above note. The patient was personally seen and examined. Malik Card is a 35 yo female with recent laparoscopic transverse colectomy with colostomy on 4/13/22 for metastatic colon CA. She was in the ED four days ago with nausea, vomiting, and left upper quadrant pain and had an unremarkable workup. She presented back to the ED due to worsening symptoms. Had a temperature of 101.5 yesterday. Mild distress, alert and oriented  Normal respiratory effort, no accessory muscle use  Abd soft, ND, mild LUQ tenderness, no peritonitis, stoma pink  Ext: no cyanosis or clubbing    WBC up to 14.1 from 12.9  Cr 0.6  UA negative for WBCs, leuk esterase, or nitrites    CT abd/pelvis from today personally reviewed, showing no obvious intraabdominal source of inflammation/infection    A/P: 35 yo female with leukocytosis, nausea/vomiting/abd pain of unclear etiology    CT abd/pelvis reassuring - does not appear to be from remnant colon/staple line.   Blood cultures pending  Continue cefepime  Will follow    Blade Alex MD

## 2022-04-25 NOTE — PROGRESS NOTES
CT called about patient starting oral contrast. Pt complaining of nausea and dry heaving. PRN zofran given. CT made aware and said that pt can slowly sip on contrast and will send for her at 1100 for the scan.    Electronically signed by Kallie Medrano RN on 4/25/2022 at 9:55 AM

## 2022-04-25 NOTE — CARE COORDINATION
INITIAL CASE MANAGEMENT ASSESSMENT    Reviewed chart, met with patient to assess possible discharge needs. Explained Case Management role/services. Living Situation: lives at home- has 6 HEYD- states she is able to live on the main level- her daughter is there to assist as needed-- she is in and out - not there all the time    ADLs: fairly independent- daughter assists as needed     DME: none reported- states she is interested in a shower chair at [facility] -- referral to Quentin woodall/ [facility]- will need DME order    PT/OT Recs: not ordered at present      Active Services: active w/ [facility]. # [phone] - confirmed active services- PT and VN     Transportation: states her daughter can transport home at [facility]      Medications: no issues    PCP: [facility]      HD/PD: n/a    PLAN/COMMENTS: patient plans to return home at dc- denies dc needs at present - resume home care at dc    SW/CM provided contact information for patient or family to call with any questions. SW/CM will follow and assist as needed.   Electronically signed by Jermaine Hernandez on 4/25/2022 at 3:32 PM  #[phone]

## 2022-04-25 NOTE — ED NOTES
Patient states her colostomy bag came off, large amount of stool present. New colostomy bag placed. Patient states she is supposed to have home care and the nurse has not come yet.       Ananth Kumar RN  04/24/22 2124

## 2022-04-25 NOTE — PLAN OF CARE
Problem: Discharge Planning  Goal: Discharge to home or other facility with appropriate resources  Outcome: Progressing  Note: Patient will be discharged to appropriate level of care      Problem: Skin/Tissue Integrity  Goal: Absence of new skin breakdown  Description: 1. Monitor for areas of redness and/or skin breakdown  2. Assess vascular access sites hourly  3. Every 4-6 hours minimum:  Change oxygen saturation probe site  4. Every 4-6 hours:  If on nasal continuous positive airway pressure, respiratory therapy assess nares and determine need for appliance change or resting period.   Outcome: Progressing  Note: Patient will be absent of new skin breakdown     Problem: Safety - Adult  Goal: Free from fall injury  Outcome: Progressing  Note: Patient will be free from falls     Problem: ABCDS Injury Assessment  Goal: Absence of physical injury  Outcome: Progressing  Note: Patient will be absent of physical injury

## 2022-04-25 NOTE — PLAN OF CARE
Problem: Discharge Planning  Goal: Discharge to home or other facility with appropriate resources  4/25/2022 1056 by Deidre Douglas RN  Outcome: Progressing  Flowsheets (Taken 4/25/2022 0453 by Jamil Leon RN)  Discharge to home or other facility with appropriate resources: Identify barriers to discharge with patient and caregiver     Problem: Skin/Tissue Integrity  Goal: Absence of new skin breakdown  Description: 1. Monitor for areas of redness and/or skin breakdown  2. Assess vascular access sites hourly  3. Every 4-6 hours minimum:  Change oxygen saturation probe site  4. Every 4-6 hours:  If on nasal continuous positive airway pressure, respiratory therapy assess nares and determine need for appliance change or resting period.   4/25/2022 1056 by Deidre Douglas RN  Outcome: Progressing  Note: Pt absent from new skin breakdown      Problem: Safety - Adult  Goal: Free from fall injury  4/25/2022 1056 by Deidre Douglas RN  Outcome: Progressing  Flowsheets (Taken 4/25/2022 1055)  Free From Fall Injury: Instruct family/caregiver on patient safety     Problem: ABCDS Injury Assessment  Goal: Absence of physical injury  4/25/2022 1056 by Deidre Douglas RN  Outcome: Progressing  Note: Pt absent from physical injury      Problem: Pain  Goal: Verbalizes/displays adequate comfort level or baseline comfort level  Outcome: Progressing  Flowsheets (Taken 4/25/2022 1056)  Verbalizes/displays adequate comfort level or baseline comfort level:   Encourage patient to monitor pain and request assistance   Assess pain using appropriate pain scale   Administer analgesics based on type and severity of pain and evaluate response   Implement non-pharmacological measures as appropriate and evaluate response     Problem: ABCDS Injury Assessment  Goal: Absence of physical injury  4/25/2022 1056 by Deidre Douglas RN  Outcome: Progressing  Note: Pt absent from physical injury

## 2022-04-25 NOTE — H&P
Hospital Medicine History & Physical      PCP: No primary care provider on file. Date of Admission: 4/24/2022    Date of Service: Pt seen/examined on 4/25/2022 and admitted to inpatient    Chief Complaint: Abdominal pain, nausea, vomiting, fever and chills      History Of Present Illness: The patient is a 36 y.o. female with recent past medical history of stage IV metastatic colon cancer to the liver with recent resection who presents to [facility] with concern that she has been having increasing and worsening nausea and vomiting with generalized abdominal pain for least the last 4 days even since being discharged from the hospital more recently on the 19th. She was unable to take any of the antibiotics that she was prescribed previously and notes that she has also had intermittent fever and chills at home. She has had increasing levels of fatigue and has had decreased oral intake in general.  She notes that the pain has become more unbearable as the last few days have gone. She was recently in the ED and had a CT scan finding with negative results and patient was initially able to take food and drink and go home but noted that the pain and nausea vomiting continue to persist and came back even worse over the course of the next day or so. She is continue to have fever and chills at home, admits to persistent worsening fatigue. She denies any other recent symptoms of dizziness, syncope, dysuria, blood in urine/sputum/ostomy output, rashes, headache, chest pain, shortness of breath.     Past Medical History:        Diagnosis Date    Asthma     Colon cancer [facility])        Past Surgical History:        Procedure Laterality Date    COLONOSCOPY N/A 4/11/2022    COLONOSCOPY WITH BIOPSY performed by Nisreen De Dios MD at [address] [facility] 4/11/2022    COLONOSCOPY SUBMUCOSAL tattoo  INJECTION performed by Davy Crane MD at [address] 4/13/2022    LAPAROSCOPIC TRANSVERSE COLECTOMY WITH COLOSTOMY, INSERTION OF PORT A CATHETER, LAPAROSCOPIC LIVER BIOPSY performed by Willa Hughes MD at [place] [address]       Medications Prior to Admission:    Prior to Admission medications    Medication Sig Start Date End Date Taking? Authorizing Provider   traMADol (ULTRAM) 50 MG tablet Take 1 tablet by mouth every 6 hours as needed for Pain (MAY TAKE 2 TABS EVERY 6 HOURS FOR SEVERE PAIN) for up to 5 days. 4/21/22 4/26/22  Clara Blankenship PA-C   dicyclomine (BENTYL) 10 MG capsule Take 2 capsules by mouth every 6 hours as needed (cramps) 4/19/22   Brandon Holding, MD   docusate sodium (COLACE) 100 MG capsule Take 1 capsule by mouth 2 times daily for 14 days 4/19/22 5/3/22  Brandon Holding, MD   ondansetron (ZOFRAN-ODT) 4 MG disintegrating tablet Take 1 tablet by mouth every 8 hours as needed for Nausea or Vomiting 4/19/22 5/3/22  Brandon Holding, MD   pantoprazole (PROTONIX) 40 MG tablet Take 1 tablet by mouth every morning (before breakfast) 4/20/22   Brandon Holding, MD   polyethylene glycol (GLYCOLAX) 17 g packet Take 17 g by mouth daily 4/20/22 5/20/22  Brandon Holding, MD   oxyCODONE (ROXICODONE) 5 MG immediate release tablet Take 1 tablet by mouth every 6 hours as needed for Pain for up to 7 days. Intended supply: 5 days. Take lowest dose possible to manage pain 4/19/22 4/26/22  JOSE ALFREDO Mcpherson       Allergies:  Codeine and Pcn [penicillins]    Social History:  The patient currently lives home    TOBACCO:   reports that she has never smoked. She has never used smokeless tobacco.  ETOH:   reports current alcohol use. Family History:  Reviewed in detail and negative for DM, Early CAD, Cancer, CVA. Positive as follows:    No family history on file. REVIEW OF SYSTEMS:   as noted in the HPI.  All other systems reviewed and negative. PHYSICAL EXAM:    /75   Pulse 70   Temp 98.6 °F (37 °C) (Oral)   Resp 17   Ht 5' 6\" (1.676 m)   Wt 221 lb 5.5 oz (100.4 kg)   LMP 04/10/2022   SpO2 99%   BMI 35.73 kg/m²     General appearance: Fatigued appearing, chronically ill-appearing, warm, no acute respiratory distress, still having abdominal pain  HEENT Normal cephalic, atraumatic without obvious deformity. Pupils equal, round, and reactive to light. Extra ocular muscles intact. Conjunctivae/corneas clear. Dry mucous membrane  Neck: Supple, no JVD  Lungs: Diminished breath sounds but overall clear  Heart: Tachycardic, regular rhythm, no murmurs  Abdomen: Generalized abdominal tenderness on palpation, mild to moderate tenderness, unable to focus on a specific area. Active bowel sounds, nondistended, colostomy noted  Extremities: Trace bilateral lower extremity pitting edema symmetrically  Skin: No rashes  Neurologic: Grossly intact neurologically  Mental status: Alert, oriented, thought content appropriate. Capillary Refill: Acceptable  < 3 seconds  Peripheral Pulses: +3 Easily felt, not easily obliterated with pressure      CT abdomen pelvis from 4/21/2022:  1. Postoperative changes seen related to partial colectomy and left abdominal   colostomy, with a mild volume of stool noted within the proximal colon to the   level of the ostomy site.  No small bowel obstruction. 2. No fluid collection or mass is identified along the suture line at the   splenic flexure. 3. Diffuse metastatic disease identified throughout the liver, with a couple   index lesions stable when compared to the previous examination.  No   significant interval change.  No perihepatic ascites. 4. Basilar atelectasis is identified.    5. There is a new 6 mm nodule seen at the left lung base posteriorly.  This   was not seen on the previous examination 2 weeks earlier.  Recommend close   attention on follow-up as a metastatic pulmonary nodule cannot be excluded. 6. The gastrohepatic ligament lymph nodes seen on the previous study appears   centrally necrotic, measures 16 x 13 mm, previously measuring 13 x 11 mm.   7. There is a lymph node immediately adjacent to the right adrenal gland   versus an adrenal nodule measuring 17 x 13 mm, previously measuring 10 x 8 mm   felt to represent metastatic disease. CBC   Recent Labs     04/24/22 1959 04/25/22  0602   WBC 12.9* 14.1*   HGB 11.5* 9.4*   HCT 35.0* 28.1*    291      RENAL  Recent Labs     04/24/22 1959 04/25/22  0336 04/25/22  0602   * 134* 134*   K 4.1 4.0 3.6   CL 97* 102 102   CO2 20* 19* 20*   BUN 8 7 8   CREATININE 0.7 0.6 0.6     LFT'S  Recent Labs     04/24/22 1959 04/25/22  0336 04/25/22  0602   * 86* 73*   ALT 37 30 28   BILITOT 0.5 0.6 0.6   ALKPHOS 132* 104 99     COAG  No results for input(s): INR in the last 72 hours. CARDIAC ENZYMES  No results for input(s): CKTOTAL, CKMB, CKMBINDEX, TROPONINI in the last 72 hours.     U/A:    Lab Results   Component Value Date    COLORU Yellow 04/25/2022    WBCUA 1 04/25/2022    RBCUA 2 04/25/2022    MUCUS 1+ 04/09/2022    BACTERIA 2+ 04/09/2022    CLARITYU Clear 04/25/2022    SPECGRAV 1.015 04/25/2022    LEUKOCYTESUR Negative 04/25/2022    BLOODU TRACE-INTACT 04/25/2022    GLUCOSEU Negative 04/25/2022    AMORPHOUS 1+ 02/20/2015       ABG  No results found for: SIL8MUM, BEART, U7XOEXIA, PHART, THGBART, AYJ1MRI, PO2ART, BKE7WAU        Active Hospital Problems    Diagnosis Date Noted    Malignancy ([facility] [address] [C80.1] 04/25/2022     Priority: Medium    Sepsis ([facility] [address] [A41.9] 04/25/2022     Priority: Medium    Abdominal pain [R10.9] 04/24/2022     Priority: Medium         PHYSICIANS CERTIFICATION:    I certify that Yoli Thompson is expected to be hospitalized for greater than 2 midnights based on the following assessment and plan:      ASSESSMENT/PLAN:  · Sepsis  · Abdominal pain  · Malignancy    Plan:  · Due to patient's fever and recent diagnosis of malignancy, patient's abdominal pain is concerning but was not given a repeat scan. Uncertain etiology of sepsis syndrome but will treat at this time with antibiotics since feel patient will benefit from this  · Start patient on cefepime and Flagyl  · Keeping patient n.p.o.  · Dilaudid as needed on a pain scale  · Start patient on Bentyl  · 100/h of normal saline x10 hours  · General surgery consult for the morning  · Keeping patient n.p.o.  · Repeat labs daily    DVT Prophylaxis: Lovenox  Diet: Diet NPO Exceptions are: Ice Chips, Sips of Water with Meds  Code Status: Full Code  PT/OT Eval Status: Ambulatory    Dispo -pending clinical course       Vladimir Guardado, DO    Thank you No primary care provider on file. for the opportunity to be involved in this patient's care. If you have any questions or concerns please feel free to contact me at [phone].

## 2022-04-25 NOTE — CARE COORDINATION
04/25/22 0919   Readmission Assessment   Number of Days since last admission? 1-7 days   Previous Disposition Home with Home Health   Who is being [place] [place]er   (chart review)   What was the patient's/caregiver's perception as to why they think they needed to return back to the hospital? Other (Comment)  (abd pain, n/v, leaking)   Did you visit your Primary Care Physician after you left the hospital, before you returned this time? No   Why weren't you able to visit your PCP? Did not have an appointment   Did you see a specialist, such as Cardiac, Pulmonary, Orthopedic Physician, etc. after you left the hospital? Other (Comment)  (has appt 4/28/22)   Who advised the patient to return to the hospital? Self-referral   Does the patient report anything that got in the way of taking their medications? No   In our efforts to provide the best possible care to you and others like you, can you think of anything that we could have done to help you after you left the hospital the first time, so that you might not have needed to return so soon?  Other (Comment)

## 2022-04-25 NOTE — PROGRESS NOTES
Patient to floor from ED, A/Ox4, vital signs stable, has a temperature of 101.5, Tylenol administered. Patients colostomy bag continues to leak after reinforcement pieces applied. This RN changed colostomy bag and added additional reinforcement tape to new bag, placed towels around bag in case of new leakage. IV fluids infusing, patient complaining of nausea and abdominal pain, Zofran and Bentyl administered. Patient up to bathroom with this RN, contact guard assist, urinated and sample sent to lab. Bed alarm on, call light in reach, will continue to monitor.

## 2022-04-25 NOTE — DISCHARGE INSTR - COC
Continuity of Care Form    Patient Name: Elisha Carlos   :  1980  MRN:  [id number]    Admit date:  2022  Discharge date:  2022    Code Status Order: Full Code   Advance Directives:      Admitting Physician:  Alice Peabody, DO  PCP: No primary care provider on file. Discharging Nurse: Janet Hogan RN, MSN  Discharging Hospital Unit/Room#: M0I-1070/0812-92  Discharging Unit Phone Number: [phone]    Emergency Contact:   Extended Emergency Contact Information  Primary Emergency Contact: Franchesca Simmons  Address: [address] [place] [address].  Jaziel Rubalcava, [address] [facility]  Home Phone: [phone]  Relation: Other  Secondary Emergency Contact: Monse Garcia  Home Phone: [phone]  Relation: Brother/Sister    Past Surgical History:  Past Surgical History:   Procedure Laterality Date    COLONOSCOPY N/A 2022    COLONOSCOPY WITH BIOPSY performed by Valdemar Cronin MD at [address]  2022    COLONOSCOPY SUBMUCOSAL tattoo  INJECTION performed by Valdemar Cronin MD at [address] 2022    LAPAROSCOPIC TRANSVERSE COLECTOMY WITH COLOSTOMY, INSERTION OF PORT A CATHETER, LAPAROSCOPIC LIVER BIOPSY performed by Sheila Flores MD at [place] [address]       Immunization History:   Immunization History   Administered Date(s) Administered    COVID-19, [place] top, DO NOT Dilute, Romeo-Sucrose, 12+ yrs, PF, 30 mcg/0.3 mL dose 2022       Active Problems:  Patient Active Problem List   Diagnosis Code    Lower GI bleed K92.2    Trichimoniasis A59.9    Liver lesion K76.9    Colonic mass K63.89    Rectal bleeding K62.5    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 ([address] C18.9, C78.7    Abdominal pain R10.9    Malignancy ([address] C80.1    Sepsis ([facility] [address] A41.9       Isolation/Infection:   Isolation            No Isolation          Patient Infection Status       None to display            Nurse Assessment:  Last Vital Signs: BP (!) 180/64   Pulse 82 Temp 98.8 °F (37.1 °C) (Oral)   Resp 18   Ht 5' 6\" (1.676 m)   Wt 221 lb 5.5 oz (100.4 kg)   LMP 04/10/2022   SpO2 98%   BMI 35.73 kg/m²     Last documented pain score (0-10 scale): Pain Level: 0  Last Weight:   Wt Readings from Last 1 Encounters:   04/25/22 221 lb 5.5 oz (100.4 kg)     Mental Status:  oriented, alert, coherent, logical, thought processes intact, and able to concentrate and follow conversation    IV Access:  - None  - deaccessed L chest port     Nursing Mobility/ADLs:  Walking   Independent  Transfer  Independent  [facility]  Med Delivery   whole    Wound Care Documentation and Therapy:  Incision 04/13/22 Chest Left;Upper (Active)   Dressing Status Clean;Dry; Intact 04/25/22 0930   Dressing/Treatment Surgical glue 04/25/22 0930   Closure Surgical glue 04/25/22 0930   Incision Assessment Dry 04/25/22 0930   Drainage Amount None 04/25/22 0930   Number of days: 11       Incision 04/13/22 Abdomen Lower;Medial (Active)   Dressing Status Clean;Dry; Intact 04/25/22 0930   Dressing/Treatment Surgical glue 04/25/22 0930   Closure Surgical glue 04/25/22 0930   Drainage Amount None 04/25/22 0930   Number of days: 11        Elimination:  Continence:    Bowel: Yes; Transverse colostomy  Bladder: Yes  Urinary Catheter: None   Colostomy/Ileostomy/Ileal Conduit: Yes  Colostomy LUQ Transverse-Stomal Appliance: 2 piece,Changed  Colostomy LUQ Transverse-Stoma  Assessment: Flush,Moist,Red  Colostomy LUQ Transverse-Peristomal Assessment: Clean,Intact  Colostomy LUQ Transverse-Treatment: Bag change,Site care (barrier ring)  Colostomy LUQ Transverse-Stool Appearance: Loose  Colostomy LUQ Transverse-Stool Color: Brown  Colostomy LUQ Transverse-Stool Amount: Medium  Colostomy LUQ Transverse-Output (mL): 25 ml    SenSura Talcott Flex convex #[id number] (5/8-2-1/16\" YELLOW)  Sensura Daniel Flex Drainable Pouch #[id number] YELLOW  Inge Pat protective Seal thin [id number]  [facility]\" long #[id number]  Brava Elastic Barrier Strips [id number]      Intake/Output Summary (Last 24 hours) at 4/25/2022 1246  Last data filed at 4/25/2022 1231  Gross per 24 hour   Intake 249 ml   Output 975 ml   Net -726 ml     I/O last 3 completed shifts: In: 249 [I.V.:249]  Out: 400 [Urine:400]    Safety Concerns:     None    Impairments/Disabilities:      None    Nutrition Therapy:  Current Nutrition Therapy:   - Oral Diet:  General    Routes of Feeding: Oral  Liquids: No Restrictions  Daily Fluid Restriction: no  Last Modified Barium Swallow with Video (Video Swallowing Test): not done    Treatments at the Time of Hospital Discharge:   Respiratory Treatments: None  Oxygen Therapy:  is not on home oxygen therapy. Ventilator:    - No ventilator support    Rehab Therapies: None  Weight Bearing Status/Restrictions: No weight bearing restrictions  Other Medical Equipment (for information only, NOT a DME order):  ostomy to LUQ   Other Treatments: None    Patient's personal belongings (please select all that are sent with patient):  None    RN SIGNATURE:  Electronically signed by Diamond Hernandez RN on 4/29/22 at 1:28 PM EDT    CASE MANAGEMENT/SOCIAL WORK SECTION    Inpatient Status Date: ***    Readmission Risk Assessment Score:  Readmission Risk              Risk of Unplanned Readmission:  18         Discharging to Facility/ Agency   Name: [facility]  Address:   [address] [place] [address]  Phone:  [phone]  Fax:  [phone]    Dialysis Facility (if applicable)   Name:  Address:  Dialysis Schedule:  Phone:  Fax:    / signature: Electronically signed by Tan Reno on 4/29/22 at 12:27 PM EDT    PHYSICIAN SECTION    Prognosis: Fair    Condition at Discharge: Stable    Rehab Potential (if transferring to Rehab):  Fair    Recommended Labs or Other Treatments After Discharge: PTOT and RN    Physician Certification: I certify the above information and transfer of Cameron Wild  is necessary for the continuing treatment of the diagnosis listed and that she requires Home Care for less 30 days.      Update Admission H&P: No change in H&P    PHYSICIAN SIGNATURE:  Electronically signed by Javid Eubanks MD on 4/29/22 at 4:17 PM EDT

## 2022-04-26 LAB
A/G RATIO: 0.8 (ref 1.1–2.2)
ALBUMIN SERPL-MCNC: 2.9 G/DL (ref 3.4–5)
ALP BLD-CCNC: 97 U/L (ref 40–129)
ALT SERPL-CCNC: 23 U/L (ref 10–40)
ANION GAP SERPL CALCULATED.3IONS-SCNC: 15 MMOL/L (ref 3–16)
AST SERPL-CCNC: 43 U/L (ref 15–37)
BASOPHILS ABSOLUTE: 0 K/UL (ref 0–0.2)
BASOPHILS RELATIVE PERCENT: 0.3 %
BILIRUB SERPL-MCNC: 0.7 MG/DL (ref 0–1)
BUN BLDV-MCNC: 7 MG/DL (ref 7–20)
CALCIUM SERPL-MCNC: 8.5 MG/DL (ref 8.3–10.6)
CHLORIDE BLD-SCNC: 100 MMOL/L (ref 99–110)
CO2: 19 MMOL/L (ref 21–32)
CREAT SERPL-MCNC: 0.7 MG/DL (ref 0.6–1.1)
EOSINOPHILS ABSOLUTE: 0.1 K/UL (ref 0–0.6)
EOSINOPHILS RELATIVE PERCENT: 0.5 %
GFR AFRICAN AMERICAN: >60
GFR NON-AFRICAN AMERICAN: >60
GLUCOSE BLD-MCNC: 88 MG/DL (ref 70–99)
HCT VFR BLD CALC: 27.7 % (ref 36–48)
HEMATOLOGY PATH CONSULT: NO
HEMOGLOBIN: 9.3 G/DL (ref 12–16)
LYMPHOCYTES ABSOLUTE: 0.8 K/UL (ref 1–5.1)
LYMPHOCYTES RELATIVE PERCENT: 6.3 %
MAGNESIUM: 2.3 MG/DL (ref 1.8–2.4)
MCH RBC QN AUTO: 31.5 PG (ref 26–34)
MCHC RBC AUTO-ENTMCNC: 33.5 G/DL (ref 31–36)
MCV RBC AUTO: 94.1 FL (ref 80–100)
MONOCYTES ABSOLUTE: 2 K/UL (ref 0–1.3)
MONOCYTES RELATIVE PERCENT: 15.1 %
NEUTROPHILS ABSOLUTE: 10.3 K/UL (ref 1.7–7.7)
NEUTROPHILS RELATIVE PERCENT: 77.8 %
PDW BLD-RTO: 13.9 % (ref 12.4–15.4)
PLATELET # BLD: 293 K/UL (ref 135–450)
PMV BLD AUTO: 8.1 FL (ref 5–10.5)
POTASSIUM SERPL-SCNC: 3.7 MMOL/L (ref 3.5–5.1)
RBC # BLD: 2.95 M/UL (ref 4–5.2)
SODIUM BLD-SCNC: 134 MMOL/L (ref 136–145)
TOTAL PROTEIN: 6.7 G/DL (ref 6.4–8.2)
WBC # BLD: 13.3 K/UL (ref 4–11)

## 2022-04-26 PROCEDURE — 6360000002 HC RX W HCPCS: Performed by: HOSPITALIST

## 2022-04-26 PROCEDURE — 2500000003 HC RX 250 WO HCPCS: Performed by: STUDENT IN AN ORGANIZED HEALTH CARE EDUCATION/TRAINING PROGRAM

## 2022-04-26 PROCEDURE — 94760 N-INVAS EAR/PLS OXIMETRY 1: CPT

## 2022-04-26 PROCEDURE — 85025 COMPLETE CBC W/AUTO DIFF WBC: CPT

## 2022-04-26 PROCEDURE — 83735 ASSAY OF MAGNESIUM: CPT

## 2022-04-26 PROCEDURE — 1200000000 HC SEMI PRIVATE

## 2022-04-26 PROCEDURE — 6370000000 HC RX 637 (ALT 250 FOR IP): Performed by: STUDENT IN AN ORGANIZED HEALTH CARE EDUCATION/TRAINING PROGRAM

## 2022-04-26 PROCEDURE — APPNB45 APP NON BILLABLE 31-45 MINUTES: Performed by: PHYSICIAN ASSISTANT

## 2022-04-26 PROCEDURE — APPSS45 APP SPLIT SHARED TIME 31-45 MINUTES: Performed by: PHYSICIAN ASSISTANT

## 2022-04-26 PROCEDURE — 80053 COMPREHEN METABOLIC PANEL: CPT

## 2022-04-26 PROCEDURE — 6360000002 HC RX W HCPCS: Performed by: STUDENT IN AN ORGANIZED HEALTH CARE EDUCATION/TRAINING PROGRAM

## 2022-04-26 PROCEDURE — 36415 COLL VENOUS BLD VENIPUNCTURE: CPT

## 2022-04-26 PROCEDURE — 2580000003 HC RX 258: Performed by: STUDENT IN AN ORGANIZED HEALTH CARE EDUCATION/TRAINING PROGRAM

## 2022-04-26 PROCEDURE — 99024 POSTOP FOLLOW-UP VISIT: CPT | Performed by: PHYSICIAN ASSISTANT

## 2022-04-26 PROCEDURE — 99024 POSTOP FOLLOW-UP VISIT: CPT | Performed by: SURGERY

## 2022-04-26 RX ORDER — PROCHLORPERAZINE EDISYLATE 5 MG/ML
5 INJECTION INTRAMUSCULAR; INTRAVENOUS EVERY 4 HOURS PRN
Status: DISCONTINUED | OUTPATIENT
Start: 2022-04-26 | End: 2022-04-29 | Stop reason: HOSPADM

## 2022-04-26 RX ADMIN — METRONIDAZOLE 500 MG: 500 INJECTION, SOLUTION INTRAVENOUS at 17:03

## 2022-04-26 RX ADMIN — HYDROMORPHONE HYDROCHLORIDE 0.25 MG: 1 INJECTION, SOLUTION INTRAMUSCULAR; INTRAVENOUS; SUBCUTANEOUS at 17:31

## 2022-04-26 RX ADMIN — ACETAMINOPHEN 650 MG: 325 TABLET ORAL at 19:48

## 2022-04-26 RX ADMIN — CEFEPIME HYDROCHLORIDE 2000 MG: 2 INJECTION, POWDER, FOR SOLUTION INTRAVENOUS at 00:25

## 2022-04-26 RX ADMIN — CEFEPIME HYDROCHLORIDE 2000 MG: 2 INJECTION, POWDER, FOR SOLUTION INTRAVENOUS at 13:20

## 2022-04-26 RX ADMIN — METRONIDAZOLE 500 MG: 500 INJECTION, SOLUTION INTRAVENOUS at 08:52

## 2022-04-26 RX ADMIN — METRONIDAZOLE 500 MG: 500 INJECTION, SOLUTION INTRAVENOUS at 01:13

## 2022-04-26 RX ADMIN — PROCHLORPERAZINE EDISYLATE 5 MG: 5 INJECTION INTRAMUSCULAR; INTRAVENOUS at 19:53

## 2022-04-26 RX ADMIN — ENOXAPARIN SODIUM 40 MG: 100 INJECTION SUBCUTANEOUS at 08:53

## 2022-04-26 RX ADMIN — ONDANSETRON 4 MG: 2 INJECTION INTRAMUSCULAR; INTRAVENOUS at 17:01

## 2022-04-26 RX ADMIN — PANTOPRAZOLE SODIUM 40 MG: 40 TABLET, DELAYED RELEASE ORAL at 05:43

## 2022-04-26 RX ADMIN — PROCHLORPERAZINE EDISYLATE 5 MG: 5 INJECTION INTRAMUSCULAR; INTRAVENOUS at 09:41

## 2022-04-26 RX ADMIN — ONDANSETRON 4 MG: 2 INJECTION INTRAMUSCULAR; INTRAVENOUS at 00:27

## 2022-04-26 RX ADMIN — ACETAMINOPHEN 650 MG: 325 TABLET ORAL at 05:43

## 2022-04-26 RX ADMIN — HYDROMORPHONE HYDROCHLORIDE 0.25 MG: 1 INJECTION, SOLUTION INTRAMUSCULAR; INTRAVENOUS; SUBCUTANEOUS at 00:22

## 2022-04-26 RX ADMIN — SODIUM CHLORIDE, PRESERVATIVE FREE 10 ML: 5 INJECTION INTRAVENOUS at 19:50

## 2022-04-26 RX ADMIN — HYDROMORPHONE HYDROCHLORIDE 0.25 MG: 1 INJECTION, SOLUTION INTRAMUSCULAR; INTRAVENOUS; SUBCUTANEOUS at 05:43

## 2022-04-26 RX ADMIN — ONDANSETRON 4 MG: 2 INJECTION INTRAMUSCULAR; INTRAVENOUS at 06:25

## 2022-04-26 ASSESSMENT — PAIN DESCRIPTION - ORIENTATION
ORIENTATION: LOWER

## 2022-04-26 ASSESSMENT — PAIN DESCRIPTION - LOCATION
LOCATION: ABDOMEN

## 2022-04-26 ASSESSMENT — PAIN SCALES - GENERAL
PAINLEVEL_OUTOF10: 4
PAINLEVEL_OUTOF10: 5
PAINLEVEL_OUTOF10: 6
PAINLEVEL_OUTOF10: 0
PAINLEVEL_OUTOF10: 5

## 2022-04-26 ASSESSMENT — PAIN DESCRIPTION - PAIN TYPE
TYPE: ACUTE PAIN

## 2022-04-26 ASSESSMENT — PAIN DESCRIPTION - DESCRIPTORS
DESCRIPTORS: SORE

## 2022-04-26 ASSESSMENT — PAIN SCALES - WONG BAKER
WONGBAKER_NUMERICALRESPONSE: 0
WONGBAKER_NUMERICALRESPONSE: 0

## 2022-04-26 ASSESSMENT — PAIN DESCRIPTION - ONSET
ONSET: ON-GOING

## 2022-04-26 ASSESSMENT — PAIN DESCRIPTION - FREQUENCY
FREQUENCY: CONTINUOUS

## 2022-04-26 ASSESSMENT — PAIN - FUNCTIONAL ASSESSMENT
PAIN_FUNCTIONAL_ASSESSMENT: ACTIVITIES ARE NOT PREVENTED

## 2022-04-26 NOTE — PROGRESS NOTES
PT AAO x4 per this shift. VSS. Pt not tolerating sips with med or ice. Ongoing nausea per this shift. Pt having ongoing pain to ABD. Managed with PRN medication, rest, emotional support. Pt ostomy reinforced. Small loose brown output. No further needs voiced at this time. Fall precautions in place. Bed alarm on. Call light within reach. Will continue to round.  Electronically signed by Precious Ibarra RN on 4/26/2022 at 7:48 AM

## 2022-04-26 NOTE — PLAN OF CARE
Problem: Discharge Planning  Goal: Discharge to home or other facility with appropriate resources  4/26/2022 0770 by Kassie Durant RN  Outcome: Progressing  Flowsheets (Taken 4/26/2022 0004 by Benita Guzman RN)  Discharge to home or other facility with appropriate resources:   Identify barriers to discharge with patient and caregiver   Arrange for needed discharge resources and transportation as appropriate   Identify discharge learning needs (meds, wound care, etc)     Problem: Skin/Tissue Integrity  Goal: Absence of new skin breakdown  Description: 1. Monitor for areas of redness and/or skin breakdown  2. Assess vascular access sites hourly  3. Every 4-6 hours minimum:  Change oxygen saturation probe site  4. Every 4-6 hours:  If on nasal continuous positive airway pressure, respiratory therapy assess nares and determine need for appliance change or resting period.   4/26/2022 0722 by Kassie Durant RN  Outcome: Progressing  Note: Pt absent from new skin breakdown on this shift      Problem: Safety - Adult  Goal: Free from fall injury  4/26/2022 0722 by Kassie Durant RN  Outcome: Progressing  Flowsheets (Taken 4/26/2022 0004 by Benita Guzman RN)  Free From Fall Injury:   Instruct family/caregiver on patient safety   Based on caregiver fall risk screen, instruct family/caregiver to ask for assistance with transferring infant if caregiver noted to have fall risk factors     Problem: ABCDS Injury Assessment  Goal: Absence of physical injury  4/26/2022 5207 by Kassie Durant RN  Outcome: Progressing  Note: Pt absent from physical injury on this shift      Problem: Pain  Goal: Verbalizes/displays adequate comfort level or baseline comfort level  4/26/2022 0722 by Kassie Durant RN  Outcome: Progressing  Flowsheets (Taken 4/26/2022 0004 by Benita Guzman RN)  Verbalizes/displays adequate comfort level or baseline comfort level:   Assess pain using appropriate pain scale   Encourage patient to monitor pain and request assistance   Administer analgesics based on type and severity of pain and evaluate response   Implement non-pharmacological measures as appropriate and evaluate response

## 2022-04-26 NOTE — PLAN OF CARE
Problem: Discharge Planning  Goal: Discharge to home or other facility with appropriate resources  4/26/2022 0004 by Anshul Montague RN  Outcome: Progressing  Flowsheets (Taken 4/26/2022 0004)  Discharge to home or other facility with appropriate resources:   Identify barriers to discharge with patient and caregiver   Arrange for needed discharge resources and transportation as appropriate   Identify discharge learning needs (meds, wound care, etc)  4/25/2022 1056 by Dejan Thompson RN  Outcome: Progressing  Flowsheets (Taken 4/25/2022 0453 by Ze Rose RN)  Discharge to home or other facility with appropriate resources: Identify barriers to discharge with patient and caregiver     Problem: Skin/Tissue Integrity  Goal: Absence of new skin breakdown  Description: 1. Monitor for areas of redness and/or skin breakdown  2. Assess vascular access sites hourly  3. Every 4-6 hours minimum:  Change oxygen saturation probe site  4. Every 4-6 hours:  If on nasal continuous positive airway pressure, respiratory therapy assess nares and determine need for appliance change or resting period. 4/26/2022 0004 by Anshul Montague RN  Outcome: Progressing  Note: Derrick score assessed. Patient able to ambulate and turn self. Repositioned patient Q2H and assessed skin. Educated patient on importance of repositioning to prevent skin issues. 4/25/2022 1056 by Dejan Thomspon RN  Outcome: Progressing  Note: Pt absent from new skin breakdown      Problem: Safety - Adult  Goal: Free from fall injury  4/26/2022 0004 by Anshul Montague RN  Outcome: Progressing  Flowsheets (Taken 4/26/2022 0004)  Free From Fall Injury:   Instruct family/caregiver on patient safety   Based on caregiver fall risk screen, instruct family/caregiver to ask for assistance with transferring infant if caregiver noted to have fall risk factors  Note: Patient educated on fall prevention.   Call light is within reach, bed locked in lowest position, personal items within reach, and bed alarm is on. Will round on patient per unit guidelines. 4/25/2022 1056 by Richar Moore RN  Outcome: Progressing  Flowsheets (Taken 4/25/2022 1055)  Free From Fall Injury: Nataliia Escalera family/caregiver on patient safety     Problem: ABCDS Injury Assessment  Goal: Absence of physical injury  4/26/2022 0004 by Amber Escobar RN  Outcome: Progressing  Note: Pt is free of injury. No injury noted. Fall precautions in place. Call light within reach. Will monitor.      4/25/2022 1056 by Richar Moore RN  Outcome: Progressing  Note: Pt absent from physical injury

## 2022-04-26 NOTE — PROGRESS NOTES
General and Vascular Surgery                                                           Daily Progress Note                                                             Sung Goddard PA-C     Pt Name: Jaimee Kaur  Medical Record Number: [id number]  Date of Birth 1980   Today's Date: 4/26/2022    ASSESSMENT/PLAN  1. Metastatic colon cancer  2. Feeling a little better today. 3. +nausea and emesis last night and this AM. Medications being adjusted. 4. S/P lap transverse colectomy with end colostomy on 4/13/22  5. +left sided abdominal pain that radiates to her left back slightly impoved  6. Stoma ok. +stool and gas production  7. Leukocytosis: WBC count: 12.9-->14.1-->13.3  8. CT scan 4/25/22 showed no acute findings  9. Monitor and control pain  10. OOB and ambulate as tolerated      EDUCATION  Patient educated about their illness/diagnosis, stated above, and all questions answered. We discussed the importance of nutrition, medications they are taking, and healthy lifestyle. Niranjan Girard has slightly improved from yesterday. Pain is well controlled. She has nausea and vomiting. She has passed flatus and has had a bowel movement. OBJECTIVE  VITALS:  height is 5' 6\" (1.676 m) and weight is 214 lb 11.7 oz (97.4 kg). Her oral temperature is 98.7 °F (37.1 °C). Her blood pressure is 130/78 and her pulse is 75. Her respiration is 18 and oxygen saturation is 98%. VITALS:  /78   Pulse 75   Temp 98.7 °F (37.1 °C) (Oral)   Resp 18   Ht 5' 6\" (1.676 m)   Wt 214 lb 11.7 oz (97.4 kg)   LMP 04/10/2022   SpO2 98%   BMI 34.66 kg/m²   GENERAL: alert, no distress  LUNGS: clear to ausculation, without wheezes, rales or rhonci  HEART: normal rate and regular rhythm  ABDOMEN: soft, Left sided tenderness: no guarding or rebound tenderness, non-distended   EXTREMITY: no cyanosis, clubbing or edema  I/O last 3 completed shifts:   In: 249 [I.V.:249]  Out: 3200 [Urine:2950; Emesis/NG output:200; Stool:50]  No intake/output data recorded. LABS  Recent Labs     04/25/22  0410 04/25/22  0602 04/26/22  0604   WBC  --    < > 13.3*   HGB  --    < > 9.3*   HCT  --    < > 27.7*   PLT  --    < > 293   NA  --    < > 134*   K  --    < > 3.7   CL  --    < > 100   CO2  --    < > 19*   BUN  --    < > 7   CREATININE  --    < > 0.7   MG  --    < > 2.30   CALCIUM  --    < > 8.5   AST  --    < > 43*   ALT  --    < > 23   BILITOT  --    < > 0.7   NITRU Negative  --   --    COLORU Yellow  --   --     < > = values in this interval not displayed. CBC:   Lab Results   Component Value Date    WBC 13.3 04/26/2022    RBC 2.95 04/26/2022    HGB 9.3 04/26/2022    HCT 27.7 04/26/2022    MCV 94.1 04/26/2022    MCH 31.5 04/26/2022    MCHC 33.5 04/26/2022    RDW 13.9 04/26/2022     04/26/2022    MPV 8.1 04/26/2022     CMP:    Lab Results   Component Value Date     04/26/2022    K 3.7 04/26/2022    K 4.0 04/25/2022     04/26/2022    CO2 19 04/26/2022    BUN 7 04/26/2022    CREATININE 0.7 04/26/2022    GFRAA >60 04/26/2022    GFRAA >60 06/06/2013    AGRATIO 0.8 04/26/2022    LABGLOM >60 04/26/2022    GLUCOSE 88 04/26/2022    PROT 6.7 04/26/2022    LABALBU 2.9 04/26/2022    CALCIUM 8.5 04/26/2022    BILITOT 0.7 04/26/2022    ALKPHOS 97 04/26/2022    AST 43 04/26/2022    ALT 23 04/26/2022         Yuri Nieto PA-C  Electronically signed 4/26/2022 at 10:45 AM     Agree with above note. The patient was personally seen and examined. Hannah Salinas is doing about the same. Still with nausea and emesis. LUQ pain improved slightly.     Left chest and neck incisions c/d/i, no erythema or induration, non-tender  Abd soft, ND, minimal LUQ tenderness, no peritonitis, stoma functioning    WBC 13.3  Cr 0.7    A/P: 35 yo female with metastatic colon CA s/p lap transverse colectomy with colostomy on 4/13, now with nausea, vomiting, LUQ pain    Reviewed CT from yesterday with radiology. No obvious source of inflammation or infection. Continue cefepime for now. Monitor leukocytosis. Modify regimen to keep nausea under control.     Appreciate ostomy nurse assistance    Ambulate/OOB    Stewart Lema MD

## 2022-04-26 NOTE — PROGRESS NOTES
Reminded to get LAMOTRIGINE level soon. Blood sample for seizure medication (s) before the 1st dose in the morning (TROUGH LEVEL)             Reviewed [place] state regulations on seizures and driving with   her including:   A. NOT to operate a motor vehicle within 3 months following any seizure or  other episode with sudden unconsciousness or inability to sit up.     B. Requirement by the patient to report to Department of Motor Vehicle   (DMV) within 30  days after the event      C. To avoid any activities that might lead to self -injury or injury to others.   Such activities include operating power cutting tools, handling firearms,   climbing heights, exposure to vessels with hot cooking oil or water, and swimming alone.    Remember the importance of compliance with anticonvulsant medication    Reminded to document the frequency of the seizures    Advised to go to local hospital ER for acute emergencies/seizure   management  and arrange office follow-up for further review of medications    To call us for follow-up appointment after the work-up.                       Patient is alert & oriented x4, CGA, 2/4 bed rails up, bed in lowest position, fall precautions in place, call light within reach. Morning medications given. Morning assessment complete. Dr. Roxie Ordoñez called about patient's ongoing nausea. Order placed for phenergan. Pt made aware. Ostomy in place. Will cont to monitor and reassess.   Electronically signed by Inez Pryor RN on 4/26/2022 at 9:07 AM\

## 2022-04-26 NOTE — ADT AUTH CERT
Utilization Reviews         Sepsis and Other Febrile Illness, without Focal Infection - Care Day 2 (4/25/2022) by Herman Jefferson RN       Review Status Review Entered   Completed 4/26/2022 10:26      Criteria Review      Care Day: 2 Care Date: 4/25/2022 Level of Care: Telemetry    Guideline Day 2    Level Of Care    (X) ICU or floor    4/26/2022 10:26 AM EDT by Adriana Wasserman      med surg    Clinical Status    ( ) * Hemodynamic stability    (X) * Hypoxemia absent    4/26/2022 10:26 AM EDT by Adriana Wasserman      on RA    (X) * Tachypnea absent    4/26/2022 10:26 AM EDT by Adriana Wasserman      resp below 18    * Milestone   Additional Notes   DATE: 4/25      Pertinent Updates:   WBC up to 14.1 from 12.9   Cr 0.6   UA negative for WBCs, leuk esterase, or nitrites      Vitals:    height is 5' 6\" (1.676 m) and weight is 221 lb 5.5 oz (100.4 kg). Her oral temperature is 98.6 °F (37 °C). Her blood pressure is 110/75 and her pulse is 70. Her respiration is 17 and oxygen saturation is 99%.          CONSTITUTIONAL: Alert and oriented times 3, no acute distress and cooperative to examination with proper mood and affect. SKIN: Skin color, texture, turgor normal. No rashes or lesions. LYMPH: no cervical nodes, no inguinal nodes   HEENT: Head is normocephalic, atraumatic. EOMI, PERRLA. NECK: Supple, symmetrical, trachea midline, no adenopathy, thyroid symmetric, not enlarged and no tenderness, skin normal.   CHEST/LUNGS: chest symmetric with normal A/P diameter, normal respiratory rate and rhythm   CARDIOVASCULAR: Heart sounds are normal.  Regular rate and rhythm    ABDOMEN: Normal shape. Tenderness: Left sided   RECTAL: deferred, not clinically indicated   NEUROLOGIC: There are no focalizing motor or sensory deficits. CN II-XII are grossly intact. Shaaron Money EXTREMITIES: no cyanosis, no clubbing and no edema.       Abnl/Pertinent Labs/Radiology/Diagnostic Studies:   Sodium 134 mmol/L      Potassium reflex Magnesium 4.0 mmol/L   Chloride 102 mmol/L      CO2 19 mmol/L      Anion Gap 13     Glucose 103 mg/dL      BUN 7 mg/dL      CREATININE 0.6 mg/dL      GFR Non- >60     GFR African American >60     Calcium 8.2 mg/dL      Total Protein 6.0 g/dL      Albumin 3.2 g/dL      Albumin/Globulin Ratio 1.1     Total Bilirubin 0.6 mg/dL      Alkaline Phosphatase 104 U/L      ALT 30 U/L      AST 86 U/L       Hyaline Casts, UA 1 /LPF      WBC, UA 1 /HPF      RBC, UA 2 /HPF      Epithelial Cells, UA 6 /HPF    Color, UA Yellow      Clarity, UA Clear     Glucose, Ur Negative mg/dL      Bilirubin Urine Negative     Ketones, Urine Negative mg/dL      Specific Gravity, UA 1.015     Blood, Urine TRACE-INTACT     pH, UA 7.0     Protein, UA Negative mg/dL      Urobilinogen, Urine 0.2 E.U./dL      Nitrite, Urine Negative     Leukocyte Esterase, Urine Negative     Microscopic Examination YES      Sodium 134 mmol/L      Potassium 3.6 mmol/L      Chloride 102 mmol/L      CO2 20 mmol/L      Anion Gap 12     Glucose 101 mg/dL      BUN 8 mg/dL      CREATININE 0.6 mg/dL      GFR Non- >60     GFR African American >60     Calcium 8.4 mg/dL      Total Protein 6.3 g/dL      Albumin 2.8 g/dL      Albumin/Globulin Ratio 0.8     Total Bilirubin 0.6 mg/dL      Alkaline Phosphatase 99 U/L      ALT 28 U/L      AST 73 U/L        Magnesium 1.50 mg/dL       WBC 14.1 K/uL      RBC 2.97 M/uL      Hemoglobin 9.4 g/dL      Hematocrit 28.1 %      MCV 94.7 fL      MCH 31.6 pg             MD Consults/Assessments & Plans:   General surgery   IMPRESSIONS:   1. Metastatic colon cancer   2. S/P lap transverse colectomy with end colostomy on 4/13/22   3. +left sided abdominal pain that radiates to her left back   4. Stoma ok. +stool and gas production   5. Leukocytosis: WBC count: 12.9-->14.1   6. CT scan this AM showed no acute findings   PLANS:   1. Monitor and control pain   2. Ostomy nurse eval/care   3. Monitor WBC count   4. Antibiotics   5.  OOB and ambulate as tolerated            Medications:   · pantoprazole 40 mg Oral QAM AC   · sodium chloride flush 5-40 mL IntraVENous 2 times per day   · enoxaparin 40 mg SubCUTAneous Daily   · metroNIDAZOLE 500 mg IntraVENous Q8H   · cefepime 2,000 mg IntraVENous Q12H                  PA recommendation by Dionne Platt RN       Review Status Review Entered   In Primary 2022 16:29      Criteria Review   I agree with INPATIENT admission status. Name: Yin Javed   : 1980   MRN: [id number]  Insurance: [facility]     Date of admission: 22    Clinical summary Sepsis unclear etiology, stage IV metastatic colon cancer, s/p recent resection  Vitals Temp 101.5  Labs and Imaging leukocytosis    Inpatient status appropriate for this high-risk patient with concern for infection requiring broad spectrum antibiotics, further evaluation, close monitoring, and ongoing hospital-based care. [facility] Inpatient care criteria apply    The hospitalization status decision depends on the attending physician's judgment.     Patient's chart was reviewed at 2:59 PM 2022    Philipp Downey MD  Physician [place] [id number] [facility]   Cell: [phone]

## 2022-04-26 NOTE — PROGRESS NOTES
Patient medicated for temp 100.4 with tylenol and medicated for nausea with no vomiting with compazine. Will continue to monitor.

## 2022-04-26 NOTE — PROGRESS NOTES
Hospitalist Progress Note  4/26/2022 10:54 AM    PCP: No primary care provider on file. [phone]     Date of Admission: 4/24/2022                                                                                                                     HOSPITAL COURSE    Patient demographics:  The patient  Brandy Brunner is a 36 y.o. female     Significant past medical history:   Patient Active Problem List   Diagnosis    Lower GI bleed    Trichimoniasis    Liver lesion    Colonic mass    Rectal bleeding    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 ([address]    Abdominal pain    [facility]    Sepsis ([address]         Presenting symptoms:  Abdominal pain, nausea, vomiting, fever and chills       Diagnostic workup:      CONSULTS DURING ADMISSION :   IP CONSULT TO GENERAL SURGERY  IP CONSULT TO HOSPITALIST  IP CONSULT TO GENERAL SURGERY      Patient was diagnosed with:  Sepsis  Abdominal pain  Malignancy      Treatment while inpatient:  36years old female with medical history significant for recent diagnosis of stage IV colon cancer, patient is status post colectomy and end colostomy created on 4/13. Patient was discharged from the hospital more recently on the 19th. Patient again presented to the emergency room with 3 to 4 days of                   intractable nausea vomiting and abdominal pain.   Patient was also noted to have leukocytosis and fever and patient was diagnosed with sepsis without unclear etiology for her sepsis                                                                  ----------------------------------------------------------      SUBJECTIVE COMPLAINTS- follow up for Abdominal pain,     Diet: Diet NPO Exceptions are: Ice Chips, Sips of Water with Meds      OBJECTIVE:   Patient Active Problem List   Diagnosis    Lower GI bleed    Trichimoniasis    Liver lesion    Colonic mass    Rectal bleeding    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 ([facility])    Abdominal pain    [facility] ([facility] [address]    Sepsis ([facility]       Allergies  Codeine and Pcn [penicillins]    Medications    Scheduled Meds:   pantoprazole  40 mg Oral QAM AC    sodium chloride flush  5-40 mL IntraVENous 2 times per day    enoxaparin  40 mg SubCUTAneous Daily    metroNIDAZOLE  500 mg IntraVENous Q8H    cefepime  2,000 mg IntraVENous Q12H     Continuous Infusions:   sodium chloride 100 mL/hr at 04/25/22 1422     PRN Meds:  prochlorperazine, dicyclomine, sodium chloride flush, sodium chloride, acetaminophen **OR** acetaminophen, ondansetron, HYDROmorphone **OR** HYDROmorphone, iohexol    Vitals   Vitals /wt   Patient Vitals for the past 8 hrs:   BP Temp Temp src Pulse Resp SpO2 Weight   04/26/22 0752 130/78 98.7 °F (37.1 °C) Oral 75 18 98 % --   04/26/22 0738 (!) 126/90 98.8 °F (37.1 °C) Oral 77 16 98 % --   04/26/22 0723 -- -- -- -- -- -- 214 lb 11.7 oz (97.4 kg)   04/26/22 0418 124/84 100.7 °F (38.2 °C) Oral 91 18 96 % --        72HR INTAKE/OUTPUT:      Intake/Output Summary (Last 24 hours) at 4/26/2022 1054  Last data filed at 4/26/2022 0901  Gross per 24 hour   Intake 0 ml   Output 2450 ml   Net -2450 ml       Exam:    Gen:   Alert and oriented ×3   Eyes: PERRL. No sclera icterus. No conjunctival injection. ENT: No discharge. Pharynx clear. External appearance of ears and nose normal.  Neck: Trachea midline. No obvious mass. Resp: No accessory muscle use. No crackles. No wheezes. No rhonchi. CV: Regular rate. Regular rhythm. No murmur or rub. No edema. GI: Non-tender. Non-distended. No hernia. Skin: Warm, dry, normal texture and turgor. Lymph: No cervical LAD. No supraclavicular LAD. M/S: / Ext. No cyanosis. No clubbing. No joint deformity. Neuro: CN 2-12 are intact,  no neurologic deficits noted. PT/INR: No results for input(s): PROTIME, INR in the last 72 hours. APTT: No results for input(s): APTT in the last 72 hours.     CBC:   Recent Labs     04/24/22  1959 04/25/22  0602 04/26/22  0604   WBC 12.9* 14.1* 13.3*   HGB 11.5* 9.4* 9.3*   HCT 35.0* 28.1* 27.7*   MCV 93.9 94.7 94.1    291 293       BMP:   Recent Labs     04/24/22 1959 04/25/22 0336 04/25/22  0602 04/26/22  0604   * 134* 134* 134*   K 4.1 4.0 3.6 3.7   CL 97* 102 102 100   CO2 20* 19* 20* 19*   BUN 8 7 8 7   CREATININE 0.7 0.6 0.6 0.7       LIVER PROFILE:   Recent Labs     04/24/22 1959 04/25/22  0336 04/25/22  0602 04/26/22  0604   ALKPHOS 132* 104 99 97   * 86* 73* 43*   ALT 37 30 28 23   BILITOT 0.5 0.6 0.6 0.7     No results for input(s): AMYLASE in the last 72 hours. Recent Labs     04/24/22 1959   LIPASE 17.0       UA:  Recent Labs     04/25/22  0410   WBCUA 1   RBCUA 2       TROPONIN: No results for input(s): Skyler [place] in the last 72 hours. Lab Results   Component Value Date/Time    URRFLXCULT Not Indicated 04/25/2022 04:10 AM       No results for input(s): TSHREFLEX in the last 72 hours. No components found for: [id number]  POC GLUCOSE:  No results for input(s): POCGLU in the last 72 hours. No results for input(s): LABA1C in the last 72 hours. No results found for: LABA1C      ASSESSMENT AND PLAN    Sepsis  Unclear etiology  Continues to have nausea vomiting fever chills and leukocytosis  Continue with antibiotics, with cefepime and Flagyl  Consult to infectious disease    Abdominal pain  Continue to be n.p.o.  IV fluids  General surgery is following    Pain management  R52  Continue with the IV and oral pain medication      Metastatic colon cancer  Status post colectomy and end colostomy  CT scan of the abdomen showed no acute abnormality    Code Status: Full Code        Dispo - cc        The patient and / or the family were informed of the results of any tests, a time was given to answer questions, a plan was proposed and they agreed with plan. Di Hernandez MD    This note was transcribed using [id number]. Please disregard any translational errors.

## 2022-04-27 ENCOUNTER — APPOINTMENT (OUTPATIENT)
Dept: CT IMAGING | Age: 42
DRG: 720 | End: 2022-04-27
Payer: COMMERCIAL

## 2022-04-27 ENCOUNTER — APPOINTMENT (OUTPATIENT)
Dept: GENERAL RADIOLOGY | Age: 42
DRG: 720 | End: 2022-04-27
Payer: COMMERCIAL

## 2022-04-27 LAB
A/G RATIO: 0.9 (ref 1.1–2.2)
ALBUMIN SERPL-MCNC: 2.8 G/DL (ref 3.4–5)
ALP BLD-CCNC: 103 U/L (ref 40–129)
ALT SERPL-CCNC: 17 U/L (ref 10–40)
ANION GAP SERPL CALCULATED.3IONS-SCNC: 14 MMOL/L (ref 3–16)
AST SERPL-CCNC: 26 U/L (ref 15–37)
BASOPHILS ABSOLUTE: 0 K/UL (ref 0–0.2)
BASOPHILS RELATIVE PERCENT: 0.4 %
BILIRUB SERPL-MCNC: 0.6 MG/DL (ref 0–1)
BUN BLDV-MCNC: 10 MG/DL (ref 7–20)
CALCIUM SERPL-MCNC: 8.8 MG/DL (ref 8.3–10.6)
CHLORIDE BLD-SCNC: 102 MMOL/L (ref 99–110)
CO2: 19 MMOL/L (ref 21–32)
CREAT SERPL-MCNC: 0.7 MG/DL (ref 0.6–1.1)
EOSINOPHILS ABSOLUTE: 0.1 K/UL (ref 0–0.6)
EOSINOPHILS RELATIVE PERCENT: 1.5 %
GFR AFRICAN AMERICAN: >60
GFR NON-AFRICAN AMERICAN: >60
GLUCOSE BLD-MCNC: 86 MG/DL (ref 70–99)
HCT VFR BLD CALC: 27.9 % (ref 36–48)
HEMOGLOBIN: 9.3 G/DL (ref 12–16)
LYMPHOCYTES ABSOLUTE: 1 K/UL (ref 1–5.1)
LYMPHOCYTES RELATIVE PERCENT: 10.3 %
MAGNESIUM: 2 MG/DL (ref 1.8–2.4)
MCH RBC QN AUTO: 31.7 PG (ref 26–34)
MCHC RBC AUTO-ENTMCNC: 33.5 G/DL (ref 31–36)
MCV RBC AUTO: 94.7 FL (ref 80–100)
MONOCYTES ABSOLUTE: 1.4 K/UL (ref 0–1.3)
MONOCYTES RELATIVE PERCENT: 14.7 %
NEUTROPHILS ABSOLUTE: 7.1 K/UL (ref 1.7–7.7)
NEUTROPHILS RELATIVE PERCENT: 73.1 %
PDW BLD-RTO: 13.8 % (ref 12.4–15.4)
PLATELET # BLD: 317 K/UL (ref 135–450)
PMV BLD AUTO: 8 FL (ref 5–10.5)
POTASSIUM SERPL-SCNC: 3.7 MMOL/L (ref 3.5–5.1)
RBC # BLD: 2.94 M/UL (ref 4–5.2)
SODIUM BLD-SCNC: 135 MMOL/L (ref 136–145)
TOTAL PROTEIN: 5.9 G/DL (ref 6.4–8.2)
WBC # BLD: 9.7 K/UL (ref 4–11)

## 2022-04-27 PROCEDURE — 83735 ASSAY OF MAGNESIUM: CPT

## 2022-04-27 PROCEDURE — 6370000000 HC RX 637 (ALT 250 FOR IP): Performed by: STUDENT IN AN ORGANIZED HEALTH CARE EDUCATION/TRAINING PROGRAM

## 2022-04-27 PROCEDURE — 85025 COMPLETE CBC W/AUTO DIFF WBC: CPT

## 2022-04-27 PROCEDURE — 6360000002 HC RX W HCPCS: Performed by: STUDENT IN AN ORGANIZED HEALTH CARE EDUCATION/TRAINING PROGRAM

## 2022-04-27 PROCEDURE — APPSS45 APP SPLIT SHARED TIME 31-45 MINUTES: Performed by: NURSE PRACTITIONER

## 2022-04-27 PROCEDURE — 94760 N-INVAS EAR/PLS OXIMETRY 1: CPT

## 2022-04-27 PROCEDURE — 36415 COLL VENOUS BLD VENIPUNCTURE: CPT

## 2022-04-27 PROCEDURE — 99024 POSTOP FOLLOW-UP VISIT: CPT | Performed by: NURSE PRACTITIONER

## 2022-04-27 PROCEDURE — APPNB45 APP NON BILLABLE 31-45 MINUTES: Performed by: NURSE PRACTITIONER

## 2022-04-27 PROCEDURE — 2500000003 HC RX 250 WO HCPCS: Performed by: STUDENT IN AN ORGANIZED HEALTH CARE EDUCATION/TRAINING PROGRAM

## 2022-04-27 PROCEDURE — APPNB45 APP NON BILLABLE 31-45 MINUTES: Performed by: PHYSICIAN ASSISTANT

## 2022-04-27 PROCEDURE — 99255 IP/OBS CONSLTJ NEW/EST HI 80: CPT | Performed by: INTERNAL MEDICINE

## 2022-04-27 PROCEDURE — 6370000000 HC RX 637 (ALT 250 FOR IP): Performed by: NURSE PRACTITIONER

## 2022-04-27 PROCEDURE — 99024 POSTOP FOLLOW-UP VISIT: CPT | Performed by: SURGERY

## 2022-04-27 PROCEDURE — 71250 CT THORAX DX C-: CPT

## 2022-04-27 PROCEDURE — 71045 X-RAY EXAM CHEST 1 VIEW: CPT

## 2022-04-27 PROCEDURE — 2580000003 HC RX 258: Performed by: STUDENT IN AN ORGANIZED HEALTH CARE EDUCATION/TRAINING PROGRAM

## 2022-04-27 PROCEDURE — 6360000002 HC RX W HCPCS: Performed by: HOSPITALIST

## 2022-04-27 PROCEDURE — 1200000000 HC SEMI PRIVATE

## 2022-04-27 PROCEDURE — 80053 COMPREHEN METABOLIC PANEL: CPT

## 2022-04-27 RX ORDER — IBUPROFEN 600 MG/1
600 TABLET ORAL EVERY 6 HOURS PRN
Status: DISCONTINUED | OUTPATIENT
Start: 2022-04-27 | End: 2022-04-29 | Stop reason: HOSPADM

## 2022-04-27 RX ADMIN — PANTOPRAZOLE SODIUM 40 MG: 40 TABLET, DELAYED RELEASE ORAL at 05:51

## 2022-04-27 RX ADMIN — SODIUM CHLORIDE, PRESERVATIVE FREE 10 ML: 5 INJECTION INTRAVENOUS at 21:10

## 2022-04-27 RX ADMIN — PROCHLORPERAZINE EDISYLATE 5 MG: 5 INJECTION INTRAMUSCULAR; INTRAVENOUS at 08:42

## 2022-04-27 RX ADMIN — SODIUM CHLORIDE, PRESERVATIVE FREE 10 ML: 5 INJECTION INTRAVENOUS at 08:16

## 2022-04-27 RX ADMIN — IBUPROFEN 600 MG: 600 TABLET ORAL at 21:09

## 2022-04-27 RX ADMIN — METRONIDAZOLE 500 MG: 500 INJECTION, SOLUTION INTRAVENOUS at 08:42

## 2022-04-27 RX ADMIN — ONDANSETRON 4 MG: 2 INJECTION INTRAMUSCULAR; INTRAVENOUS at 14:06

## 2022-04-27 RX ADMIN — HYDROMORPHONE HYDROCHLORIDE 0.5 MG: 1 INJECTION, SOLUTION INTRAMUSCULAR; INTRAVENOUS; SUBCUTANEOUS at 14:06

## 2022-04-27 RX ADMIN — METRONIDAZOLE 500 MG: 500 INJECTION, SOLUTION INTRAVENOUS at 17:44

## 2022-04-27 RX ADMIN — HYDROMORPHONE HYDROCHLORIDE 0.5 MG: 1 INJECTION, SOLUTION INTRAMUSCULAR; INTRAVENOUS; SUBCUTANEOUS at 01:02

## 2022-04-27 RX ADMIN — ENOXAPARIN SODIUM 40 MG: 100 INJECTION SUBCUTANEOUS at 08:42

## 2022-04-27 RX ADMIN — DICYCLOMINE HYDROCHLORIDE 20 MG: 10 CAPSULE ORAL at 01:01

## 2022-04-27 RX ADMIN — ACETAMINOPHEN 650 MG: 325 TABLET ORAL at 21:08

## 2022-04-27 RX ADMIN — CEFEPIME HYDROCHLORIDE 2000 MG: 2 INJECTION, POWDER, FOR SOLUTION INTRAVENOUS at 14:06

## 2022-04-27 RX ADMIN — HYDROMORPHONE HYDROCHLORIDE 0.5 MG: 1 INJECTION, SOLUTION INTRAMUSCULAR; INTRAVENOUS; SUBCUTANEOUS at 08:42

## 2022-04-27 RX ADMIN — CEFEPIME HYDROCHLORIDE 2000 MG: 2 INJECTION, POWDER, FOR SOLUTION INTRAVENOUS at 02:18

## 2022-04-27 RX ADMIN — METRONIDAZOLE 500 MG: 500 INJECTION, SOLUTION INTRAVENOUS at 00:58

## 2022-04-27 ASSESSMENT — PAIN DESCRIPTION - PAIN TYPE
TYPE: ACUTE PAIN

## 2022-04-27 ASSESSMENT — PAIN - FUNCTIONAL ASSESSMENT
PAIN_FUNCTIONAL_ASSESSMENT: PREVENTS OR INTERFERES SOME ACTIVE ACTIVITIES AND ADLS

## 2022-04-27 ASSESSMENT — PAIN SCALES - WONG BAKER
WONGBAKER_NUMERICALRESPONSE: 0
WONGBAKER_NUMERICALRESPONSE: 8
WONGBAKER_NUMERICALRESPONSE: 8

## 2022-04-27 ASSESSMENT — PAIN DESCRIPTION - FREQUENCY
FREQUENCY: CONTINUOUS

## 2022-04-27 ASSESSMENT — PAIN DESCRIPTION - ORIENTATION
ORIENTATION: LEFT

## 2022-04-27 ASSESSMENT — PAIN DESCRIPTION - LOCATION
LOCATION: ABDOMEN

## 2022-04-27 ASSESSMENT — PAIN SCALES - GENERAL
PAINLEVEL_OUTOF10: 4
PAINLEVEL_OUTOF10: 6
PAINLEVEL_OUTOF10: 0
PAINLEVEL_OUTOF10: 4
PAINLEVEL_OUTOF10: 0
PAINLEVEL_OUTOF10: 8
PAINLEVEL_OUTOF10: 4
PAINLEVEL_OUTOF10: 5
PAINLEVEL_OUTOF10: 8
PAINLEVEL_OUTOF10: 0
PAINLEVEL_OUTOF10: 4
PAINLEVEL_OUTOF10: 6
PAINLEVEL_OUTOF10: 8

## 2022-04-27 ASSESSMENT — PAIN DESCRIPTION - DESCRIPTORS
DESCRIPTORS: ACHING
DESCRIPTORS: ACHING
DESCRIPTORS: ACHING;CRAMPING;DISCOMFORT
DESCRIPTORS: ACHING

## 2022-04-27 ASSESSMENT — PAIN DESCRIPTION - ONSET
ONSET: ON-GOING
ONSET: GRADUAL
ONSET: ON-GOING

## 2022-04-27 NOTE — PLAN OF CARE
Problem: Discharge Planning  Goal: Discharge to home or other facility with appropriate resources  Outcome: Progressing  Flowsheets (Taken 4/27/2022 0532)  Discharge to home or other facility with appropriate resources:   Identify discharge learning needs (meds, wound care, etc)   Arrange for needed discharge resources and transportation as appropriate   Identify barriers to discharge with patient and caregiver     Problem: Skin/Tissue Integrity  Goal: Absence of new skin breakdown  Description: 1. Monitor for areas of redness and/or skin breakdown  2. Assess vascular access sites hourly  3. Every 4-6 hours minimum:  Change oxygen saturation probe site  4. Every 4-6 hours:  If on nasal continuous positive airway pressure, respiratory therapy assess nares and determine need for appliance change or resting period. Outcome: Progressing     Problem: ABCDS Injury Assessment  Goal: Absence of physical injury  Outcome: Progressing  Note: Patient remains free from physical injury. Patient educated on safety precautions. Will continue to monitor to ensure patient remains free from physical injury throughout remainder of shift.        Problem: Pain  Goal: Verbalizes/displays adequate comfort level or baseline comfort level  Outcome: Progressing  Flowsheets (Taken 4/27/2022 0532)  Verbalizes/displays adequate comfort level or baseline comfort level:   Encourage patient to monitor pain and request assistance   Assess pain using appropriate pain scale   Administer analgesics based on type and severity of pain and evaluate response   Implement non-pharmacological measures as appropriate and evaluate response     Problem: Safety - Adult  Goal: Free from fall injury  Flowsheets (Taken 4/27/2022 0532)  Free From Fall Injury: Instruct family/caregiver on patient safety

## 2022-04-27 NOTE — CARE COORDINATION
Attempted f/u ostomy teaching. Pt sitting up in chair. Pt pouch was changed recently. Poor seal and had to be reinforced with barrier strips. Current pouch clean dry and intact. Pt states she feels comfortable with changing her pouch. Requested to do ostomy teaching tomorrow. Pt is complaining of severe flank pain to R side that gets worse when she inhales. Attempted to sit up in chair for relief but is still present. Unclear of cause. Will see patient tomorrow for ostomy teaching.  Will plan to refer patient to outpatient ostomy NP at [facility].   Electronically signed by LES[employer] on 4/27/2022 at 12:02 PM

## 2022-04-27 NOTE — PROGRESS NOTES
[facility] General Surgery                                   Daily Progress Note                                                         Pt Name: Davie Ibarra  Medical Record Number: [id number]  Date of Birth 1980   Today's Date: 4/27/2022  Chief Complaint   Patient presents with    Abdominal Pain     post surgical complication         ASSESSMENT/PLAN  35 yo female with metastatic colon CA s/p lap transverse colectomy with colostomy on 4/13, now with nausea, vomiting, LUQ pain  Slight improvement in nausea, though still requiring antiemetics. Wants to try full liquids so diet advanced, told her to take it slow. CT no obvious source of inflammation or infection. CXR without fracture or fluid. Continue antibiotics, ID consult noted per primary team      Appreciate ostomy nurse assistance     Ambulate/OOB            SUBJECTIVE  Orly Pitts is sitting up to the chair. Significant right lower lateral chest/rib pain, cannot get comfortable. Ostomy working. OBJECTIVE  VITALS:  height is 5' 6\" (1.676 m) and weight is 213 lb 3 oz (96.7 kg). Her oral temperature is 98.4 °F (36.9 °C). Her blood pressure is 131/68 and her pulse is 75. Her respiration is 16 and oxygen saturation is 96%. INTAKE/OUTPUT:      Intake/Output Summary (Last 24 hours) at 4/27/2022 1159  Last data filed at 4/27/2022 0059  Gross per 24 hour   Intake 180 ml   Output 1150 ml   Net -970 ml     GENERAL: alert, cooperative, no distress  LUNGS: clear to ausculation, without wheezes, rales or rhonci  HEART: normal rate and regular rhythm  ABDOMEN: Soft, non tender, non distended. Ostomy stool and gas in appliance. EXTREMITY: no cyanosis, clubbing or edema  Tenderness to palpation right lower chest/flank, some improvement with hard direct pressure. I/O last 3 completed shifts:   In: 180 [P.O.:180]  Out: 8851 [Urine:2650; Stool:25]      LABS  Recent Labs     04/25/22  0410 04/25/22  0602 04/27/22  0604   WBC  --    < > 9.7   HGB  --    < > 9.3*   HCT  --    < > 27.9*   PLT  --    < > 317   NA  --    < > 135*   K  --    < > 3.7   CL  --    < > 102   CO2  --    < > 19*   BUN  --    < > 10   CREATININE  --    < > 0.7   MG  --    < > 2.00   CALCIUM  --    < > 8.8   AST  --    < > 26   ALT  --    < > 17   BILITOT  --    < > 0.6   NITRU Negative  --   --    COLORU Yellow  --   --     < > = values in this interval not displayed. EDUCATION  Patient educated about Disease Process, Medications, Smoking Cessation, Oxygenation, Incentive Spirometry and Deep Breath and Cough, Diabetes, Hyperlipidemia, Smoking Cessation, Nutrition, Exercise and Hypertension    Electronically signed by ANIKA Ngo CNP on 4/27/2022 at [facility] [address] and Vascular Surgery   [phone] Office  [phone] Fax    Agree with above note. The patient was personally seen and examined. Jaimee Kaur is doing slightly better for her abdominal pain. Still with some nausea but controlled. Complaining of some right rib pain with inspiration and palpation. NAD  Abd soft, minimal LUQ tenderness, ND  Right chest wall tender to palpation    A/P: metastatic colon CA s/p colectomy with colostomy    Advance to regular diet for better variety  CXR negative. I think her chest pain is musculoskeletal in origin. Will start ibuprofen 600 mg po q6h prn.     Damaso Faustin MD

## 2022-04-27 NOTE — CONSULTS
Infectious Diseases Inpatient Consult Note      Reason for Consult:  Fevers, WBC elevation h/o Metastatic Colon cancer      Requesting Physician:  Danilo Miller     Primary Care Physician:  No primary care provider on file. History Obtained From:  [facility] and Patient     CHIEF COMPLAINT:     Chief Complaint   Patient presents with    Abdominal Pain     post surgical complication          HISTORY OF PRESENT ILLNESS:  36 y.o. woman with a past medical history of stage IV colon cancer recent diagnosis of metastatic disease to liver. She is awaiting chemotherapy. She has a chest port in place. She underwent laparoscopic transverse colectomy with colostomy by Dr. Seymour Kincaid on 4/13/22, she was discharged home recently now admitted with abdominal pain fever chills nausea. Her pain is mostly on the right upper quadrant radiating to the right shoulder. Admit labs indicate WBC elevated 14.1 hemoglobin 9.4 platelets 525 mild elevation in AST. Blood cultures from admission 4/24/22 negative. T-max 101.5 admission. CT chest negative. CT abdomen pelvis with postsurgical changes with colectomy and left-sided colostomy with widespread hepatic metastasis.   Location : Rt upper Quadrant pain      Quality :aching         Severity : 10/10    Duration :  4 days     Timing : constant   Context : Hepatic mets     Modifying factors : none  Associated signs and symptoms: Fevers, chills         Past Medical History:    Past Medical History:   Diagnosis Date    Asthma     Colon cancer [facility])        Past Surgical History:    Past Surgical History:   Procedure Laterality Date    COLONOSCOPY N/A 4/11/2022    COLONOSCOPY WITH BIOPSY performed by Roger Orantes MD at [address]  4/11/2022    COLONOSCOPY SUBMUCOSAL tattoo  INJECTION performed by Roger Orantes MD at [address] [facility] 4/13/2022    LAPAROSCOPIC TRANSVERSE COLECTOMY WITH COLOSTOMY, INSERTION OF PORT A CATHETER, LAPAROSCOPIC LIVER BIOPSY performed by Addison Ramsay MD at [place] [address]       Current Medications:    No outpatient medications have been marked as taking for the 4/24/22 encounter [facility] Encounter). Allergies:  Codeine and Pcn [penicillins]    Immunizations :   Immunization History   Administered Date(s) Administered    COVID-19, [facility] top, DO NOT Dilute, Romeo-Sucrose, 12+ yrs, PF, 30 mcg/0.3 mL dose 04/12/2022         Social History:     Social History     Tobacco Use    Smoking status: Never Smoker    Smokeless tobacco: Never Used   Substance Use Topics    Alcohol use: Yes     Comment: socially    Drug use: No     Social History     Tobacco Use   Smoking Status Never Smoker   Smokeless Tobacco Never Used      Family History : no DVT no COPD       REVIEW OF SYSTEMS:     Constitutional:  +  fevers, chills + , night sweats  Eyes:  negative for blurred vision, eye discharge, visual disturbance   HEENT:  negative for hearing loss, ear drainage,nasal congestion  Respiratory:  negative for cough, shortness of breath or hemoptysis   Cardiovascular:  negative for chest pain, palpitations, syncope  Gastrointestinal:  negative for nausea, vomiting, diarrhea, constipation, abdominal pain+++   Genitourinary:  negative for frequency, dysuria, urinary incontinence, hematuria  Hematologic/Lymphatic:  negative for easy bruising, bleeding and lymphadenopathy  Allergic/Immunologic:  negative for recurrent infections, angioedema, anaphylaxis   Endocrine:  negative for weight changes, polyuria, polydipsia and polyphagia  Musculoskeletal:  negative for joint  pain, swelling, decreased range of motion  Integumentary: No rashes, skin lesions  Neurological:  negative for headaches, slurred speech, unilateral weakness  Psychiatric: negative for hallucinations,confusion,agitation.      PHYSICAL EXAM:      Vitals: T max  101.5   /78   Pulse 71   Temp 98.3 °F (36.8 °C) (Oral)   Resp 16   Ht 5' 6\" (1.676 m)   Wt 213 lb 3 oz (96.7 kg)   LMP 04/10/2022   SpO2 98%   BMI 34.41 kg/m²     General Appearance: alert,in some acute distress, ++  pallor, no icterus   Skin: warm and dry, no rash or erythema  Head: normocephalic and atraumatic  Eyes: pupils equal, round, and reactive to light, conjunctivae normal  ENT: tympanic membrane, external ear and ear canal normal bilaterally, nose without deformity, nasal mucosa and turbinates normal without polyps  Neck: supple and non-tender without mass, no thyromegaly  no cervical lymphadenopathy  Pulmonary/Chest: clear to auscultation bilaterally- no wheezes, rales or rhonchi, normal air movement, no respiratory distress  Cardiovascular: n  S1 and S2, no murmurs, rubs, clicks, or gallops, no carotid bruits  Abdomen: soft, ++ tender, non-distended, normal bowel sounds, no masses or organomegaly  Ostomy++  Rt side pain+   Extremities: no cyanosis, clubbing or edema  Musculoskeletal: normal range of motion, no joint swelling, deformity or tenderness  Integumentary: No rashes, no abnormal skin lesions, no petechiae  Neurologic: reflexes normal and symmetric, no cranial nerve deficit  Psych:  Orientation, sensorium, mood normal   Lines: Chest  Port in place+     DATA:    CBC:   Lab Results   Component Value Date    WBC 9.7 04/27/2022    HGB 9.3 (L) 04/27/2022    HCT 27.9 (L) 04/27/2022    MCV 94.7 04/27/2022     04/27/2022     RENAL:   Lab Results   Component Value Date    CREATININE 0.7 04/27/2022    BUN 10 04/27/2022     (L) 04/27/2022    K 3.7 04/27/2022     04/27/2022    CO2 19 (L) 04/27/2022     SED RATE: No results found for: SEDRATE  CK: No results found for: CKTOTAL  CRP: No results found for: CRP  Hepatic Function Panel:   Lab Results   Component Value Date    ALKPHOS 103 04/27/2022    ALT 17 04/27/2022    AST 26 04/27/2022    PROT 5.9 04/27/2022    BILITOT 0.6 04/27/2022    LABALBU 2.8 04/27/2022     UA:  Lab Results   Component Value Date    COLORU Yellow 04/25/2022 CLARITYU Clear 04/25/2022    GLUCOSEU Negative 04/25/2022    BILIRUBINUR Negative 04/25/2022    KETUA Negative 04/25/2022    SPECGRAV 1.015 04/25/2022    BLOODU TRACE-INTACT 04/25/2022    PHUR 7.0 04/25/2022    PROTEINU Negative 04/25/2022    UROBILINOGEN 0.2 04/25/2022    NITRU Negative 04/25/2022    LEUKOCYTESUR Negative 04/25/2022    LABMICR YES 04/25/2022    URINETYPE NotGiven 04/25/2022      Urine Microscopic:   Lab Results   Component Value Date    BACTERIA 2+ 04/09/2022    COMU see below 04/21/2022    HYALCAST 1 04/25/2022    WBCUA 1 04/25/2022    RBCUA 2 04/25/2022    EPIU 6 04/25/2022     Urine Reflex to Culture:   Lab Results   Component Value Date    URRFLXCULT Not Indicated 04/25/2022       Wbc  13.3     Rashid Huston M.D.   (Electronic Signature)   04/19/2022        FINAL DIAGNOSIS:        A. Liver biopsy:        - Adenocarcinoma, consistent with colonic primary.        B. Colon, transverse, resection:        - Invasive adenocarcinoma- See Comment/ Case Summary.        - Resection margin negative for carcinoma.        - Adenocarcinoma involving three of twelve lymph nodes (3/12). MICRO: cultures reviewed and updated by me     Time       Culture, Blood 1 [[id number]] Collected: 04/24/22 2137   Order Status: Completed Specimen: Blood Updated: 04/26/22 0115    Blood Culture, Routine No Growth to date.  Any change in status will be called. Narrative:     ORDER#: [id number]                          ORDERED BY: JEAN-CLAUDE LIN   SOURCE: Blood                              COLLECTED:  04/24/22 21:37   ANTIBIOTICS AT [facility]. :                      RECEIVED :  04/24/22 21:42   Both bottles received   If child <=2 yrs old please draw pediatric bottle. ~Blood Culture 1   Culture, Blood 2 [[id number]] Collected: 04/24/22 2147   Order Status: Completed Specimen: Blood Updated: 04/26/22 0115    Culture, Blood 2 No Growth to date.  Any change in status will be called.    Narrative:     ORDER#: [id number]                          ORDERED BY: MITCH LINHNIITOOH   SOURCE: Blood                              COLLECTED:  04/24/22 21:47   ANTIBIOTICS AT [facility]. :                      RECEIVED :  04/24/22 21:53   Both bottles received   If child <=2 yrs old please draw pediatric bottle. ~Blood Culture #2       Blood Culture:   Lab Results   Component Value Date    [facility]  04/24/2022     No Growth to date. Any change in status will be called. BLOODCULT2  04/24/2022     No Growth to date. Any change in status will be called. Viral Culture:    Lab Results   Component Value Date    COVID19 Not Detected 04/09/2022     Urine Culture: No results for input(s): Joe Thomas in the last 72 hours. Scheduled Meds:   pantoprazole  40 mg Oral QAM AC    sodium chloride flush  5-40 mL IntraVENous 2 times per day    enoxaparin  40 mg SubCUTAneous Daily    metroNIDAZOLE  500 mg IntraVENous Q8H    cefepime  2,000 mg IntraVENous Q12H       Continuous Infusions:   sodium chloride 100 mL/hr at 04/25/22 1422       PRN Meds:  prochlorperazine, dicyclomine, sodium chloride flush, sodium chloride, acetaminophen **OR** acetaminophen, ondansetron, HYDROmorphone **OR** HYDROmorphone, iohexol    Imaging:   XR CHEST PORTABLE   Preliminary Result   1. No acute cardiopulmonary disease. 2. No radiographic evidence of an acute rib fracture. CT ABDOMEN PELVIS W IV CONTRAST Additional Contrast? Oral   Final Result   No acute findings noted      Postsurgical change from partial colectomy left-sided colostomy again seen. .   Trace free air remains in the midline, similar to prior. Widespread hepatic metastasis. Retroperitoneal denisha metastasis appear   similar. Small amount of free fluid is seen within the pelvis, also similar      RECOMMENDATIONS:   Unavailable         XR CHEST PORTABLE   Final Result   No acute process. CT abd/pelvis  4/9/22    : Multiple hepatic lesions most consistent with metastatic disease.    The largest lesion near the diaphragmatic surface of the liver measures 3.9 x   5.6 cm.  Gallbladder is partially contracted.  No biliary dilatation. Spleen, pancreas and adrenal glands are unremarkable.  No evidence of   obstructive uropathy.       GI/Bowel: There is suggestion of an annular mass in the distal transverse   colon concerning for underlying malignancy.  No evidence of obstruction or   significant inflammatory changes.  Scattered colonic diverticula with no   acute features.  No evidence of appendicitis.  No small bowel distension. Stomach and duodenal sweep are unremarkable.  Small hiatal hernia.       Pelvis: Trace free pelvic fluid, likely physiologic.  The uterus and adnexal   structures are unremarkable.  Multiple calcified pelvic phleboliths.  The   bladder is contracted.       Peritoneum/Retroperitoneum: The abdominal aorta is normal in caliber.  No   pathologic retroperitoneal adenopathy or upper abdominal ascites.  Mesenteric   adenopathy adjacent to the lesion in the transverse colon with the largest   node measuring 11 x 12 mm.       Bones/Soft Tissues: No acute osseous or soft tissue abnormality.  Small fat   containing umbilical hernia.           Impression   1. Multiple hepatic lesions most consistent with metastatic disease. Findings concerning for an annular mass in the distal transverse colon,   likely a colonic primary with adjacent adenopathy. 2. No other acute findings within the abdomen or pelvis.           All pertinent images and reports for the current Hospitalization were reviewed by me.     IMPRESSION:    Patient Active Problem List   Diagnosis    Lower GI bleed    Trichimoniasis    Liver lesion    Colonic mass    Rectal bleeding    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 ([address]    Abdominal pain in female    Malignancy ([address]    Sepsis ([address]      Sepsis  Fevers   WBC elevation   Wide spread Hepatic Mets  LFT elevation   Recent Diagnosis of Colon cancer with Mets  S/p Lap colectomy and colostomy on 4/13/22  CT abd/pelvis with wide spread mets to the liver and adenopathy   Rt side pain likely from Hepatic mets and LIver capsule enlargement   S/p surgery :  4/13/22  distal transverse colon cancer, multiple nodules of the liver consistent with metastatic disease       Labs, Microbiology, Radiology and pertinent results from current hospitalization and care every where were reviewed by me as a part of the consultation. PLAN :  1. Cont IV Cefepime x 2 gm Q 12 hrs  2. Cont IV Flagyl   3. WBC trend down   4. bLOOD Cx in process  5. Chest port working well  6. Able to choose oral abx at d/c   7. PCN allergy noted       Discussed with patient/Family and Nursing   Risk of Complications/Morbidity: High      · Illness(es)/ Infection present that pose threat to bodily function. · There is potential for severe exacerbation of infection/side effects of treatment. · Therapy requires intensive monitoring for antimicrobial agent toxicity. Thanks for allowing me to participate in your patient's care please call me with any questions or concerns.     Dr. Meron Law MD  [address] [place] [address] Physician  Phone: [phone]   Fax : [phone]

## 2022-04-27 NOTE — PROGRESS NOTES
Hospitalist Progress Note  4/27/2022 9:07 AM    PCP: No primary care provider on file. [phone]     Date of Admission: 4/24/2022                                                                                                                     HOSPITAL COURSE    Patient demographics:  The patient  Jaimee Kaur is a 36 y.o. female     Significant past medical history:   Patient Active Problem List   Diagnosis    Lower GI bleed    Trichimoniasis    Liver lesion    Colonic mass    Rectal bleeding    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    Abdominal pain    Malignancy [facility]    Sepsis ([facility]         Presenting symptoms:  Abdominal pain, nausea, vomiting, fever and chills       Diagnostic workup:      CONSULTS DURING ADMISSION :   IP CONSULT TO GENERAL SURGERY  IP CONSULT TO HOSPITALIST  IP CONSULT TO GENERAL SURGERY  IP CONSULT TO INFECTIOUS DISEASES      Patient was diagnosed with:  Sepsis  Abdominal pain  Malignancy      Treatment while inpatient:  36years old female with medical history significant for recent diagnosis of stage IV colon cancer, patient is status post colectomy and end colostomy created on 4/13. Patient was discharged from the hospital more recently on the 19th. Patient again presented to the emergency room with 3 to 4 days of                   intractable nausea vomiting and abdominal pain.   Patient was also noted to have leukocytosis and fever and patient was diagnosed with sepsis without unclear etiology for her sepsis                                                                  ----------------------------------------------------------      SUBJECTIVE COMPLAINTS- follow up for Abdominal pain,     Diet: Diet NPO Exceptions are: Ice Chips, Sips of Water with Meds      OBJECTIVE:   Patient Active Problem List   Diagnosis    Lower GI bleed    Trichimoniasis    Liver lesion    Colonic mass    Rectal bleeding    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 ([facility]    Abdominal pain    Malignancy ([facility]    Sepsis ([facility]       Allergies  Codeine and Pcn [penicillins]    Medications    Scheduled Meds:   pantoprazole  40 mg Oral QAM AC    sodium chloride flush  5-40 mL IntraVENous 2 times per day    enoxaparin  40 mg SubCUTAneous Daily    metroNIDAZOLE  500 mg IntraVENous Q8H    cefepime  2,000 mg IntraVENous Q12H     Continuous Infusions:   sodium chloride 100 mL/hr at 04/25/22 1422     PRN Meds:  prochlorperazine, dicyclomine, sodium chloride flush, sodium chloride, acetaminophen **OR** acetaminophen, ondansetron, HYDROmorphone **OR** HYDROmorphone, iohexol    Vitals   Vitals /wt   Patient Vitals for the past 8 hrs:   BP Temp Temp src Pulse Resp SpO2 Weight   04/27/22 0854 -- -- -- -- -- 96 % --   04/27/22 0712 131/68 98.4 °F (36.9 °C) Oral 75 16 95 % --   04/27/22 0546 -- -- -- -- -- -- 213 lb 3 oz (96.7 kg)   04/27/22 0430 (!) 144/71 98.6 °F (37 °C) Oral 71 19 96 % --        72HR INTAKE/OUTPUT:      Intake/Output Summary (Last 24 hours) at 4/27/2022 0907  Last data filed at 4/27/2022 0059  Gross per 24 hour   Intake 180 ml   Output 1150 ml   Net -970 ml       Exam:    Gen:   Alert and oriented ×3   Eyes: PERRL. No sclera icterus. No conjunctival injection. ENT: No discharge. Pharynx clear. External appearance of ears and nose normal.  Neck: Trachea midline. No obvious mass. Resp: No accessory muscle use. No crackles. No wheezes. No rhonchi. CV: Regular rate. Regular rhythm. No murmur or rub. No edema. GI: Non-tender. Non-distended. No hernia. Skin: Warm, dry, normal texture and turgor. Lymph: No cervical LAD. No supraclavicular LAD. M/S: / Ext. No cyanosis. No clubbing. No joint deformity. Neuro: CN 2-12 are intact,  no neurologic deficits noted. PT/INR: No results for input(s): PROTIME, INR in the last 72 hours. APTT: No results for input(s): APTT in the last 72 hours.     CBC:   Recent Labs     04/24/22  1959 04/25/22  0602 04/26/22  9551 04/27/22  0604   WBC 12.9* 14.1* 13.3* 9.7   HGB 11.5* 9.4* 9.3* 9.3*   HCT 35.0* 28.1* 27.7* 27.9*   MCV 93.9 94.7 94.1 94.7    291 293 317       BMP:   Recent Labs     04/25/22  0336 04/25/22  0602 04/26/22  0604 04/27/22  0604   * 134* 134* 135*   K 4.0 3.6 3.7 3.7    102 100 102   CO2 19* 20* 19* 19*   BUN 7 8 7 10   CREATININE 0.6 0.6 0.7 0.7       LIVER PROFILE:   Recent Labs     04/25/22  0336 04/25/22  0602 04/26/22  0604 04/27/22  0604   ALKPHOS 104 99 97 103   AST 86* 73* 43* 26   ALT 30 28 23 17   BILITOT 0.6 0.6 0.7 0.6     No results for input(s): AMYLASE in the last 72 hours. Recent Labs     04/24/22 1959   LIPASE 17.0       UA:  Recent Labs     04/25/22  0410   WBCUA 1   RBCUA 2       TROPONIN: No results for input(s): [place] in the last 72 hours. Lab Results   Component Value Date/Time    URRFLXCULT Not Indicated 04/25/2022 04:10 AM       No results for input(s): TSHREFLEX in the last 72 hours. No components found for: [id number]  POC GLUCOSE:  No results for input(s): POCGLU in the last 72 hours. No results for input(s): LABA1C in the last 72 hours. No results found for: LABA1C      ASSESSMENT AND PLAN    Chest pain  Rt sided  Check ct chest.    Sepsis  Unclear etiology  Continues to have nausea vomiting fever chills and leukocytosis  Continue with antibiotics, with cefepime and Flagyl  Consult to infectious disease    Abdominal pain  IV fluids  General diet  General surgery is following    Pain management  R52  Continue with the IV and oral pain medication      Metastatic colon cancer  Status post colectomy and end colostomy  CT scan of the abdomen showed no acute abnormality    Code Status: Full Code        Dispo - cc        The patient and / or the family were informed of the results of any tests, a time was given to answer questions, a plan was proposed and they agreed with plan.     Di Hernandez MD    This note was transcribed using Dragon Dictation software. Please disregard any translational errors.

## 2022-04-28 LAB
A/G RATIO: 0.9 (ref 1.1–2.2)
ALBUMIN SERPL-MCNC: 2.9 G/DL (ref 3.4–5)
ALP BLD-CCNC: 98 U/L (ref 40–129)
ALT SERPL-CCNC: 14 U/L (ref 10–40)
ANION GAP SERPL CALCULATED.3IONS-SCNC: 15 MMOL/L (ref 3–16)
AST SERPL-CCNC: 24 U/L (ref 15–37)
BASOPHILS ABSOLUTE: 0 K/UL (ref 0–0.2)
BASOPHILS RELATIVE PERCENT: 0.6 %
BILIRUB SERPL-MCNC: 0.4 MG/DL (ref 0–1)
BUN BLDV-MCNC: 9 MG/DL (ref 7–20)
CALCIUM SERPL-MCNC: 8.8 MG/DL (ref 8.3–10.6)
CHLORIDE BLD-SCNC: 107 MMOL/L (ref 99–110)
CO2: 20 MMOL/L (ref 21–32)
CREAT SERPL-MCNC: 0.6 MG/DL (ref 0.6–1.1)
EOSINOPHILS ABSOLUTE: 0.3 K/UL (ref 0–0.6)
EOSINOPHILS RELATIVE PERCENT: 4.6 %
GFR AFRICAN AMERICAN: >60
GFR NON-AFRICAN AMERICAN: >60
GLUCOSE BLD-MCNC: 95 MG/DL (ref 70–99)
HCT VFR BLD CALC: 26.3 % (ref 36–48)
HEMOGLOBIN: 9 G/DL (ref 12–16)
LYMPHOCYTES ABSOLUTE: 0.9 K/UL (ref 1–5.1)
LYMPHOCYTES RELATIVE PERCENT: 13.3 %
MAGNESIUM: 2 MG/DL (ref 1.8–2.4)
MCH RBC QN AUTO: 32 PG (ref 26–34)
MCHC RBC AUTO-ENTMCNC: 34 G/DL (ref 31–36)
MCV RBC AUTO: 94.1 FL (ref 80–100)
MONOCYTES ABSOLUTE: 0.9 K/UL (ref 0–1.3)
MONOCYTES RELATIVE PERCENT: 14.2 %
NEUTROPHILS ABSOLUTE: 4.3 K/UL (ref 1.7–7.7)
NEUTROPHILS RELATIVE PERCENT: 67.3 %
PDW BLD-RTO: 13.8 % (ref 12.4–15.4)
PLATELET # BLD: 322 K/UL (ref 135–450)
PMV BLD AUTO: 7.7 FL (ref 5–10.5)
POTASSIUM SERPL-SCNC: 3.9 MMOL/L (ref 3.5–5.1)
RBC # BLD: 2.8 M/UL (ref 4–5.2)
SODIUM BLD-SCNC: 142 MMOL/L (ref 136–145)
TOTAL PROTEIN: 6.1 G/DL (ref 6.4–8.2)
WBC # BLD: 6.5 K/UL (ref 4–11)

## 2022-04-28 PROCEDURE — 80053 COMPREHEN METABOLIC PANEL: CPT

## 2022-04-28 PROCEDURE — 6360000002 HC RX W HCPCS: Performed by: STUDENT IN AN ORGANIZED HEALTH CARE EDUCATION/TRAINING PROGRAM

## 2022-04-28 PROCEDURE — 2580000003 HC RX 258: Performed by: STUDENT IN AN ORGANIZED HEALTH CARE EDUCATION/TRAINING PROGRAM

## 2022-04-28 PROCEDURE — 99024 POSTOP FOLLOW-UP VISIT: CPT | Performed by: NURSE PRACTITIONER

## 2022-04-28 PROCEDURE — 2500000003 HC RX 250 WO HCPCS: Performed by: STUDENT IN AN ORGANIZED HEALTH CARE EDUCATION/TRAINING PROGRAM

## 2022-04-28 PROCEDURE — 1200000000 HC SEMI PRIVATE

## 2022-04-28 PROCEDURE — 85025 COMPLETE CBC W/AUTO DIFF WBC: CPT

## 2022-04-28 PROCEDURE — APPNB45 APP NON BILLABLE 31-45 MINUTES: Performed by: NURSE PRACTITIONER

## 2022-04-28 PROCEDURE — 94760 N-INVAS EAR/PLS OXIMETRY 1: CPT

## 2022-04-28 PROCEDURE — APPSS45 APP SPLIT SHARED TIME 31-45 MINUTES: Performed by: NURSE PRACTITIONER

## 2022-04-28 PROCEDURE — 6370000000 HC RX 637 (ALT 250 FOR IP): Performed by: STUDENT IN AN ORGANIZED HEALTH CARE EDUCATION/TRAINING PROGRAM

## 2022-04-28 PROCEDURE — 99233 SBSQ HOSP IP/OBS HIGH 50: CPT | Performed by: INTERNAL MEDICINE

## 2022-04-28 PROCEDURE — 99024 POSTOP FOLLOW-UP VISIT: CPT | Performed by: SURGERY

## 2022-04-28 PROCEDURE — 83735 ASSAY OF MAGNESIUM: CPT

## 2022-04-28 RX ADMIN — SODIUM CHLORIDE: 9 INJECTION, SOLUTION INTRAVENOUS at 18:03

## 2022-04-28 RX ADMIN — METRONIDAZOLE 500 MG: 500 INJECTION, SOLUTION INTRAVENOUS at 09:57

## 2022-04-28 RX ADMIN — PANTOPRAZOLE SODIUM 40 MG: 40 TABLET, DELAYED RELEASE ORAL at 09:41

## 2022-04-28 RX ADMIN — CEFEPIME HYDROCHLORIDE 2000 MG: 2 INJECTION, POWDER, FOR SOLUTION INTRAVENOUS at 14:15

## 2022-04-28 RX ADMIN — ENOXAPARIN SODIUM 40 MG: 100 INJECTION SUBCUTANEOUS at 09:40

## 2022-04-28 RX ADMIN — CEFEPIME HYDROCHLORIDE 2000 MG: 2 INJECTION, POWDER, FOR SOLUTION INTRAVENOUS at 00:48

## 2022-04-28 RX ADMIN — METRONIDAZOLE 500 MG: 500 INJECTION, SOLUTION INTRAVENOUS at 01:26

## 2022-04-28 RX ADMIN — SODIUM CHLORIDE: 9 INJECTION, SOLUTION INTRAVENOUS at 14:13

## 2022-04-28 RX ADMIN — SODIUM CHLORIDE, PRESERVATIVE FREE 10 ML: 5 INJECTION INTRAVENOUS at 09:40

## 2022-04-28 RX ADMIN — METRONIDAZOLE 500 MG: 500 INJECTION, SOLUTION INTRAVENOUS at 18:05

## 2022-04-28 RX ADMIN — ACETAMINOPHEN 650 MG: 325 TABLET ORAL at 17:44

## 2022-04-28 RX ADMIN — SODIUM CHLORIDE, PRESERVATIVE FREE 10 ML: 5 INJECTION INTRAVENOUS at 21:00

## 2022-04-28 RX ADMIN — SODIUM CHLORIDE: 9 INJECTION, SOLUTION INTRAVENOUS at 09:55

## 2022-04-28 ASSESSMENT — PAIN DESCRIPTION - ORIENTATION
ORIENTATION: LEFT
ORIENTATION: LEFT

## 2022-04-28 ASSESSMENT — PAIN DESCRIPTION - LOCATION
LOCATION: ABDOMEN
LOCATION: ABDOMEN

## 2022-04-28 ASSESSMENT — PAIN SCALES - GENERAL
PAINLEVEL_OUTOF10: 0
PAINLEVEL_OUTOF10: 4
PAINLEVEL_OUTOF10: 0

## 2022-04-28 ASSESSMENT — PAIN DESCRIPTION - DESCRIPTORS: DESCRIPTORS: CRAMPING

## 2022-04-28 NOTE — PROGRESS NOTES
Alert and oriented, slightly confused resting in bed, son at bedside, pain medication morphine 2 mg  administered as ordered and Patient C/O not been satisfied, pain assessment at the scale of 0-10 done. Patient states 9. Up in chair all safety precautions in place.

## 2022-04-28 NOTE — PROGRESS NOTES
Infectious Disease Follow up Notes  Admit Date: 4/24/2022  Hospital Day: 5    Antibiotics :   IV Cefepime  IV Flagyl     CHIEF COMPLAINT:     Abd pain   WBC elevation  Sepsis  Fevers  Colon cancer    Subjective interval History :  36 y.o. woman with a past medical history of stage IV colon cancer recent diagnosis of metastatic disease to liver. She is awaiting chemotherapy. She has a chest port in place. She underwent laparoscopic transverse colectomy with colostomy by Dr. Kyle Burnette on 4/13/22, she was discharged home recently now admitted with abdominal pain fever chills nausea. Her pain is mostly on the right upper quadrant radiating to the right shoulder. Admit labs indicate WBC elevated 14.1 hemoglobin 9.4 platelets 837 mild elevation in AST. Blood cultures from admission 4/24/22 negative. T-max 101.5 admission. CT chest negative. CT abdomen pelvis with postsurgical changes with colectomy and left-sided colostomy with widespread hepatic metastasis.   Location : Rt upper Quadrant pain      Quality :aching         Severity : 10/10    Duration :  4 days     Timing : constant   Context : Hepatic mets     Modifying factors : none  Associated signs and symptoms: Fevers, chills        Interval History : Rt side pain going down able to take oral diet and fever trend down tolerating IV abx ok some nausea ostomy+       Past Medical History:    Past Medical History:   Diagnosis Date    Asthma     Colon cancer [facility])        Past Surgical History:    Past Surgical History:   Procedure Laterality Date    COLONOSCOPY N/A 4/11/2022    COLONOSCOPY WITH BIOPSY performed by Sergio Gomez MD at [address]  4/11/2022    COLONOSCOPY SUBMUCOSAL tattoo  INJECTION performed by Sergio Gomez MD at [address] [facility] 4/13/2022    LAPAROSCOPIC TRANSVERSE COLECTOMY WITH COLOSTOMY, INSERTION OF PORT A CATHETER, LAPAROSCOPIC LIVER BIOPSY [facility]        Current Medications:    No outpatient medications have been marked as taking for the 4/24/22 encounter [facility] Encounter). Allergies:  Codeine and Pcn [penicillins]    Immunizations :   Immunization History   Administered Date(s) Administered    COVID-19, [address] [facility], DO NOT Dilute, Romeo-Sucrose, 12+ yrs, PF, 30 mcg/0.3 mL dose 04/12/2022       Social History:    Social History     Tobacco Use    Smoking status: Never Smoker    Smokeless tobacco: Never Used   Substance Use Topics    Alcohol use: Yes     Comment: socially    Drug use: No     Social History     Tobacco Use   Smoking Status Never Smoker   Smokeless Tobacco Never Used      Family History : Aunt with colon cancer       REVIEW OF SYSTEMS:      Constitutional:  negative for fevers, chills, night sweats  Eyes:  negative for blurred vision, eye discharge, visual disturbance   HEENT:  negative for hearing loss, ear drainage,nasal congestion  Respiratory:  negative for cough, shortness of breath or hemoptysis   Cardiovascular:  negative for chest pain, palpitations, syncope  Gastrointestinal:  negative for nausea, vomiting, diarrhea, constipation, abdominal pain ++  Genitourinary:  negative for frequency, dysuria, urinary incontinence, hematuria  Hematologic/Lymphatic:  negative for easy bruising, bleeding and lymphadenopathy  Allergic/Immunologic:  negative for recurrent infections, angioedema, anaphylaxis   Endocrine:  negative for weight changes, polyuria, polydipsia and polyphagia  Musculoskeletal:  negative for joint  pain, swelling, decreased range of motion  Integumentary: No rashes, skin lesions  Neurological:  negative for headaches, slurred speech, unilateral weakness  Psychiatric: negative for hallucinations,confusion,agitation.                 PHYSICAL EXAM:      Vitals:  T max  101.5  /80   Pulse 66   Temp 98.6 °F (37 °C) (Oral) Resp 16   Ht 5' 6\" (1.676 m)   Wt 217 lb 2.5 oz (98.5 kg)   LMP 04/10/2022   SpO2 98%   BMI 35.05 kg/m²     General Appearance: alert,in some acute distress, ++  pallor, no icterus   Skin: warm and dry, no rash or erythema  Head: normocephalic and atraumatic  Eyes: pupils equal, round, and reactive to light, conjunctivae normal  ENT: tympanic membrane, external ear and ear canal normal bilaterally, nose without deformity, nasal mucosa and turbinates normal without polyps  Neck: supple and non-tender without mass, no thyromegaly  no cervical lymphadenopathy  Pulmonary/Chest: clear to auscultation bilaterally- no wheezes, rales or rhonchi, normal air movement, no respiratory distress  Cardiovascular: n  S1 and S2, no murmurs, rubs, clicks, or gallops, no carotid bruits  Abdomen: soft, ++ tender, non-distended, normal bowel sounds, no masses or organomegaly  Ostomy++  Rt side pain+   Extremities: no cyanosis, clubbing or edema  Musculoskeletal: normal range of motion, no joint swelling, deformity or tenderness  Integumentary: No rashes, no abnormal skin lesions, no petechiae  Neurologic: reflexes normal and symmetric, no cranial nerve deficit  Psych:  Orientation, sensorium, mood normal            Lines: Chest  Port in place+      Data Review:    CBC:   Lab Results   Component Value Date    WBC 6.5 04/28/2022    HGB 9.0 (L) 04/28/2022    HCT 26.3 (L) 04/28/2022    MCV 94.1 04/28/2022     04/28/2022     RENAL:   Lab Results   Component Value Date    CREATININE 0.6 04/28/2022    BUN 9 04/28/2022     04/28/2022    K 3.9 04/28/2022     04/28/2022    CO2 20 (L) 04/28/2022     SED RATE: No results found for: SEDRATE  CK: No results found for: CKTOTAL  CRP: No results found for: CRP  Hepatic Function Panel:   Lab Results   Component Value Date    ALKPHOS 98 04/28/2022    ALT 14 04/28/2022    AST 24 04/28/2022    PROT 6.1 04/28/2022    BILITOT 0.4 04/28/2022    LABALBU 2.9 04/28/2022     UA:  Lab Results   Component Value Date    COLORU Yellow 04/25/2022    CLARITYU Clear 04/25/2022    GLUCOSEU Negative 04/25/2022    BILIRUBINUR Negative 04/25/2022    KETUA Negative 04/25/2022    SPECGRAV 1.015 04/25/2022    BLOODU TRACE-INTACT 04/25/2022    PHUR 7.0 04/25/2022    PROTEINU Negative 04/25/2022    UROBILINOGEN 0.2 04/25/2022    NITRU Negative 04/25/2022    LEUKOCYTESUR Negative 04/25/2022    LABMICR YES 04/25/2022    URINETYPE NotGiven 04/25/2022      Urine Microscopic:   Lab Results   Component Value Date    BACTERIA 2+ 04/09/2022    COMU see below 04/21/2022    HYALCAST 1 04/25/2022    WBCUA 1 04/25/2022    RBCUA 2 04/25/2022    EPIU 6 04/25/2022     Urine Reflex to Culture:   Lab Results   Component Value Date    URRFLXCULT Not Indicated 04/25/2022         MICRO: cultures reviewed and updated by me   Blood Culture:   Lab Results   Component Value Date    [facility] SYSTEM  04/24/2022     No Growth to date. Any change in status will be called. BLOODCULT2  04/24/2022     No Growth to date. Any change in status will be called. Respiratory Culture:  No results found for: [place]  AFB:No results found for: AFBSMEAR  Viral Culture:  Lab Results   Component Value Date    COVID19 Not Detected 04/09/2022     Urine Culture: No results for input(s): Therisa Hockey in the last 72 hours. IMAGING:    CT CHEST WO CONTRAST   Final Result   1. No definite findings of metastatic disease in the chest.  Of note, nodular   opacity seen in the basilar left lower lobe on 04/21/2022 could persist, but   passive atelectasis is present in this area and may obscured it if present. 2. No significant change in incompletely evaluated hepatic metastatic disease. 3. Minimal interstitial pulmonary edema, trace bilateral pleural effusions,   and trace pericardial effusion suggest volume overload. Consider congestive   heart failure given mild cardiomegaly.    4. Mild bronchial wall thickening potentially due to pulmonary vascular congestion, reactive airways disease, or bronchitis. XR CHEST PORTABLE   Preliminary Result   1. No acute cardiopulmonary disease. 2. No radiographic evidence of an acute rib fracture. CT ABDOMEN PELVIS W IV CONTRAST Additional Contrast? Oral   Final Result   No acute findings noted      Postsurgical change from partial colectomy left-sided colostomy again seen. .   Trace free air remains in the midline, similar to prior. Widespread hepatic metastasis. Retroperitoneal denisha metastasis appear   similar. Small amount of free fluid is seen within the pelvis, also similar      RECOMMENDATIONS:   Unavailable         XR CHEST PORTABLE   Final Result   No acute process. CT abd/pelvis  4/9/22     : Multiple hepatic lesions most consistent with metastatic disease. The largest lesion near the diaphragmatic surface of the liver measures 3.9 x   5.6 cm.  Gallbladder is partially contracted.  No biliary dilatation. Spleen, pancreas and adrenal glands are unremarkable.  No evidence of   obstructive uropathy.       GI/Bowel: There is suggestion of an annular mass in the distal transverse   colon concerning for underlying malignancy.  No evidence of obstruction or   significant inflammatory changes.  Scattered colonic diverticula with no   acute features.  No evidence of appendicitis.  No small bowel distension. Stomach and duodenal sweep are unremarkable.  Small hiatal hernia.       Pelvis: Trace free pelvic fluid, likely physiologic.  The uterus and adnexal   structures are unremarkable.  Multiple calcified pelvic phleboliths.  The   bladder is contracted.       Peritoneum/Retroperitoneum: The abdominal aorta is normal in caliber.  No   pathologic retroperitoneal adenopathy or upper abdominal ascites.  Mesenteric   adenopathy adjacent to the lesion in the transverse colon with the largest   node measuring 11 x 12 mm.       Bones/Soft Tissues: No acute osseous or soft tissue abnormality.  Small fat   containing umbilical hernia.           Impression   1. Multiple hepatic lesions most consistent with metastatic disease. Findings concerning for an annular mass in the distal transverse colon,   likely a colonic primary with adjacent adenopathy.    2. No other acute findings within the abdomen or pelvis.              All the pertinent images and reports for the current Hospitalization were reviewed by me     Scheduled Meds:   pantoprazole  40 mg Oral QAM AC    sodium chloride flush  5-40 mL IntraVENous 2 times per day    enoxaparin  40 mg SubCUTAneous Daily    metroNIDAZOLE  500 mg IntraVENous Q8H    cefepime  2,000 mg IntraVENous Q12H       Continuous Infusions:   sodium chloride 100 mL/hr at 04/28/22 0955       PRN Meds:  ibuprofen, prochlorperazine, dicyclomine, sodium chloride flush, sodium chloride, acetaminophen **OR** acetaminophen, ondansetron, HYDROmorphone **OR** HYDROmorphone, iohexol      Assessment:     Patient Active Problem List   Diagnosis    Lower GI bleed    Trichimoniasis    Liver lesion    Colonic mass    Rectal bleeding    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 ([address]    Abdominal pain in female    Malignancy ([address]    Sepsis ([address]        Sepsis resolving   Fevers  Better   WBC elevation   Wide spread Hepatic Mets  LFT elevation   Recent Diagnosis of Colon cancer with Mets  S/p Lap colectomy and colostomy on 4/13/22  CT abd/pelvis with wide spread mets to the liver and adenopathy   Rt side pain likely from Hepatic mets and LIver capsule enlargement   S/p surgery :  4/13/22  distal transverse colon cancer, multiple nodules of the liver consistent with metastatic disease  CT chest negative for PE or Mets     She is about to start chemo and hopefully fevers resolve and finish abx therapy to be ready for chemotherapy       Labs, Microbiology, Radiology and all the pertinent results from current hospitalization and  care every where were reviewed  by me as a part of the evaluation   Plan:   1. Cont IV Cefepime x 2 gm Q 12 hrs as in patient only   2. Cont IV Flagyl as in patient   3. WBC trend down   4. bLOOD Cx in process NGTD  5. Chest port working well  6. Able to choose oral abx at d/c Levofloxacin x 500 mg once a day for x  5  Days   7. PCN allergy noted  8. She did not tolerate oral Flagyl due to nausea  9. D/C PLANS per primary          Discussed with patient/Family and Nursing   Risk of Complications/Morbidity: High      · Illness(es)/ Infection present that pose threat to bodily function. · There is potential for severe exacerbation of infection/side effects of treatment. · Therapy requires intensive monitoring for antimicrobial agent toxicity. Discussed with patient/Family and Nursing staff     Thanks for allowing me to participate in your patient's care and please call me with any questions or concerns.     Jomar Evans MD  Infectious Disease  [facility]) Physician  Phone: [phone]   Fax : [phone]

## 2022-04-28 NOTE — PLAN OF CARE
Problem: Discharge Planning  Goal: Discharge to home or other facility with appropriate resources  Outcome: Progressing     Problem: Skin/Tissue Integrity  Goal: Absence of new skin breakdown  Description: 1. Monitor for areas of redness and/or skin breakdown  2. Assess vascular access sites hourly  3. Every 4-6 hours minimum:  Change oxygen saturation probe site  4. Every 4-6 hours:  If on nasal continuous positive airway pressure, respiratory therapy assess nares and determine need for appliance change or resting period. Outcome: Progressing  Note: Patient skin will be free from wrinkles, by encourage repositioned every 2 hours to avoid new skin breakdown. Problem: Safety - Adult  Goal: Free from fall injury  Outcome: Progressing  Flowsheets (Taken 4/28/2022 1711)  Free From Fall Injury:   Instruct family/caregiver on patient safety   Based on caregiver fall risk screen, instruct family/caregiver to ask for assistance with transferring infant if caregiver noted to have fall risk factors     Problem: ABCDS Injury Assessment  Goal: Absence of physical injury  Outcome: Progressing  Note: Patient been educate of the use of call light during my shift.      Problem: Pain  Goal: Verbalizes/displays adequate comfort level or baseline comfort level  Outcome: Progressing  Flowsheets (Taken 4/28/2022 1711)  Verbalizes/displays adequate comfort level or baseline comfort level:   Encourage patient to monitor pain and request assistance   Assess pain using appropriate pain scale   Implement non-pharmacological measures as appropriate and evaluate response

## 2022-04-28 NOTE — PROGRESS NOTES
Pt lost IV access this afternoon, and after multiple failed attempts, This RN contacted Dr. Todd Ross regarding accessing new chest port; OK to access per Dr. Todd Ross. Left chest port accessed, using sterile technique, and with no complication, pt tolerated well. Brisk blood return, flushed and infusing antibiotics. Pt states nausea and pain has improved over this shift. Pt calls appropriately, will continue to monitor and reassess.    Electronically signed by Suki Medina RN on 4/27/2022 at 8:36 PM

## 2022-04-28 NOTE — PROGRESS NOTES
Patient alert and oriented, resting in bed, denies pain on the scale of 0-10 Patient states 0. Pt  [address] son at bedtime, split on left arm and secure in place. No s/s of swelling noted or numbness .

## 2022-04-28 NOTE — PROGRESS NOTES
Pt rounded on this morning Q2h, whiteboard updated, pt given ice water and needs assessed. Pt c/o pain and nausea, given PRN analgesic and antiemetic (see eMAR). Pt states she is tolerating PO well, per [place], ok to advance diet as tolerated. Pt having small amounts of loose output in colostomy. Pt has no further needs or concerns at this time. Call light within reach, pt verbalized understanding to call prior to ambulating. Will continue to monitor and reassess.    Electronically signed by Matilde Sorensen RN on 4/27/2022 at 8:30 PM

## 2022-04-28 NOTE — PROGRESS NOTES
Patient alert and oriented, in bed HOB elevated, port access ( IV ) on Lt chest, site dry and intact no drainage noted. Colostomy in Lt abdomen, colostomy  bag in place and no leak noted, wound care nurse changed dressing and bag,, Patient tolerated well. All safety precautions in place.

## 2022-04-28 NOTE — CARE COORDINATION
Ostomy Referral Follow-up Progress Note      NAME:  Cameron Wild  MEDICAL RECORD NUMBER:  [id number]  AGE: 36 y.o. 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 Per patient, home care never arrived for f/u. Pt admitted on  for abd pain and vomiting.  -Plans for d/c today. Education session and pouch change today prior to d/c. Objective    BP (!) 140/80   Pulse 69   Temp 97.8 °F (36.6 °C) (Oral)   Resp 15   Ht 5' 6\" (1.676 m)   Wt 217 lb 2.5 oz (98.5 kg)   LMP 04/10/2022   SpO2 94%   BMI 35.05 kg/m²     Derrick Risk Score Derrick Scale Score: 19    Assessment      Colostomy LUQ Transverse (Active)   Stomal Appliance 2 piece; Changed 22 1215   Stoma  Assessment Flush;Moist;Red 22 1215   Peristomal Assessment Clean; Intact 22 1215   Treatment Bag change;Site care 22 1215   Stool Appearance Loose 22 1215   Stool Color Brown 22 1215   Stool Amount Small 22 1215   Output (mL) 25 ml 22 1215   Number of days: 14     Pt awake and alert in bed. Pt pain has significantly improved. Pt attitude has also improved. Pt was able to perform all aspects of pouch removal, emptying, and pouch change unassisted. Pt did very well. Pt still requesting home care. Recommended follow up with outpatient ostomy NP. Pt agreeable. Added ostomy belt and barrier strips for additional support.          Intake/Output Summary (Last 24 hours) at 2022 1216  Last data filed at 2022 1215  Gross per 24 hour   Intake 840 ml   Output 40 ml   Net 800 ml       Plan:   Plan for Ostomy Care:  Sen[employer] [place] Flex convex #94944 (-2-\" YELLOW)  Sensura Daniel Flex Drainable Pouch #61496 YELLOW  Brava protective Seal thin #33720  [place] STER[place]ING 61\" long #[id number]  Brava Elastic Barrier Strips #[id number]  Pt discharging. Updated [facility]. Ostomy Plan of Care  [x] Supplies/Instructions left in room  [] Patient using home supplies  [x] Brand/supplies at bedside coloplast    Current Diet: ADULT DIET;  Regular  Dietician consult:  No    Discharge Plan:  Placement for patient upon discharge: home with support    Outpatient visit plan Yes  Supplies given Yes   Samples requested No    Referrals:  [x]   [x] [facility]  [] Supplies  [] Other      Patient/Caregiver Teaching:  Written Instructions given to patient/family  Teaching provided:  [x] Reviewed GI and A&P        [x] Supplies  [x] Pouch emptying      [] Manipulate closure  [x] Routine Care         [] Comment  [x] Pouch maintenance           Level of patient/caregiver understanding able to:  [] Indicates understanding       [] Needs reinforcement  [] Unsuccessful      [x] Verbal Understanding  [x] Demonstrated understanding       [] No evidence of learning  [] Refused teaching         [] N/A    Electronically signed by Alex Virk RN, CWOCN on 4/28/2022 at 12:16 PM

## 2022-04-28 NOTE — PROGRESS NOTES
[facility] General Surgery                                   Daily Progress Note                                                         Pt Name: Lisa Peterson  Medical Record Number: [id number]  Date of Birth 1980   Today's Date: 4/28/2022  CC: nausea, weakness    ASSESSMENT/PLAN  35 yo female with metastatic colon CA s/p lap transverse colectomy with colostomy on 4/13, readmitted with with nausea, vomiting, LUQ pain   -feeling much better today. Tolerating diet, ostomy working. -OK to DC from surgery standpoint. She's two weeks post op, f/u Dr. [place] Lack PRN, and after treatment to discuss ostomy reversal.         Appreciate ostomy nurse assistance     Ambulate/OOB            SUBJECTIVE  Epifanio Guan is resting in bed. Feeling much better today. Minimal to no nausea. No fevers or chills. Ostomy working. Flank pain gone. OBJECTIVE  VITALS:  height is 5' 6\" (1.676 m) and weight is 217 lb 2.5 oz (98.5 kg). Her oral temperature is 98.5 °F (36.9 °C). Her blood pressure is 150/83 (abnormal) and her pulse is 58. Her respiration is 19 and oxygen saturation is 99%. INTAKE/OUTPUT:      Intake/Output Summary (Last 24 hours) at 4/28/2022 0640  Last data filed at 4/28/2022 0126  Gross per 24 hour   Intake 840 ml   Output 15 ml   Net 825 ml     GENERAL: alert, cooperative, no distress  LUNGS: clear to ausculation, without wheezes, rales or rhonci  HEART: normal rate and regular rhythm  ABDOMEN: Soft, non tender, non distended. Ostomy stool and gas in appliance. Stoma pink. EXTREMITY: no cyanosis, clubbing or edema       I/O last 3 completed shifts:   In: 780 [P.O.:780]  Out: 1150 [Urine:1150]      LABS  Recent Labs     04/27/22  0604   WBC 9.7   HGB 9.3*   HCT 27.9*      *   K 3.7      CO2 19*   BUN 10   CREATININE 0.7   MG 2.00   CALCIUM 8.8   AST 26   ALT 17   BILITOT 0.6       EDUCATION  Patient educated about Disease Process, Medications, Smoking Cessation, Oxygenation, Incentive Spirometry and Deep Breath and Cough, Diabetes, Hyperlipidemia, Smoking Cessation, Nutrition, Exercise and Hypertension    Electronically signed by ANIKA May CNP on 4/28/2022 at 6:40 AM      [facility]   [phone] Office  [phone] Fax    Agree with above note. The patient was personally seen and examined. Adrianne Shultz is doing much better. Nausea resolved and LUQ and right chest pain greatly improved. Tolerating diet and wants to go home.     Abd soft, NT, ND, ostomy appliance in place    WBC 9.7  Cr 0.7    A/P: s/p transverse colectomy with colostomy    OK for discharge per surgery  Follow up with me as needed  OK to start chemotherapy from surgical standpoint, follow up with Dr. Emery Tomasa antibiotics per DOMINGA Atkinson MD

## 2022-04-28 NOTE — PROGRESS NOTES
Patient alert and oriented, in  Bed and   head elevated on soft pillows. colostomy bag on Lt upper abdomen ,bag in place and moist around the site, no drainage noted, pain  Patient denies pain at the scale of 0-10 . All fall precautions in place .

## 2022-04-28 NOTE — PLAN OF CARE
Problem: Discharge Planning  Goal: Discharge to home or other facility with appropriate resources  Outcome: Progressing  Note: Assessed patients knowledge of discharge. Will continue to work with patient on discharge planning and answer patient questions. Will consult case management and  as necessary. Problem: Skin/Tissue Integrity  Goal: Absence of new skin breakdown  Description: 1. Monitor for areas of redness and/or skin breakdown  2. Assess vascular access sites hourly  3. Every 4-6 hours minimum:  Change oxygen saturation probe site  4. Every 4-6 hours:  If on nasal continuous positive airway pressure, respiratory therapy assess nares and determine need for appliance change or resting period. Outcome: Progressing  Note: Will monitor skin and mucous members. Will turn patient every 2 hours, monitor for friction and sheering, and change dressings as needed. Will preform skin assessment every shift. Problem: Safety - Adult  Goal: Free from fall injury  Outcome: Progressing  Note: Patient educated on fall prevention. Call light is within reach, bed locked in lowest position, personal items within reach, and bed alarm is on. Will round on patient per unit guidelines. Problem: ABCDS Injury Assessment  Goal: Absence of physical injury  Outcome: Progressing  Note: Pt assessed for fall risk and fall precautions put into place. Bed in lowest position and wheels locked, call light within reach. Nonskid footwear in place. Patient educated on appropriate method of transfer and to call for assistance. Problem: Pain  Goal: Verbalizes/displays adequate comfort level or baseline comfort level  Outcome: Progressing  Note: Educated patient on pain management. Will assess patients pain level per unit protocol, and provide pain management measures as needed.

## 2022-04-28 NOTE — PROGRESS NOTES
Hospitalist Progress Note  4/28/2022 9:44 AM    PCP: No primary care provider on file. [phone]     Date of Admission: 4/24/2022                                                                                                                     HOSPITAL COURSE    Patient demographics:  The patient  Malik Card is a 36 y.o. female     Significant past medical history:   Patient Active Problem List   Diagnosis    Lower GI bleed    Trichimoniasis    Liver lesion    Colonic mass    Rectal bleeding    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 (HCC)    Abdominal pain in female    [facility] ([facility]    Sepsis ([facility]         Presenting symptoms:  Abdominal pain, nausea, vomiting, fever and chills       Diagnostic workup:      CONSULTS DURING ADMISSION :   IP CONSULT TO GENERAL SURGERY  IP CONSULT TO HOSPITALIST  IP CONSULT TO GENERAL SURGERY  IP CONSULT TO INFECTIOUS DISEASES      Patient was diagnosed with:  Sepsis  Abdominal pain  Malignancy      Treatment while inpatient:  36years old female with medical history significant for recent diagnosis of stage IV colon cancer, patient is status post colectomy and end colostomy created on 4/13. Patient was discharged from the hospital more recently on the 19th. Patient again presented to the emergency room with 3 to 4 days of                   intractable nausea vomiting and abdominal pain. Patient was also noted to have leukocytosis and fever and patient was diagnosed with sepsis without unclear etiology for her sepsis                                                                  ----------------------------------------------------------      SUBJECTIVE COMPLAINTS- follow up for Abdominal pain,     Diet: ADULT DIET;  Regular      OBJECTIVE:   Patient Active Problem List   Diagnosis    Lower GI bleed    Trichimoniasis    Liver lesion    Colonic mass    Rectal bleeding    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 (HCC)    Abdominal pain in female    [facility]    [facility]       Allergies  Codeine and Pcn [penicillins]    Medications    Scheduled Meds:   pantoprazole  40 mg Oral QAM AC    sodium chloride flush  5-40 mL IntraVENous 2 times per day    enoxaparin  40 mg SubCUTAneous Daily    metroNIDAZOLE  500 mg IntraVENous Q8H    cefepime  2,000 mg IntraVENous Q12H     Continuous Infusions:   sodium chloride 25 mL/hr at 04/28/22 0047     PRN Meds:  ibuprofen, prochlorperazine, dicyclomine, sodium chloride flush, sodium chloride, acetaminophen **OR** acetaminophen, ondansetron, HYDROmorphone **OR** HYDROmorphone, iohexol    Vitals   Vitals /wt   Patient Vitals for the past 8 hrs:   BP Temp Temp src Pulse Resp SpO2 Weight   04/28/22 0707 126/80 98.6 °F (37 °C) Oral 66 16 98 % --   04/28/22 0440 -- -- -- -- -- -- 217 lb 2.5 oz (98.5 kg)   04/28/22 0430 (!) 150/83 98.5 °F (36.9 °C) Oral 58 19 99 % --        72HR INTAKE/OUTPUT:      Intake/Output Summary (Last 24 hours) at 4/28/2022 0944  Last data filed at 4/28/2022 0126  Gross per 24 hour   Intake 840 ml   Output 15 ml   Net 825 ml       Exam:    Gen:   Alert and oriented ×3   Eyes: PERRL. No sclera icterus. No conjunctival injection. ENT: No discharge. Pharynx clear. External appearance of ears and nose normal.  Neck: Trachea midline. No obvious mass. Resp: No accessory muscle use. No crackles. No wheezes. No rhonchi. CV: Regular rate. Regular rhythm. No murmur or rub. No edema. GI: Non-tender. Non-distended. No hernia. Skin: Warm, dry, normal texture and turgor. Lymph: No cervical LAD. No supraclavicular LAD. M/S: / Ext. No cyanosis. No clubbing. No joint deformity. Neuro: CN 2-12 are intact,  no neurologic deficits noted. PT/INR: No results for input(s): PROTIME, INR in the last 72 hours. APTT: No results for input(s): APTT in the last 72 hours.     CBC:   Recent Labs     04/26/22  0604 04/27/22  0604 04/28/22  0635   WBC 13.3* 9.7 6.5   HGB 9.3* 9.3* 9.0*   HCT 27.7* 27.9* 26.3*   MCV 94.1 94.7 94.1    317 322       BMP:   Recent Labs     04/26/22  0604 04/27/22  0604 04/28/22  0635   * 135* 142   K 3.7 3.7 3.9    102 107   CO2 19* 19* 20*   BUN 7 10 9   CREATININE 0.7 0.7 0.6       LIVER PROFILE:   Recent Labs     04/26/22  0604 04/27/22  0604 04/28/22  0635   ALKPHOS 97 103 98   AST 43* 26 24   ALT 23 17 14   BILITOT 0.7 0.6 0.4     No results for input(s): AMYLASE in the last 72 hours. No results for input(s): LIPASE in the last 72 hours. UA:  No results for input(s): NITRITE, LABCAST, WBCUA, RBCUA, MUCUS in the last 72 hours. TROPONIN: No results for input(s): Kadi Belts in the last 72 hours. Lab Results   Component Value Date/Time    URRFLXCULT Not Indicated 04/25/2022 04:10 AM       No results for input(s): TSHREFLEX in the last 72 hours. No components found for: PPB1569  POC GLUCOSE:  No results for input(s): POCGLU in the last 72 hours. No results for input(s): LABA1C in the last 72 hours. No results found for: LABA1C      ASSESSMENT AND PLAN    Chest pain  Check ct chest.  No evidence of metastatic disease    Sepsis  Unclear etiology  Continues to have nausea vomiting fever chills and leukocytosis  Continue with antibiotics, with cefepime and Flagyl  Consult to infectious disease  Likely home on oral anbx in am    Abdominal pain  IV fluids  General diet  General surgery is following    Pain management  R52  Continue with the IV and oral pain medication      Metastatic colon cancer  Status post colectomy and end colostomy  CT scan of the abdomen showed no acute abnormality    Code Status: Full Code        Dispo - cc        The patient and / or the family were informed of the results of any tests, a time was given to answer questions, a plan was proposed and they agreed with plan. Eddi Quintero MD    This note was transcribed using [id number] [employer]. Please disregard any translational errors.

## 2022-04-29 VITALS
DIASTOLIC BLOOD PRESSURE: 79 MMHG | HEIGHT: 66 IN | WEIGHT: 212.3 LBS | BODY MASS INDEX: 34.12 KG/M2 | TEMPERATURE: 98 F | OXYGEN SATURATION: 98 % | HEART RATE: 67 BPM | RESPIRATION RATE: 18 BRPM | SYSTOLIC BLOOD PRESSURE: 142 MMHG

## 2022-04-29 LAB
A/G RATIO: 0.9 (ref 1.1–2.2)
ALBUMIN SERPL-MCNC: 2.9 G/DL (ref 3.4–5)
ALP BLD-CCNC: 142 U/L (ref 40–129)
ALT SERPL-CCNC: 17 U/L (ref 10–40)
ANION GAP SERPL CALCULATED.3IONS-SCNC: 13 MMOL/L (ref 3–16)
AST SERPL-CCNC: 43 U/L (ref 15–37)
BASOPHILS ABSOLUTE: 0.1 K/UL (ref 0–0.2)
BASOPHILS RELATIVE PERCENT: 0.9 %
BILIRUB SERPL-MCNC: <0.2 MG/DL (ref 0–1)
BLOOD CULTURE, ROUTINE: NORMAL
BUN BLDV-MCNC: 10 MG/DL (ref 7–20)
CALCIUM SERPL-MCNC: 8.6 MG/DL (ref 8.3–10.6)
CHLORIDE BLD-SCNC: 102 MMOL/L (ref 99–110)
CO2: 22 MMOL/L (ref 21–32)
CREAT SERPL-MCNC: 0.6 MG/DL (ref 0.6–1.1)
CULTURE, BLOOD 2: NORMAL
EOSINOPHILS ABSOLUTE: 0.4 K/UL (ref 0–0.6)
EOSINOPHILS RELATIVE PERCENT: 5.8 %
GFR AFRICAN AMERICAN: >60
GFR NON-AFRICAN AMERICAN: >60
GLUCOSE BLD-MCNC: 90 MG/DL (ref 70–99)
HCT VFR BLD CALC: 26.9 % (ref 36–48)
HEMOGLOBIN: 9.2 G/DL (ref 12–16)
LYMPHOCYTES ABSOLUTE: 1.1 K/UL (ref 1–5.1)
LYMPHOCYTES RELATIVE PERCENT: 15.4 %
MAGNESIUM: 2 MG/DL (ref 1.8–2.4)
MCH RBC QN AUTO: 32 PG (ref 26–34)
MCHC RBC AUTO-ENTMCNC: 34.2 G/DL (ref 31–36)
MCV RBC AUTO: 93.5 FL (ref 80–100)
MONOCYTES ABSOLUTE: 1.2 K/UL (ref 0–1.3)
MONOCYTES RELATIVE PERCENT: 16.2 %
NEUTROPHILS ABSOLUTE: 4.4 K/UL (ref 1.7–7.7)
NEUTROPHILS RELATIVE PERCENT: 61.7 %
PDW BLD-RTO: 14 % (ref 12.4–15.4)
PLATELET # BLD: 381 K/UL (ref 135–450)
PMV BLD AUTO: 7.5 FL (ref 5–10.5)
POTASSIUM SERPL-SCNC: 3.9 MMOL/L (ref 3.5–5.1)
RBC # BLD: 2.88 M/UL (ref 4–5.2)
SODIUM BLD-SCNC: 137 MMOL/L (ref 136–145)
TOTAL PROTEIN: 6.2 G/DL (ref 6.4–8.2)
WBC # BLD: 7.2 K/UL (ref 4–11)

## 2022-04-29 PROCEDURE — 85025 COMPLETE CBC W/AUTO DIFF WBC: CPT

## 2022-04-29 PROCEDURE — 6370000000 HC RX 637 (ALT 250 FOR IP): Performed by: STUDENT IN AN ORGANIZED HEALTH CARE EDUCATION/TRAINING PROGRAM

## 2022-04-29 PROCEDURE — 83735 ASSAY OF MAGNESIUM: CPT

## 2022-04-29 PROCEDURE — 6370000000 HC RX 637 (ALT 250 FOR IP): Performed by: NURSE PRACTITIONER

## 2022-04-29 PROCEDURE — 99024 POSTOP FOLLOW-UP VISIT: CPT | Performed by: SURGERY

## 2022-04-29 PROCEDURE — 80053 COMPREHEN METABOLIC PANEL: CPT

## 2022-04-29 PROCEDURE — 2580000003 HC RX 258: Performed by: STUDENT IN AN ORGANIZED HEALTH CARE EDUCATION/TRAINING PROGRAM

## 2022-04-29 PROCEDURE — 94760 N-INVAS EAR/PLS OXIMETRY 1: CPT

## 2022-04-29 PROCEDURE — 6360000002 HC RX W HCPCS: Performed by: STUDENT IN AN ORGANIZED HEALTH CARE EDUCATION/TRAINING PROGRAM

## 2022-04-29 PROCEDURE — 99233 SBSQ HOSP IP/OBS HIGH 50: CPT | Performed by: INTERNAL MEDICINE

## 2022-04-29 PROCEDURE — 2500000003 HC RX 250 WO HCPCS: Performed by: STUDENT IN AN ORGANIZED HEALTH CARE EDUCATION/TRAINING PROGRAM

## 2022-04-29 PROCEDURE — APPNB30 APP NON BILLABLE TIME 0-30 MINS: Performed by: NURSE PRACTITIONER

## 2022-04-29 RX ORDER — POLYETHYLENE GLYCOL 3350 17 G/17G
17 POWDER, FOR SOLUTION ORAL DAILY
Status: DISCONTINUED | OUTPATIENT
Start: 2022-04-29 | End: 2022-04-29 | Stop reason: HOSPADM

## 2022-04-29 RX ORDER — LEVOFLOXACIN 500 MG/1
500 TABLET, FILM COATED ORAL DAILY
Qty: 5 TABLET | Refills: 0 | Status: SHIPPED | OUTPATIENT
Start: 2022-04-29

## 2022-04-29 RX ORDER — PROCHLORPERAZINE MALEATE 10 MG
10 TABLET ORAL EVERY 6 HOURS PRN
Qty: 120 TABLET | Refills: 3 | Status: SHIPPED | OUTPATIENT
Start: 2022-04-29

## 2022-04-29 RX ORDER — DOCUSATE SODIUM 100 MG/1
100 CAPSULE, LIQUID FILLED ORAL 2 TIMES DAILY
Status: DISCONTINUED | OUTPATIENT
Start: 2022-04-29 | End: 2022-04-29 | Stop reason: HOSPADM

## 2022-04-29 RX ADMIN — PANTOPRAZOLE SODIUM 40 MG: 40 TABLET, DELAYED RELEASE ORAL at 08:22

## 2022-04-29 RX ADMIN — METRONIDAZOLE 500 MG: 500 INJECTION, SOLUTION INTRAVENOUS at 02:27

## 2022-04-29 RX ADMIN — POLYETHYLENE GLYCOL 3350 17 G: 17 POWDER, FOR SOLUTION ORAL at 09:47

## 2022-04-29 RX ADMIN — METRONIDAZOLE 500 MG: 500 INJECTION, SOLUTION INTRAVENOUS at 08:24

## 2022-04-29 RX ADMIN — ACETAMINOPHEN 650 MG: 325 TABLET ORAL at 01:55

## 2022-04-29 RX ADMIN — SODIUM CHLORIDE, PRESERVATIVE FREE 10 ML: 5 INJECTION INTRAVENOUS at 08:21

## 2022-04-29 RX ADMIN — ONDANSETRON 4 MG: 2 INJECTION INTRAMUSCULAR; INTRAVENOUS at 05:09

## 2022-04-29 RX ADMIN — CEFEPIME HYDROCHLORIDE 2000 MG: 2 INJECTION, POWDER, FOR SOLUTION INTRAVENOUS at 01:55

## 2022-04-29 RX ADMIN — DOCUSATE SODIUM 100 MG: 100 CAPSULE, LIQUID FILLED ORAL at 09:47

## 2022-04-29 RX ADMIN — ENOXAPARIN SODIUM 40 MG: 100 INJECTION SUBCUTANEOUS at 08:20

## 2022-04-29 ASSESSMENT — PAIN - FUNCTIONAL ASSESSMENT: PAIN_FUNCTIONAL_ASSESSMENT: PREVENTS OR INTERFERES SOME ACTIVE ACTIVITIES AND ADLS

## 2022-04-29 ASSESSMENT — PAIN SCALES - GENERAL
PAINLEVEL_OUTOF10: 0
PAINLEVEL_OUTOF10: 0
PAINLEVEL_OUTOF10: 3

## 2022-04-29 ASSESSMENT — PAIN DESCRIPTION - ORIENTATION: ORIENTATION: LEFT;RIGHT;MID

## 2022-04-29 ASSESSMENT — PAIN DESCRIPTION - DESCRIPTORS: DESCRIPTORS: CRAMPING

## 2022-04-29 ASSESSMENT — PAIN DESCRIPTION - FREQUENCY: FREQUENCY: CONTINUOUS

## 2022-04-29 ASSESSMENT — PAIN DESCRIPTION - LOCATION: LOCATION: ABDOMEN

## 2022-04-29 ASSESSMENT — PAIN DESCRIPTION - PAIN TYPE: TYPE: ACUTE PAIN

## 2022-04-29 ASSESSMENT — PAIN DESCRIPTION - ONSET: ONSET: ON-GOING

## 2022-04-29 NOTE — PROGRESS NOTES
Patient discharged home w/ home care. Patient requesting shower chair at d/c. No order in place at this time. Message sent to MD Alexandro Marcano requesting DME order be placed. No response. Update provided to patient. Patient states she would like to leave at this time w/o shower chair. Patient educated that she can obtain a shower chair from a second hand medical store or could try [employer] to find a product. Patient wheeled to the lobby by family - staff assistance offered but refused. Tele monitor removed and L chest port de-accessed prior to departure from the unit.

## 2022-04-29 NOTE — CARE COORDINATION
DISCHARGE SUMMARY     DATE OF DISCHARGE: 4/29/2022    DISCHARGE DESTINATION: home      HOME CARE AGENCY:   Discharging to Facility/ Agency   · Name: [facility]  · Address:   [address] [place], [place] [address]  · Phone:  [phone]  · Fax:  [phone]    DME ORDERED: shower chair with back    TRANSPORTATION: Daughter will transport patient home    COMMENTS: Spoke with patient regarding discharge. She plans to return home and resume [facility]. She is requesting a shower chair with a back. Her daughter will transport her home. Spoke with Varsha at [facility] will pull orders off of epic. Sent message to attending MD requesting home care and shower chair orders and signed SHARMAINE. Rn aware. Electronically signed by Charmaine Opitz, MSW, OLIVIER, Case Management on 4/29/2022 at 12:40 PM  [facility] [id number]    2:31 PM  Per bedside Rn, patient decided not to wait for the shower chair order/delivery. Electronically signed by Charmaine Opitz, MSW, LISW, Case Management on 4/29/2022 at 2:32 PM  Alex [id number]      2:20 PM  Sent message to attending requesting a shower chair with back and home care orders and signed SHARMAINE.     Electronically signed by Charmaine Opitz, MSW, LISW, Case Management on 4/29/2022 at 2:21 PM  Alex [id number]

## 2022-04-29 NOTE — PROGRESS NOTES
Infectious Disease Follow up Notes  Admit Date: 4/24/2022  Hospital Day: 6    Antibiotics :   IV Cefepime  IV Flagyl     CHIEF COMPLAINT:     Abd pain   WBC elevation  Sepsis  Fevers  Colon cancer    Subjective interval History :  36 y.o. woman with a past medical history of stage IV colon cancer recent diagnosis of metastatic disease to liver. She is awaiting chemotherapy. She has a chest port in place. She underwent laparoscopic transverse colectomy with colostomy by Dr. Jane Lino on 4/13/22, she was discharged home recently now admitted with abdominal pain fever chills nausea. Her pain is mostly on the right upper quadrant radiating to the right shoulder. Admit labs indicate WBC elevated 14.1 hemoglobin 9.4 platelets 786 mild elevation in AST. Blood cultures from admission 4/24/22 negative. T-max 101.5 admission. CT chest negative. CT abdomen pelvis with postsurgical changes with colectomy and left-sided colostomy with widespread hepatic metastasis.   Location : Rt upper Quadrant pain      Quality :aching         Severity : 10/10    Duration :  4 days     Timing : constant   Context : Hepatic mets     Modifying factors : none  Associated signs and symptoms: Fevers, chills        Interval History : Rt side pain going down able to take oral diet and fever  Better and ostomy working ok chest port working well     Past Medical History:    Past Medical History:   Diagnosis Date    Asthma     Colon cancer [facility])        Past Surgical History:    Past Surgical History:   Procedure Laterality Date    COLONOSCOPY N/A 4/11/2022    COLONOSCOPY WITH BIOPSY performed by Jr Kennedy MD at [address]  4/11/2022    COLONOSCOPY SUBMUCOSAL tattoo  INJECTION performed by Jr Kennedy MD at [address] [facility] 4/13/2022    LAPAROSCOPIC TRANSVERSE COLECTOMY WITH COLOSTOMY, INSERTION OF PORT A CATHETER, LAPAROSCOPIC LIVER BIOPSY [facility]       Current Medications:    Outpatient Medications Marked as Taking for the 4/24/22 encounter [facility] Encounter)   Medication Sig Dispense Refill    prochlorperazine (COMPAZINE) 10 MG tablet Take 1 tablet by mouth every 6 hours as needed (nausea) 120 tablet 3       Allergies:  Codeine and Pcn [penicillins]    Immunizations :   Immunization History   Administered Date(s) Administered    COVID-19, ColoraderdamÂ® top, DO NOT Dilute, Romeo-Sucrose, 12+ yrs, PF, 30 mcg/0.3 mL dose 04/12/2022       Social History:    Social History     Tobacco Use    Smoking status: Never Smoker    Smokeless tobacco: Never Used   Substance Use Topics    Alcohol use: Yes     Comment: socially    Drug use: No     Social History     Tobacco Use   Smoking Status Never Smoker   Smokeless Tobacco Never Used      Family History : Aunt with colon cancer       REVIEW OF SYSTEMS:      Constitutional:  negative for fevers, chills, night sweats  Eyes:  negative for blurred vision, eye discharge, visual disturbance   HEENT:  negative for hearing loss, ear drainage,nasal congestion  Respiratory:  negative for cough, shortness of breath or hemoptysis   Cardiovascular:  negative for chest pain, palpitations, syncope  Gastrointestinal:  negative for nausea, vomiting, diarrhea, constipation, abdominal pain ++  Genitourinary:  negative for frequency, dysuria, urinary incontinence, hematuria  Hematologic/Lymphatic:  negative for easy bruising, bleeding and lymphadenopathy  Allergic/Immunologic:  negative for recurrent infections, angioedema, anaphylaxis   Endocrine:  negative for weight changes, polyuria, polydipsia and polyphagia  Musculoskeletal:  negative for joint  pain, swelling, decreased range of motion  Integumentary: No rashes, skin lesions  Neurological:  negative for headaches, slurred speech, unilateral weakness  Psychiatric: negative for hallucinations,confusion,agitation.                 PHYSICAL EXAM:      Vitals:  T max  101.5  /71   Pulse 74   Temp 98.4 °F (36.9 °C) (Oral)   Resp 18   Ht 5' 6\" (1.676 m)   Wt 212 lb 4.9 oz (96.3 kg)   LMP 04/10/2022   SpO2 98%   BMI 34.27 kg/m²     General Appearance: alert,in some acute distress, ++  pallor, no icterus   Skin: warm and dry, no rash or erythema  Head: normocephalic and atraumatic  Eyes: pupils equal, round, and reactive to light, conjunctivae normal  ENT: tympanic membrane, external ear and ear canal normal bilaterally, nose without deformity, nasal mucosa and turbinates normal without polyps  Neck: supple and non-tender without mass, no thyromegaly  no cervical lymphadenopathy  Pulmonary/Chest: clear to auscultation bilaterally- no wheezes, rales or rhonchi, normal air movement, no respiratory distress  Cardiovascular: n  S1 and S2, no murmurs, rubs, clicks, or gallops, no carotid bruits  Abdomen: soft, ++ tender, non-distended, normal bowel sounds, no masses or organomegaly  Ostomy++  Rt side pain+   Extremities: no cyanosis, clubbing or edema  Musculoskeletal: normal range of motion, no joint swelling, deformity or tenderness  Integumentary: No rashes, no abnormal skin lesions, no petechiae  Neurologic: reflexes normal and symmetric, no cranial nerve deficit  Psych:  Orientation, sensorium, mood normal            Lines: Chest  Port in place+      Data Review:    CBC:   Lab Results   Component Value Date    WBC 7.2 04/29/2022    HGB 9.2 (L) 04/29/2022    HCT 26.9 (L) 04/29/2022    MCV 93.5 04/29/2022     04/29/2022     RENAL:   Lab Results   Component Value Date    CREATININE 0.6 04/29/2022    BUN 10 04/29/2022     04/29/2022    K 3.9 04/29/2022     04/29/2022    CO2 22 04/29/2022     SED RATE: No results found for: SEDRATE  CK: No results found for: CKTOTAL  CRP: No results found for: CRP  Hepatic Function Panel:   Lab Results   Component Value Date    [facility] 142 04/29/2022    ALT 17 04/29/2022    AST 43 04/29/2022    PROT 6.2 04/29/2022    BILITOT <0.2 04/29/2022    LABALBU 2.9 04/29/2022     UA:  Lab Results   Component Value Date    COLORU Yellow 04/25/2022    CLARITYU Clear 04/25/2022    GLUCOSEU Negative 04/25/2022    BILIRUBINUR Negative 04/25/2022    KETUA Negative 04/25/2022    SPECGRAV 1.015 04/25/2022    BLOODU TRACE-INTACT 04/25/2022    PHUR 7.0 04/25/2022    PROTEINU Negative 04/25/2022    UROBILINOGEN 0.2 04/25/2022    NITRU Negative 04/25/2022    LEUKOCYTESUR Negative 04/25/2022    LABMICR YES 04/25/2022    URINETYPE NotGiven 04/25/2022      Urine Microscopic:   Lab Results   Component Value Date    BACTERIA 2+ 04/09/2022    COMU see below 04/21/2022    HYALCAST 1 04/25/2022    WBCUA 1 04/25/2022    RBCUA 2 04/25/2022    EPIU 6 04/25/2022     Urine Reflex to Culture:   Lab Results   Component Value Date    URRFLXCULT Not Indicated 04/25/2022         MICRO: cultures reviewed and updated by me   Blood Culture:   Lab Results   Component Value Date    BC No Growth after 4 days of incubation. 04/24/2022    BLOODCULT2 No Growth after 4 days of incubation. 04/24/2022       Respiratory Culture:  No results found for: Evelyne Gao  AFB:No results found for: [facility]  Viral Culture:  Lab Results   Component Value Date    COVID19 Not Detected 04/09/2022     Urine Culture: No results for input(s): Thomas Salamanca in the last 72 hours. IMAGING:    CT CHEST WO CONTRAST   Final Result   1. No definite findings of metastatic disease in the chest.  Of note, nodular   opacity seen in the basilar left lower lobe on 04/21/2022 could persist, but   passive atelectasis is present in this area and may obscured it if present. 2. No significant change in incompletely evaluated hepatic metastatic disease. 3. Minimal interstitial pulmonary edema, trace bilateral pleural effusions,   and trace pericardial effusion suggest volume overload.   Consider congestive   heart failure given mild cardiomegaly. 4. Mild bronchial wall thickening potentially due to pulmonary vascular   congestion, reactive airways disease, or bronchitis. XR CHEST PORTABLE   Preliminary Result   1. No acute cardiopulmonary disease. 2. No radiographic evidence of an acute rib fracture. CT ABDOMEN PELVIS W IV CONTRAST Additional Contrast? Oral   Final Result   No acute findings noted      Postsurgical change from partial colectomy left-sided colostomy again seen. .   Trace free air remains in the midline, similar to prior. Widespread hepatic metastasis. Retroperitoneal denisha metastasis appear   similar. Small amount of free fluid is seen within the pelvis, also similar      RECOMMENDATIONS:   Unavailable         XR CHEST PORTABLE   Final Result   No acute process. CT abd/pelvis  4/9/22     : Multiple hepatic lesions most consistent with metastatic disease. The largest lesion near the diaphragmatic surface of the liver measures 3.9 x   5.6 cm.  Gallbladder is partially contracted.  No biliary dilatation. Spleen, pancreas and adrenal glands are unremarkable.  No evidence of   obstructive uropathy.       GI/Bowel: There is suggestion of an annular mass in the distal transverse   colon concerning for underlying malignancy.  No evidence of obstruction or   significant inflammatory changes.  Scattered colonic diverticula with no   acute features.  No evidence of appendicitis.  No small bowel distension.    Stomach and duodenal sweep are unremarkable.  Small hiatal hernia.       Pelvis: Trace free pelvic fluid, likely physiologic.  The uterus and adnexal   structures are unremarkable.  Multiple calcified pelvic phleboliths.  The   bladder is contracted.       Peritoneum/Retroperitoneum: The abdominal aorta is normal in caliber.  No   pathologic retroperitoneal adenopathy or upper abdominal ascites.  Mesenteric   adenopathy adjacent to the lesion in the transverse colon with the largest node measuring 11 x 12 mm.       Bones/Soft Tissues: No acute osseous or soft tissue abnormality.  Small fat   containing umbilical hernia.           Impression   1. Multiple hepatic lesions most consistent with metastatic disease. Findings concerning for an annular mass in the distal transverse colon,   likely a colonic primary with adjacent adenopathy.    2. No other acute findings within the abdomen or pelvis.              All the pertinent images and reports for the current Hospitalization were reviewed by me     Scheduled Meds:   docusate sodium  100 mg Oral BID    polyethylene glycol  17 g Oral Daily    pantoprazole  40 mg Oral QAM AC    sodium chloride flush  5-40 mL IntraVENous 2 times per day    enoxaparin  40 mg SubCUTAneous Daily    metroNIDAZOLE  500 mg IntraVENous Q8H    cefepime  2,000 mg IntraVENous Q12H       Continuous Infusions:   sodium chloride 100 mL/hr at 04/29/22 0154       PRN Meds:  ibuprofen, prochlorperazine, dicyclomine, sodium chloride flush, sodium chloride, acetaminophen **OR** acetaminophen, ondansetron, HYDROmorphone **OR** HYDROmorphone, iohexol      Assessment:     Patient Active Problem List   Diagnosis    Lower GI bleed    Trichimoniasis    Liver lesion    Colonic mass    Rectal bleeding    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 (HCC)    Abdominal pain in female    [facility]    Sepsis ([facility]        Sepsis resolved  Fevers  Better   WBC elevation  Resolved   Wide spread Hepatic Mets  LFT elevation   Recent Diagnosis of Colon cancer with Mets  S/p Lap colectomy and colostomy on 4/13/22  CT abd/pelvis with wide spread mets to the liver and adenopathy   Rt side pain likely from Hepatic mets and LIver capsule enlargement   S/p surgery :  4/13/22  distal transverse colon cancer, multiple nodules of the liver consistent with metastatic disease  CT chest negative for PE or Mets     She is about to start chemo and hopefully fevers resolve and finish abx therapy to be ready for chemotherapy      WBC trend down fevers resolved and ok for d/c on oral abx and follow up Hem/Onc for chemotherapy soon       Labs, Microbiology, Radiology and all the pertinent results from current hospitalization and  care every where were reviewed  by me as a part of the evaluation   Plan:   1. Cont IV Cefepime x 2 gm Q 12 hrs as in patient only   2. Cont IV Flagyl as in patient   3. WBC trend down   4. bLOOD Cx   NGTD  5. Chest port working well  6. Able to choose oral abx at d/c Levofloxacin x 500 mg once a day for x  5  Days   7. PCN allergy noted  8. She did not tolerate oral Flagyl due to nausea  9. D/C PLANS per primary - ok for dc and follow up with           Discussed with patient/Family and Nursing   Risk of Complications/Morbidity: High      · Illness(es)/ Infection present that pose threat to bodily function. · There is potential for severe exacerbation of infection/side effects of treatment. · Therapy requires intensive monitoring for antimicrobial agent toxicity. Discussed with patient/Family and Nursing staff     Thanks for allowing me to participate in your patient's care and please call me with any questions or concerns.     Tim Stewart MD  Infectious Disease  [facility]) Physician  Phone: [phone]   Fax : [phone]

## 2022-04-29 NOTE — PROGRESS NOTES
While this RN was drawing morning labs patient started to complain of increased abdominal pain. Patient started dry heaving and had approximately 100 mL of clear, yellow emesis. PRN nausea medication given, see MAR. Provided patient with additional emesis bags, HOB remains elevated, patient able to make needs known using call light appropriately. Patient denies further needs at this time, call light within reach.

## 2022-04-29 NOTE — PLAN OF CARE
Problem: Discharge Planning  Goal: Discharge to home or other facility with appropriate resources  4/29/2022 0745 by Roseline Delacruz RN  Outcome: Progressing  Flowsheets (Taken 4/29/2022 0745)  Discharge to home or other facility with appropriate resources: Identify barriers to discharge with patient and caregiver  Note: Will update patient w/ discharge information as it becomes available   4/29/2022 0007 by Toma Dominguez RN  Outcome: Progressing  Note: Assessed patients knowledge of discharge. Will continue to work with patient on discharge planning and answer patient questions. Will consult case management and  as necessary. Problem: Skin/Tissue Integrity  Goal: Absence of new skin breakdown  Description: 1. Monitor for areas of redness and/or skin breakdown  2. Assess vascular access sites hourly  3. Every 4-6 hours minimum:  Change oxygen saturation probe site  4. Every 4-6 hours:  If on nasal continuous positive airway pressure, respiratory therapy assess nares and determine need for appliance change or resting period. 4/29/2022 0745 by Roseline Delacruz RN  Outcome: Progressing  Note: Patient skin condition and mucus membrane integrity remain unchanged during this shift. Skin breakdown prevention interventions are in place. Will continue to monitor and assess. 4/29/2022 0007 by Toma Dominguez RN  Outcome: Progressing  Note: Will monitor skin and mucous members. Will turn patient every 2 hours, monitor for friction and sheering, and change dressings as needed. Will preform skin assessment every shift.          Problem: Safety - Adult  Goal: Free from fall injury  4/29/2022 0745 by Roseline Delacruz RN  Outcome: Progressing  Flowsheets (Taken 4/28/2022 1711 by Carrie Solorzano RN)  Free From Fall Injury:   Instruct family/caregiver on patient safety   Based on caregiver fall risk screen, instruct family/caregiver to ask for assistance with transferring infant if caregiver noted to have fall risk factors  Note: Patient remains free from falls during this shift. Fall precautions remain in place. Patient educated on the need to implement call light use prior to ambulation. Will continue to monitor and assess. 4/29/2022 0007 by Danilo Covarrubias RN  Outcome: Progressing  Note: Patient educated on fall prevention. Call light is within reach, bed locked in lowest position, personal items within reach, and bed alarm is on. Will round on patient per unit guidelines. Problem: ABCDS Injury Assessment  Goal: Absence of physical injury  4/29/2022 0745 by Jennifer Zhang RN  Outcome: Progressing  Note: Patient remains free from physical harm during this shift. Will continue to monitor and assess patient. 4/29/2022 0007 by Danilo Covarrubias RN  Outcome: Progressing  Note: Pt assessed for fall risk and fall precautions put into place. Bed in lowest position and wheels locked, call light within reach. Nonskid footwear in place. Patient educated on appropriate method of transfer and to call for assistance. Problem: Pain  Goal: Verbalizes/displays adequate comfort level or baseline comfort level  4/29/2022 0745 by Jennifer Zhang RN  Outcome: Progressing  4/29/2022 0007 by Danilo Covarrubias RN  Outcome: Progressing  Note: Educated patient on pain management. Will assess patients pain level per unit protocol, and provide pain management measures as needed. Problem: Gastrointestinal - Adult  Goal: Minimal or absence of nausea and vomiting  Outcome: Progressing  Note: Patient w/ one episode of nausea/vomiting this shift. PRNs administered by night shift RN w/ effect.  Will continue to monitor and assess this shift   Goal: Maintains or returns to baseline bowel function  Outcome: Progressing  Note: No output from colostomy overnight per night shift RN, will monitor output throughout this shift   Goal: Establish and maintain optimal ostomy function  Outcome: Progressing  Note: No colostomy output noted overnight, will monitor this shift and document output      Problem: Infection - Adult  Goal: Absence of infection at discharge  Outcome: Progressing  Flowsheets (Taken 4/29/2022 8091)  Absence of infection at discharge:   Assess and monitor for signs and symptoms of infection   Monitor lab/diagnostic results  Note: Patient remains free from new signs and symptoms of infection during this shift. Infection prevention measures are in place. Will continue to monitor for alterations in patient condition throughout the shift.

## 2022-04-29 NOTE — PROGRESS NOTES
Patient w/ 25 mL of soft, formed stool emptied from ostomy after administration of colace and glycolax.

## 2022-04-29 NOTE — PROGRESS NOTES
[facility] General Surgery                                   Daily Progress Note                                                         Pt Name: Cameron Wild  Medical Record Number: [id number]  Date of Birth 1980   Today's Date: 4/29/2022  CC: nausea, weakness    ASSESSMENT/PLAN  35 yo female with metastatic colon CA s/p lap transverse colectomy with colostomy on 4/13, readmitted with with nausea, vomiting, LUQ pain   -doing better. Tolerating diet, ostomy working. Resume bowel regimen  -OK to DC from surgery standpoint. Follow up with me as needed. Compazine sent to pharmacy. abx per ID.           Appreciate ostomy nurse assistance     Ambulate/OOB        SUBJECTIVE  Hedy Marilyn is doing OK today. Minimal nausea. No significant pain. Wants to go home. OBJECTIVE  VITALS:  height is 5' 6\" (1.676 m) and weight is 212 lb 4.9 oz (96.3 kg). Her oral temperature is 98.4 °F (36.9 °C). Her blood pressure is 118/71 and her pulse is 74. Her respiration is 18 and oxygen saturation is 98%. INTAKE/OUTPUT:      Intake/Output Summary (Last 24 hours) at 4/29/2022 0905  Last data filed at 4/29/2022 8478  Gross per 24 hour   Intake 730 ml   Output 125 ml   Net 605 ml     GENERAL: alert, cooperative, no distress  LUNGS: normal respiratory effort, no accessory muscle use  HEART: normal rate and regular rhythm  ABDOMEN: Soft, non tender, non distended. Ostomy stool and gas in appliance. Stoma pink. EXTREMITY: no cyanosis, clubbing or edema       I/O last 3 completed shifts:   In: 12 [P.O.:960]  Out: 140 [Emesis/NG output:100; Stool:40]      LABS  Recent Labs     04/29/22  0505   WBC 7.2   HGB 9.2*   HCT 26.9*         K 3.9      CO2 22   BUN 10   CREATININE 0.6   MG 2.00   CALCIUM 8.6   AST 43*   ALT 17   BILITOT <0.2       EDUCATION  Patient educated about Disease Process, Medications, Smoking Cessation, Oxygenation, Incentive Spirometry and Deep Breath and Cough, Diabetes, Hyperlipidemia, Smoking Cessation, Nutrition, Exercise and Hypertension    Electronically signed by Susanne Kirkland MD on 4/29/2022 at [address] [place] [address] and Vascular Surgery   [phone] Office  [phone] Fax

## 2022-04-29 NOTE — PROGRESS NOTES
Data- discharge order received, pt verbalized agreement to discharge, disposition to previous residence, no needs for HHC/DME. Action- discharge instructions prepared and given to patient, pt verbalized understanding. Medication information packet given r/t NEW and/or CHANGED prescriptions emphasizing name/purpose/side effects, pt verbalized understanding. Discharge instruction summary: Diet- as tolerated, Activity- as tolerated, Primary Care Physician as follows: No primary care provider on file. None f/u appointment to be scheduled by patient, immunizations reviewed, prescription medications filled at [address] [place] [address] per pt request.     Response- Pt belongings gathered, IV removed. Disposition is home (no HHC/DME needs), transported with family, taken to lobby via w/c w/ family - staff assistance offered but refused, no complications.

## 2022-04-29 NOTE — PLAN OF CARE
Problem: Discharge Planning  Goal: Discharge to home or other facility with appropriate resources  4/29/2022 0007 by Abner Huerta RN  Outcome: Progressing  Note: Assessed patients knowledge of discharge. Will continue to work with patient on discharge planning and answer patient questions. Will consult case management and  as necessary. 4/28/2022 1711 by Ted Mahmood RN  Outcome: Progressing     Problem: Skin/Tissue Integrity  Goal: Absence of new skin breakdown  Description: 1. Monitor for areas of redness and/or skin breakdown  2. Assess vascular access sites hourly  3. Every 4-6 hours minimum:  Change oxygen saturation probe site  4. Every 4-6 hours:  If on nasal continuous positive airway pressure, respiratory therapy assess nares and determine need for appliance change or resting period. 4/29/2022 0007 by Abner Huerta RN  Outcome: Progressing  Note: Will monitor skin and mucous members. Will turn patient every 2 hours, monitor for friction and sheering, and change dressings as needed. Will preform skin assessment every shift. 4/28/2022 1711 by Ted Mahmood RN  Outcome: Progressing  Note: Patient skin will be free from wrinkles, by encourage repositioned every 2 hours to avoid new skin breakdown. Problem: Safety - Adult  Goal: Free from fall injury  4/29/2022 0007 by Abner Huerta RN  Outcome: Progressing  Note: Patient educated on fall prevention. Call light is within reach, bed locked in lowest position, personal items within reach, and bed alarm is on. Will round on patient per unit guidelines.     4/28/2022 1711 by Ted Mahmood RN  Outcome: Progressing  Flowsheets (Taken 4/28/2022 1711)  Free From Fall Injury:   Instruct family/caregiver on patient safety   Based on caregiver fall risk screen, instruct family/caregiver to ask for assistance with transferring infant if caregiver noted to have fall risk factors     Problem: ABCDS Injury Assessment  Goal: Absence of physical injury  4/29/2022 0007 by Brayan Wadsworth RN  Outcome: Progressing  Note: Pt assessed for fall risk and fall precautions put into place. Bed in lowest position and wheels locked, call light within reach. Nonskid footwear in place. Patient educated on appropriate method of transfer and to call for assistance. 4/28/2022 1711 by Allen Parikh RN  Outcome: Progressing  Note: Patient been educate of the use of call light during my shift. Problem: Pain  Goal: Verbalizes/displays adequate comfort level or baseline comfort level  4/29/2022 0007 by Brayan Wadsworth RN  Outcome: Progressing  Note: Educated patient on pain management. Will assess patients pain level per unit protocol, and provide pain management measures as needed.       4/28/2022 1711 by Allen Parikh RN  Outcome: Progressing  Flowsheets (Taken 4/28/2022 1711)  Verbalizes/displays adequate comfort level or baseline comfort level:   Encourage patient to monitor pain and request assistance   Assess pain using appropriate pain scale   Implement non-pharmacological measures as appropriate and evaluate response

## 2022-04-29 NOTE — PROGRESS NOTES
Patient resting in bed this morning w/o complaint, states that pain and nausea have improved since overnight. Patient w/ poor PO intake for breakfast. Patient tolerated medication administration w/o complication. See eMAR for documentation. Ostomy in place to LUQ. Ostomy pouch clean, dry, and intact - not due to be changed at this time as it was changed on 4/28 w/ wound care RN. No output noted from ostomy. Scheduled colace and glycolax administered to patient to increase output. Patient denies physical/emotional needs at this time. Call light, telephone, and bed side table are within reach. Fall precautions in place. Will continue to monitor and assess.

## 2022-05-01 NOTE — DISCHARGE SUMMARY
Hospital Medicine Discharge Summary      Patient ID: Yoli Thompson , [id number]     Patient's PCP: No primary care provider on file. Admit Date: 4/24/2022     Discharge Date: 4/29/2022      Admitting Physician: Cosmo Mg DO    Discharge Physician: Sadie Felix MD     Discharge Diagnoses: Active Hospital Problems    Diagnosis Date Noted    Malignancy ([address] [C80.1] 04/25/2022     Priority: Medium    Sepsis ([address] [A41.9] 04/25/2022     Priority: Medium    Abdominal pain in female [R10.9] 04/24/2022     Priority: Medium         The patient was seen and examined on the day of discharge and this discharge summary is in conjunction with any daily progress note from day of discharge. HOSPITAL COURSE    Patient demographics:  The patient  Yoli Thompson is a 36 y.o. female      Significant past medical history:       Patient Active Problem List   Diagnosis    Lower GI bleed    Trichimoniasis    Liver lesion    Colonic mass    Rectal bleeding    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 ([address]    Abdominal pain in female    Malignancy ([address]    Sepsis ([address]            Presenting symptoms:  Abdominal pain, nausea, vomiting, fever and chills        Diagnostic workup:   CT CHEST WO CONTRAST  CT ABDOMEN PELVIS W IV CONTRAST       CONSULTS DURING ADMISSION :   IP CONSULT TO GENERAL SURGERY  IP CONSULT TO HOSPITALIST  IP CONSULT TO GENERAL SURGERY  IP CONSULT TO INFECTIOUS DISEASES        Patient was diagnosed with:  Sepsis  Abdominal pain  Malignancy        Treatment while inpatient:  36years old female with medical history significant for recent diagnosis of stage IV colon cancer, patient is status post colectomy and end colostomy created on 4/13. Patient was discharged from the hospital more recently on the 19th. Patient again presented to the emergency room with 3 to 4 days of                   intractable nausea vomiting and abdominal pain.   Patient was also noted to have leukocytosis and fever and patient was diagnosed with sepsis without unclear etiology for her sepsis     Patient was treated with IV antibiotics with cefepime and Flagyl while in the hospital and patient will be discharged home on 5 days of Levaquin.        Discharge Condition:  stable      Discharged to:  Home      Activity:   as tolerated:     Follow Up: Follow-up with PCP in 1-2 weeks            Labs:  For convenience and continuity at follow-up the following most recent labs are provided:      CBC:   Lab Results   Component Value Date    WBC 7.2 04/29/2022    HGB 9.2 04/29/2022    HCT 26.9 04/29/2022     04/29/2022       RENAL:   Lab Results   Component Value Date     04/29/2022    K 3.9 04/29/2022    K 4.0 04/25/2022     04/29/2022    CO2 22 04/29/2022    BUN 10 04/29/2022    CREATININE 0.6 04/29/2022           Discharge Medications:      Medication List      START taking these medications    levoFLOXacin 500 MG tablet  Commonly known as: LEVAQUIN  Take 1 tablet by mouth daily     prochlorperazine 10 MG tablet  Commonly known as: COMPAZINE  Take 1 tablet by mouth every 6 hours as needed (nausea)        CONTINUE taking these medications    dicyclomine 10 MG capsule  Commonly known as: Bentyl  Take 2 capsules by mouth every 6 hours as needed (cramps)     docusate sodium 100 MG capsule  Commonly known as: COLACE  Take 1 capsule by mouth 2 times daily for 14 days     ondansetron 4 MG disintegrating tablet  Commonly known as: ZOFRAN-ODT  Take 1 tablet by mouth every 8 hours as needed for Nausea or Vomiting     pantoprazole 40 MG tablet  Commonly known as: PROTONIX  Take 1 tablet by mouth every morning (before breakfast)     polyethylene glycol 17 g packet  Commonly known as: GLYCOLAX  Take 17 g by mouth daily        STOP taking these medications    oxyCODONE 5 MG immediate release tablet  Commonly known as: Roxicodone     traMADol 50 MG tablet  Commonly known as: Ultram           Where to Get Your Medications      These medications were sent to [facility] [id number] [facility], [address] [place]    Phone: [phone]   · levoFLOXacin 500 MG tablet     These medications were sent to [facility] , [address] [place] [address] & formerly [facility]  [address] [address]    Phone: [phone]   · prochlorperazine 10 MG tablet            Time Spent on discharge is more than 30 min in the examination, evaluation, counseling and review of medications and discharge plan. Signed:  Javid Eubanks MD   5/1/2022      Thank you No primary care provider on file. for the opportunity to be involved in this patient's care. If you have any questions or concerns please feel free to contact me at [phone]. This note was transcribed using [id number] [email or web address]. Please disregard any translational errors.

## 2022-05-02 ENCOUNTER — CARE COORDINATION (OUTPATIENT)
Dept: CASE MANAGEMENT | Age: 42
End: 2022-05-02

## 2022-05-02 NOTE — CARE COORDINATION
Stephanie [facility] Transitions Initial Follow Up Call    Call within 2 business days of discharge: Yes    Patient: Jaimee Kaur Patient : 1980   MRN: [id number]  Reason for Admission: generalized abdominal pain  Discharge Date: 22 RARS: Readmission Risk Score: 18.8 ( )      Last Discharge [facility]       Complaint Diagnosis Description Type Department Provider    22 Abdominal Pain Generalized abdominal pain ED to Hosp-Admission (Discharged) (ADMITTED) ROMEL 3W Stephania Hayes MD; Nsehniit. .. Spoke with: Jaimee Kaur - patient    Facility: [facility]    Non-face-to-face services provided:  Obtained and reviewed discharge summary and/or continuity of care documents    Care Transitions 24 Hour Call    Do you have a copy of your discharge instructions?: Yes  Do you have all of your prescriptions and are they filled?: Yes  Have you been contacted by a [employer] [facility] [address]?: No  Have you scheduled your follow up appointment?: No  Do you feel like you have everything you need to keep you well at home?: No  Care Transitions Interventions       Initial attempt at CT discharge phone call. Jam Kessler states she is doing \"okay\". She states she has not heard from [facility]. She states she has not heard anything about the shower chair. Jam Ksesler states her colostomy is working \"fine\", but she feels she is a little constipated and this is causing her some abdominal pain. She states she is experiencing nausea and vomiting but is taking the medications as directed for the constipation, the nausea, and the vomiting. She denies any fever or chills. Call placed to [facility]. Left message. They state they have not received any resumption of care orders. Call placed to [facility] - left message. CTN will follow. Follow Up  No future appointments.     Niranjan Girard RN BSN  Care Transition Nurse  [phone]

## 2022-05-03 ENCOUNTER — CARE COORDINATION (OUTPATIENT)
Dept: CASE MANAGEMENT | Age: 42
End: 2022-05-03

## 2022-05-03 NOTE — CARE COORDINATION
Stephanie 45 Transitions Follow Up Call    5/3/2022    Patient: Cristiane Fung  Patient : 1980   MRN: [id number]  Reason for Admission: generalized abd pain  Discharge Date: 22 RARS: Readmission Risk Score: 18.8 ( )         Spoke with: Cristiane Fung - patient    Care Transitions Subsequent and Final Call    Subsequent and Final Calls  Care Transitions Interventions  Other Interventions:         Spoke briefly with Sekou Osullivan. She states she just spoke with Lolly Lesch from the Care Transition team and they had a 3 way call to [facility]. Writer discussed the need for a script for the shower chair. Informed Sekou Osullivan she can work with [facility] to contact her PCP or she can reach out to the PCP. Writer also informed Sekou Osullivan about looking at [address] for DME. She verbalized understanding. Informed Sekou Osullivan this would be the final CTN outreach. Encouraged Sekou Osullivan to reach out to Coral Frank or her PCP with any future medical issues or concerns. Follow Up  No future appointments.     Zain Fontanez RN BSN  Care Transition Nurse  [phone]

## 2022-05-03 NOTE — CARE COORDINATION
[facility] 45 Transitions Follow Up Call    5/3/2022    Patient: Lisa Peterson  Patient : 1980   MRN: <[id number]>  Reason for Admission: generalized abdominal pain  Discharge Date: 22 RARS: Readmission Risk Score: 18.8 ( )         Spoke with: Lisa Peterson who reports that she is doing a little better. Pateint states that she still some abdominal pain but no n/v/d. Patient states that she still have not heard from [facility]. Writer and patient did a 3 way call and spoke with Sukhwinder Small who confirmed that they do have patient's referral and someone will be getting in touch with her either today or tomorrow. Patient states that she had a HFU with Dr Todd Ross at the [facility]. Patient denies cp, sob, cough, dizziness, headache, n/v, diarrhea, abdominal pains, fever, or chills. Patient report that appetite and fluid intake is good and denies any problems with bladder or bowel(colostomy). Patient is taking all medications as ordered. Denies any needs at this time. Patient instructed to continue to monitor s/s, reporting any that may present to MD immediately for early intervention. Agreeable to f/u calls. Care Transitions Follow Up Call    Needs to be reviewed by the provider   Additional needs identified to be addressed with provider: No  none             Method of communication with provider : phone      Care Transition Nurse (CTN) contacted the patient by telephone to follow up after admission. Verified name and  with patient as identifiers. Addressed changes since last contact: none  Discussed follow-up appointments. If no appointment was previously scheduled, appointment scheduling offered: Yes. Is follow up appointment scheduled within 7 days of discharge? Yes. CTN reviewed discharge instructions, medical action plan and red flags with patient and discussed any barriers to care and/or understanding of plan of care after discharge.  Discussed appropriate site of care based on symptoms and resources available to patient including: PCP  Specialist  Home health  When to call 911. The patient agrees to contact the PCP office for questions related to their healthcare. Non-[facility] follow up appointment(s): joi REYESN provided contact information for future needs. Plan for follow-up call in 5-7 days based on severity of symptoms and risk factors. Plan for next call: symptom management-.         Care Transitions Subsequent and Final Call    Subsequent and Final Calls  Do you have any ongoing symptoms?: Yes  Onset of Patient-reported symptoms: Other  Patient-reported symptoms: Abdominal Pain  Interventions for patient-reported symptoms: Other  Have your medications changed?: No  Do you have any questions related to your medications?: No  Do you currently have any active services?: Yes  Are you currently active with any services?: Home Health  Do you have any needs or concerns that I can assist you with?: No  Care Transitions Interventions   Home Care Waiver: Completed     Other Interventions: Follow Up  No future appointments.     Vannesa Wilks LPN

## 2022-05-03 NOTE — CARE COORDINATION
5/3/2022  9:13 AM  Received call from Juan Paulino with Care Transitions. She reported following up with patient who stated home care has not contacted her as of yet and shower chair was not obtained. Explained that on day of discharge, patient was anxious to leave the hospital and did not want to wait for MD order for shower chair. Informed Juan Paulino that [place] [address] home care was contacted regarding discharge Friday and orders were in chart. Provided Juan Paulino with Clyde Danielle at [facility] phone number ([phone]). Called Clyde Danielle and requested he follow up with patient and re-start her home care services.     Electronically signed by LEONARD Zavala, OLIVIER, Case Management on 5/3/2022 at 9:13 AM  [facility] [phone]

## 2022-05-12 ENCOUNTER — APPOINTMENT (OUTPATIENT)
Dept: CT IMAGING | Age: 42
End: 2022-05-12
Payer: COMMERCIAL

## 2022-05-12 ENCOUNTER — HOSPITAL ENCOUNTER (EMERGENCY)
Age: 42
Discharge: HOME | End: 2022-05-12
Attending: EMERGENCY MEDICINE
Payer: COMMERCIAL

## 2022-05-12 VITALS
HEART RATE: 83 BPM | BODY MASS INDEX: 32.3 KG/M2 | OXYGEN SATURATION: 98 % | HEIGHT: 66 IN | WEIGHT: 201 LBS | RESPIRATION RATE: 25 BRPM | SYSTOLIC BLOOD PRESSURE: 120 MMHG | DIASTOLIC BLOOD PRESSURE: 80 MMHG | TEMPERATURE: 98.7 F

## 2022-05-12 DIAGNOSIS — R07.9 CHEST PAIN, UNSPECIFIED TYPE: Primary | ICD-10-CM

## 2022-05-12 LAB
ALBUMIN SERPL-MCNC: 3.4 G/DL (ref 3.4–5)
ALP BLD-CCNC: 217 U/L (ref 40–129)
ALT SERPL-CCNC: 36 U/L (ref 10–40)
ANION GAP SERPL CALCULATED.3IONS-SCNC: 14 MMOL/L (ref 3–16)
AST SERPL-CCNC: 88 U/L (ref 15–37)
BASOPHILS ABSOLUTE: 0 K/UL (ref 0–0.2)
BASOPHILS RELATIVE PERCENT: 0.4 %
BILIRUB SERPL-MCNC: 0.7 MG/DL (ref 0–1)
BILIRUBIN DIRECT: <0.2 MG/DL (ref 0–0.3)
BILIRUBIN, INDIRECT: ABNORMAL MG/DL (ref 0–1)
BUN BLDV-MCNC: 18 MG/DL (ref 7–20)
CALCIUM SERPL-MCNC: 10 MG/DL (ref 8.3–10.6)
CHLORIDE BLD-SCNC: 96 MMOL/L (ref 99–110)
CO2: 24 MMOL/L (ref 21–32)
CREAT SERPL-MCNC: 0.8 MG/DL (ref 0.6–1.1)
EKG ATRIAL RATE: 86 BPM
EKG DIAGNOSIS: NORMAL
EKG P AXIS: 58 DEGREES
EKG P-R INTERVAL: 154 MS
EKG Q-T INTERVAL: 380 MS
EKG QRS DURATION: 90 MS
EKG QTC CALCULATION (BAZETT): 454 MS
EKG R AXIS: 33 DEGREES
EKG T AXIS: 17 DEGREES
EKG VENTRICULAR RATE: 86 BPM
EOSINOPHILS ABSOLUTE: 0 K/UL (ref 0–0.6)
EOSINOPHILS RELATIVE PERCENT: 0 %
GFR AFRICAN AMERICAN: >60
GFR NON-AFRICAN AMERICAN: >60
GLUCOSE BLD-MCNC: 110 MG/DL (ref 70–99)
HCT VFR BLD CALC: 29.6 % (ref 36–48)
HEMOGLOBIN: 10 G/DL (ref 12–16)
LIPASE: 17 U/L (ref 13–60)
LYMPHOCYTES ABSOLUTE: 1.3 K/UL (ref 1–5.1)
LYMPHOCYTES RELATIVE PERCENT: 20 %
MCH RBC QN AUTO: 31.1 PG (ref 26–34)
MCHC RBC AUTO-ENTMCNC: 33.7 G/DL (ref 31–36)
MCV RBC AUTO: 92.3 FL (ref 80–100)
MONOCYTES ABSOLUTE: 0.4 K/UL (ref 0–1.3)
MONOCYTES RELATIVE PERCENT: 6.6 %
NEUTROPHILS ABSOLUTE: 4.8 K/UL (ref 1.7–7.7)
NEUTROPHILS RELATIVE PERCENT: 73 %
PDW BLD-RTO: 14.4 % (ref 12.4–15.4)
PLATELET # BLD: 363 K/UL (ref 135–450)
PMV BLD AUTO: 7.5 FL (ref 5–10.5)
POTASSIUM REFLEX MAGNESIUM: 4 MMOL/L (ref 3.5–5.1)
RBC # BLD: 3.2 M/UL (ref 4–5.2)
SODIUM BLD-SCNC: 134 MMOL/L (ref 136–145)
TOTAL PROTEIN: 8.3 G/DL (ref 6.4–8.2)
TROPONIN: <0.01 NG/ML
TROPONIN: <0.01 NG/ML
WBC # BLD: 6.5 K/UL (ref 4–11)

## 2022-05-12 PROCEDURE — 6370000000 HC RX 637 (ALT 250 FOR IP): Performed by: EMERGENCY MEDICINE

## 2022-05-12 PROCEDURE — 85025 COMPLETE CBC W/AUTO DIFF WBC: CPT

## 2022-05-12 PROCEDURE — 83690 ASSAY OF LIPASE: CPT

## 2022-05-12 PROCEDURE — 84484 ASSAY OF TROPONIN QUANT: CPT

## 2022-05-12 PROCEDURE — 80076 HEPATIC FUNCTION PANEL: CPT

## 2022-05-12 PROCEDURE — 6360000004 HC RX CONTRAST MEDICATION

## 2022-05-12 PROCEDURE — 93005 ELECTROCARDIOGRAM TRACING: CPT

## 2022-05-12 PROCEDURE — 71260 CT THORAX DX C+: CPT

## 2022-05-12 PROCEDURE — 99285 EMERGENCY DEPT VISIT HI MDM: CPT

## 2022-05-12 PROCEDURE — 80048 BASIC METABOLIC PNL TOTAL CA: CPT

## 2022-05-12 PROCEDURE — 93010 ELECTROCARDIOGRAM REPORT: CPT | Performed by: INTERNAL MEDICINE

## 2022-05-12 RX ORDER — ALUMINA, MAGNESIA, AND SIMETHICONE 2400; 2400; 240 MG/30ML; MG/30ML; MG/30ML
15 SUSPENSION ORAL EVERY 4 HOURS PRN
Qty: 360 ML | Refills: 0 | Status: SHIPPED | OUTPATIENT
Start: 2022-05-12

## 2022-05-12 RX ADMIN — IOPAMIDOL 75 ML: 755 INJECTION, SOLUTION INTRAVENOUS at 14:56

## 2022-05-12 RX ADMIN — ALUMINUM HYDROXIDE, MAGNESIUM HYDROXIDE, AND SIMETHICONE: 200; 200; 20 SUSPENSION ORAL at 16:31

## 2022-05-12 ASSESSMENT — PAIN - FUNCTIONAL ASSESSMENT: PAIN_FUNCTIONAL_ASSESSMENT: 0-10

## 2022-05-12 ASSESSMENT — ENCOUNTER SYMPTOMS
SHORTNESS OF BREATH: 1
CONSTIPATION: 0
BACK PAIN: 0
ABDOMINAL PAIN: 1
DIARRHEA: 0
RHINORRHEA: 0
EYE PAIN: 0
VOMITING: 0
NAUSEA: 1
SORE THROAT: 0
COUGH: 0

## 2022-05-12 ASSESSMENT — HEART SCORE: ECG: 0

## 2022-05-12 ASSESSMENT — PAIN SCALES - GENERAL
PAINLEVEL_OUTOF10: 4
PAINLEVEL_OUTOF10: 3

## 2022-05-12 ASSESSMENT — PAIN DESCRIPTION - DESCRIPTORS: DESCRIPTORS: THROBBING

## 2022-05-12 ASSESSMENT — PAIN DESCRIPTION - LOCATION: LOCATION: CHEST

## 2022-05-12 ASSESSMENT — PAIN DESCRIPTION - PAIN TYPE: TYPE: ACUTE PAIN

## 2022-05-12 ASSESSMENT — PAIN DESCRIPTION - FREQUENCY: FREQUENCY: INTERMITTENT

## 2022-05-12 NOTE — ED NOTES
Pt c/o worsening cp after GI cocktail given.  Pt stood up and HR went to 123, provider notified and ordered repeat EKG/trop      Chet Ibarra, RN  05/12/22 5612

## 2022-05-12 NOTE — ED PROVIDER NOTES
I independently performed a history and physical on [employer]. All diagnostic, treatment, and disposition decisions were made by myself in conjunction with the advanced practice provider. For further details of [facility] emergency department encounter, please see Khushboo Campos NP's documentation. Patient complains of abrupt onset of anterior chest pain around midnight. She states is worse when she lies down flat. She denies any fevers, sweats or chills. She does have cancer and recently started chemotherapy. She does have some nausea as well. Patient reports pain is constant from midnight until sometime in the mid morning today. Since then its been intermittent. On exam heart regular rate and rhythm and lungs clear to auscultation bilaterally. EKG  The Ekg interpreted by me shows  normal sinus rhythm with a rate of 86  Axis is   Normal  QTc is  normal  Intervals and Durations are unremarkable. ST Segments: no acute change  No significant change from prior EKG dated 15 apr 2022    REPEAT EKG  The Ekg interpreted by me shows  normal sinus rhythm with a rate of 84  Axis is   Normal  QTc is  normal  Intervals and Durations are unremarkable. ST Segments: no acute change  No significant change from prior EKG dated earlier today          CT CHEST PULMONARY EMBOLISM W CONTRAST  1. No evidence of pulmonary embolism or acute pulmonary abnormality. 2.  2 cm inferior right internal mammary lymph node consistent with a metastatic deposit.  3.  Heterogeneity of the liver parenchyma may correspond to previously demonstrated liver lesions, however these are not well delineated for comparison on this exam.     Results for orders placed or performed during the hospital encounter of 05/12/22   CBC with Auto Differential   Result Value Ref Range    WBC 6.5 4.0 - 11.0 K/uL    RBC 3.20 (L) 4.00 - 5.20 M/uL    Hemoglobin 10.0 (L) 12.0 - 16.0 g/dL    Hematocrit 29.6 (L) 36.0 - 48.0 %    MCV 92.3 80.0 - 100.0 fL    MCH 31.1 26.0 - 34.0 pg    MCHC 33.7 31.0 - 36.0 g/dL    RDW 14.4 12.4 - 15.4 %    Platelets 205 424 - 747 K/uL    MPV 7.5 5.0 - 10.5 fL    Neutrophils % 73.0 %    Lymphocytes % 20.0 %    Monocytes % 6.6 %    Eosinophils % 0.0 %    Basophils % 0.4 %    Neutrophils Absolute 4.8 1.7 - 7.7 K/uL    Lymphocytes Absolute 1.3 1.0 - 5.1 K/uL    Monocytes Absolute 0.4 0.0 - 1.3 K/uL    Eosinophils Absolute 0.0 0.0 - 0.6 K/uL    Basophils Absolute 0.0 0.0 - 0.2 K/uL   Basic Metabolic Panel w/ Reflex to MG   Result Value Ref Range    Sodium 134 (L) 136 - 145 mmol/L    Potassium reflex Magnesium 4.0 3.5 - 5.1 mmol/L    Chloride 96 (L) 99 - 110 mmol/L    CO2 24 21 - 32 mmol/L    Anion Gap 14 3 - 16    Glucose 110 (H) 70 - 99 mg/dL    BUN 18 7 - 20 mg/dL    CREATININE 0.8 0.6 - 1.1 mg/dL    GFR Non-African American >60 >60    GFR African American >60 >60    Calcium 10.0 8.3 - 10.6 mg/dL   Lipase   Result Value Ref Range    Lipase 17.0 13.0 - 60.0 U/L   Hepatic Function Panel   Result Value Ref Range    Total Protein 8.3 (H) 6.4 - 8.2 g/dL    Albumin 3.4 3.4 - 5.0 g/dL    Alkaline Phosphatase 217 (H) 40 - 129 U/L    ALT 36 10 - 40 U/L    AST 88 (H) 15 - 37 U/L    Total Bilirubin 0.7 0.0 - 1.0 mg/dL    Bilirubin, Direct <0.2 0.0 - 0.3 mg/dL    Bilirubin, Indirect see below 0.0 - 1.0 mg/dL   Troponin   Result Value Ref Range    Troponin <0.01 <0.01 ng/mL   Troponin   Result Value Ref Range    Troponin <0.01 <0.01 ng/mL   EKG 12 Lead   Result Value Ref Range    Ventricular Rate 86 BPM    Atrial Rate 86 BPM    P-R Interval 154 ms    QRS Duration 90 ms    Q-T Interval 380 ms    QTc Calculation (Bazett) 454 ms    P Axis 58 degrees    R Axis 33 degrees    T Axis 17 degrees    Diagnosis       Normal sinus rhythmNonspecific T wave abnormalityAbnormal ECGWhen compared with ECG of 15-APR-2022 07:23,No significant change was foundConfirmed by Linh, [address] [place] [address] ([id number]) on 5/12/2022 5:35:55 PM         I personally saw this patient and performed a substantive portion of the visit including all aspects of the medical decision making. MDM  Patient had improvement with GI cocktail and with a negative troponin and EKG and negative CT PE study I believe that she is safe for discharge. Advised return for new or worsening symptoms    I personally saw this patient and independently provided 10 minutes of non-concurrent critical care out of the total shared critical care time provided. I estimate there is LOW risk for PULMONARY EMBOLISM, ACUTE CORONARY SYNDROME, OR THORACIC AORTIC DISSECTION, thus I consider the discharge disposition reasonable. Ced Sexton and I have discussed the diagnosis and risks, and we agree with discharging home to follow-up with their primary doctor. We also discussed returning to the Emergency Department immediately if new or worsening symptoms occur. We have discussed the symptoms which are most concerning (e.g., bloody sputum, fever, worsening pain or shortness of breath, vomiting) that necessitate immediate return. FINAL Impression    1. Chest pain, unspecified type        Blood pressure 120/80, pulse 83, temperature 98.7 °F (37.1 °C), temperature source Oral, resp. rate 25, height 5' 6\" (1.676 m), weight 201 lb (91.2 kg), SpO2 98 %, not currently breastfeeding.        Evi Swan MD  05/12/22 2103       Evi Swan MD  05/12/22 2113

## 2022-05-12 NOTE — ED PROVIDER NOTES
[address] [facility]        Pt Name: Caitlin Guerrero  MRN: [id number]  [place] 1980  Date of evaluation: 5/12/2022  Provider: ANIKA Armstrong CNP  PCP: No primary care provider on file. Note Started: 5:59 PM EDT       I have seen and evaluated this patient with my supervising physician Ray Lange MD.      [address]       Chief Complaint   Patient presents with    Chest Pain     started last night, pt began chemo Tuesday         HISTORY OF PRESENT ILLNESS   (Location/Symptom, Timing/Onset, Context/Setting, Quality, Duration, Modifying Factors, Severity)  Note limiting factors. Caitlin Guerrero is a 36 y.o. female who presents to the ED reporting chest pain is started midnight and has been constant until she got to the ED today. Chest pain is associated some nausea, vomiting. Pain is better when she sits up worse when she lays down. Chest pain associate with some shortness of breath. Patient did recently start some chemo for liver cancer. Oncologist is Dr. Tu Smith. Nursing Notes were all reviewed and agreed with or any disagreements were addressed in the HPI. REVIEW OF SYSTEMS    (2-9 systems for level 4, 10 or more for level 5)     Review of Systems   Constitutional: Negative for chills, diaphoresis and fever. HENT: Negative for congestion, rhinorrhea and sore throat. Eyes: Negative for pain and visual disturbance. Respiratory: Positive for shortness of breath. Negative for cough. Cardiovascular: Positive for chest pain. Negative for leg swelling. Gastrointestinal: Positive for abdominal pain and nausea. Negative for constipation, diarrhea and vomiting. Genitourinary: Negative for dysuria, frequency and hematuria. Musculoskeletal: Negative for back pain and neck pain. Skin: Negative for rash and wound. Neurological: Negative for dizziness and light-headedness.        PAST MEDICAL HISTORY     Past Medical History:   Diagnosis Date    Asthma     Colon cancer [facility])        SURGICAL HISTORY     Past Surgical History:   Procedure Laterality Date    COLONOSCOPY N/A 4/11/2022    COLONOSCOPY WITH BIOPSY performed by Juan Antonio Quesada MD at [address]  4/11/2022    COLONOSCOPY SUBMUCOSAL tattoo  INJECTION performed by Juan Antonio Quesada MD at [address] 4/13/2022    LAPAROSCOPIC TRANSVERSE COLECTOMY WITH COLOSTOMY, INSERTION OF PORT A CATHETER, LAPAROSCOPIC LIVER BIOPSY performed by Rashmi Martinez MD at [facility]       Previous Medications    DICYCLOMINE (BENTYL) 10 MG CAPSULE    Take 2 capsules by mouth every 6 hours as needed (cramps)    LEVOFLOXACIN (LEVAQUIN) 500 MG TABLET    Take 1 tablet by mouth daily    PANTOPRAZOLE (PROTONIX) 40 MG TABLET    Take 1 tablet by mouth every morning (before breakfast)    POLYETHYLENE GLYCOL (GLYCOLAX) 17 G PACKET    Take 17 g by mouth daily    PROCHLORPERAZINE (COMPAZINE) 10 MG TABLET    Take 1 tablet by mouth every 6 hours as needed (nausea)       ALLERGIES     Codeine and Pcn [penicillins]    FAMILYHISTORY     History reviewed. No pertinent family history.      SOCIAL HISTORY       Social History     Socioeconomic History    Marital status: Single     Spouse name: None    Number of children: None    Years of education: None    Highest education level: None   Occupational History    None   Tobacco Use    Smoking status: Never Smoker    Smokeless tobacco: Never Used   Substance and Sexual Activity    Alcohol use: Yes     Comment: socially    Drug use: No    Sexual activity: None   Other Topics Concern    None   Social History Narrative    None     Social Determinants of Health     Financial Resource Strain:     Difficulty of Paying Living Expenses: Not on file   Food Insecurity:     Worried About Running Out of Food in the Last Year: Not on file    Jose of Food in the Last Year: Not on file   Transportation Needs:     Lack of Transportation (Medical): Not on file    Lack of Transportation (Non-Medical): Not on file   Physical Activity:     Days of Exercise per Week: Not on file    Minutes of Exercise per Session: Not on file   Stress:     Feeling of Stress : Not on file   Social Connections:     Frequency of Communication with Friends and Family: Not on file    Frequency of Social Gatherings with Friends and Family: Not on file    Attends Rastafari Services: Not on file    Active Member of [address] [place] [address] [employer] or Organizations: Not on file    Attends Club or Organization Meetings: Not on file    Marital Status: Not on file   Intimate Partner Violence:     Fear of Current or Ex-Partner: Not on file    Emotionally Abused: Not on file    Physically Abused: Not on file    Sexually Abused: Not on file   Housing Stability:     Unable to Pay for Housing in the Last Year: Not on file    Number of [place] in the Last Year: Not on file    Unstable Housing in the Last Year: Not on file       SCREENINGS    Efra Coma Scale  Eye Opening: Spontaneous  Best Verbal Response: Oriented  Best Motor Response: Obeys commands  Dawson Coma Scale Score: 15        PHYSICAL EXAM    (up to 7 for level 4, 8 or more for level 5)     ED Triage Vitals [05/12/22 1318]   BP Temp Temp Source Pulse Resp SpO2 Height Weight   130/87 98.7 °F (37.1 °C) Oral 92 18 100 % 5' 6\" (1.676 m) 201 lb (91.2 kg)       Physical Exam  Vitals and nursing note reviewed. Constitutional:       General: She is in acute distress. Appearance: Normal appearance. She is obese. She is not ill-appearing or diaphoretic. HENT:      Head: Normocephalic and atraumatic. Right Ear: External ear normal.      Left Ear: External ear normal.      Nose: Nose normal. No congestion or rhinorrhea. Mouth/Throat:      Mouth: Mucous membranes are moist.      Pharynx: Oropharynx is clear.  No oropharyngeal exudate or posterior oropharyngeal erythema. Eyes:      General: No scleral icterus. Right eye: No discharge. Left eye: No discharge. Extraocular Movements: Extraocular movements intact. Conjunctiva/sclera: Conjunctivae normal.      Pupils: Pupils are equal, round, and reactive to light. Cardiovascular:      Rate and Rhythm: Normal rate and regular rhythm. Pulses: Normal pulses. Heart sounds: Normal heart sounds. No murmur heard. No friction rub. No gallop. Pulmonary:      Effort: Pulmonary effort is normal. No respiratory distress. Breath sounds: Normal breath sounds. No stridor. No wheezing, rhonchi or rales. Abdominal:      General: Abdomen is flat. There is no distension. Tenderness: There is generalized abdominal tenderness. Musculoskeletal:         General: No deformity. Normal range of motion. Cervical back: Normal range of motion and neck supple. No rigidity. Right lower leg: No edema. Left lower leg: No edema. Skin:     General: Skin is warm and dry. Capillary Refill: Capillary refill takes less than 2 seconds. Coloration: Skin is not jaundiced or pale. Findings: No rash. Neurological:      General: No focal deficit present. Mental Status: She is alert and oriented to person, place, and time. Mental status is at baseline.    Psychiatric:         Mood and Affect: Mood normal.         Behavior: Behavior normal.         DIAGNOSTIC RESULTS   LABS:    Labs Reviewed   CBC WITH AUTO DIFFERENTIAL - Abnormal; Notable for the following components:       Result Value    RBC 3.20 (*)     Hemoglobin 10.0 (*)     Hematocrit 29.6 (*)     All other components within normal limits   BASIC METABOLIC PANEL W/ REFLEX TO MG FOR LOW K - Abnormal; Notable for the following components:    Sodium 134 (*)     Chloride 96 (*)     Glucose 110 (*)     All other components within normal limits   HEPATIC FUNCTION PANEL - Abnormal; Notable for the following components: Total Protein 8.3 (*)     Alkaline Phosphatase 217 (*)     AST 88 (*)     All other components within normal limits   LIPASE   TROPONIN   TROPONIN       When ordered, only abnormal lab results are displayed. All other labs were within normal range or not returned as of this dictation. EKG: When ordered, EKG's are interpreted by the Emergency Department Physician in the absence of a cardiologist.  Please see their note for interpretation of EKG. RADIOLOGY:   Non-plain film images such as CT, Ultrasound and MRI are read by the radiologist. Plain radiographic images are visualized andpreliminarily interpreted by the  ED Provider with the below findings:        Interpretation perthe Radiologist below, if available at the time of this note:    CT CHEST PULMONARY EMBOLISM W CONTRAST   Final Result   1. No evidence of pulmonary embolism or acute pulmonary abnormality. 2.  2 cm inferior right internal mammary lymph node consistent with a   metastatic deposit. 3.  Heterogeneity of the liver parenchyma may correspond to previously   demonstrated liver lesions, however these are not well delineated for   comparison on this exam.           CT CHEST PULMONARY EMBOLISM W CONTRAST    Result Date: 5/12/2022  EXAMINATION: CTA OF THE CHEST 5/12/2022 2:53 pm TECHNIQUE: CTA of the chest was performed after the administration of intravenous contrast.  Multiplanar reformatted images are provided for review. MIP images are provided for review. Automated exposure control, iterative reconstruction, and/or weight based adjustment of the mA/kV was utilized to reduce the radiation dose to as low as reasonably achievable. COMPARISON: Chest CT 04/27/2022 and 04/14/2022 HISTORY: ORDERING SYSTEM PROVIDED HISTORY: sob, cp, h/o metastatic disease. pain worse with laying flat TECHNOLOGIST PROVIDED HISTORY: Reason for exam:->sob, cp, h/o metastatic disease.  pain worse with laying flat Decision Support Exception - unselect if not a suspected or confirmed emergency medical condition->Emergency Medical Condition (MA) Reason for Exam: sob, cp, h/o metastatic disease. pain worse with laying flat FINDINGS: Pulmonary Arteries: Pulmonary arteries are adequately opacified for evaluation. No evidence of intraluminal filling defect to suggest pulmonary embolism. Main pulmonary artery is normal in caliber. Mediastinum: No evidence of mediastinal lymphadenopathy. Trace pericardial fluid. There is no acute abnormality of the thoracic aorta. Left internal jugular line terminates at the right atrial level. Lungs/pleura: The lungs are without acute process. No focal consolidation or pulmonary edema. No evidence of pleural effusion or pneumothorax. Upper Abdomen: No acute findings previously noted liver lesions are not well delineated or compared to prior exams. Subtle heterogeneity in the liver attenuation is noted suggesting persistence of these lesions however. Soft Tissues/Bones: No acute bone or soft tissue abnormality. 1.  No evidence of pulmonary embolism or acute pulmonary abnormality. 2.  2 cm inferior right internal mammary lymph node consistent with a metastatic deposit. 3.  Heterogeneity of the liver parenchyma may correspond to previously demonstrated liver lesions, however these are not well delineated for comparison on this exam.         PROCEDURES   Unless otherwise noted below, none     Procedures    CRITICAL CARE TIME   Total Critical Care time was 20 minutes, excluding separately reportable procedures. There was a high probability of clinically significant/life threatening deterioration in the patient's condition which required my urgent intervention.   This time spent assessing, reassessing patient, chart review and discussing case with other providers      CONSULTS:  None      EMERGENCY DEPARTMENT COURSE and DIFFERENTIAL DIAGNOSIS/MDM:   Vitals:    Vitals:    05/12/22 1318 05/12/22 1602   BP: 130/87 (!) 136/95   Pulse: 92 83   Resp: 18 20   Temp: 98.7 °F (37.1 °C)    TempSrc: Oral    SpO2: 100% 98%   Weight: 201 lb (91.2 kg)    Height: 5' 6\" (1.676 m)        Patient was given thefollowing medications:  Medications   iopamidol (ISOVUE-370) 76 % injection 75 mL (75 mLs IntraVENous Given 5/12/22 0306)   aluminum & magnesium hydroxide-simethicone (MAALOX) 30 mL, lidocaine viscous hcl (XYLOCAINE) 5 mL (GI COCKTAIL) ( Oral Given 5/12/22 1631)         Is this patient to be included in the SEP-1 Core Measure due to severe sepsis or septic shock? No   Exclusion criteria - the patient is NOT to be included for SEP-1 Core Measure due to:  2+ SIRS criteria are not met    62-year-old female presenting to ED for chest pain, shortness of breath as above. Differential Diagnosis: Acute Coronary Syndrome, Congestive Heart Failure, Thoracic Dissection, Pericarditis, Pericardial Effusion, Pulmonary Embolism, Pneumonia, Pneumothorax, Ischemic Bowel, Bowel Obstruction, PUD, GERD, Acute Cholecystitis, Pancreatitis, Hepatitis, Colitis, other    PE as above. Labs: Reviewed 2 troponins both less than 0.01. CBC with stable chronic anemia result slightly better than prior labs on 4/29/2022. BMP, hepatic panel also reviewed. Results consistent with her history of liver cancer    Based on the patient's history, CT PE protocol was ordered which reveals no acute pulmonary embolus, results are above. My attending discussed these results with the patient. With respect to the patient's complaint of chest pain:   The patient's EKG reveals no acute ischemic abnormalities. Troponin is within normal limits. The presentation is not consistent with pulmonary embolism based on  Negative CT PE. Not consistent with esophageal rupture as patient is nontoxic and no history of multiple episodes of forceful vomiting. Not consistent with pneumothorax as negative imaging.  Not consistent with aortic dissection as pain not tearing, not sudden in onset, not migratory, pulses are symmetric, and there are no neurological deficits. As below, patient's HEART score calculates to low risk, and therefore supports early discharge with close PCP follow up. HEART SCORE:      Heart score: 2. This falls under the following category: Score of 0-3, which indicates a very low risk for major adverse cardiac event and supports early discharge    Patient was given a dose of GI cocktail and reports feeling much better       FINAL IMPRESSION      1.  Chest pain, unspecified type          DISPOSITION/PLAN   DISPOSITION Decision To Discharge 05/12/2022 05:29:32 PM      PATIENT REFERREDTO:  [facility]) Pre-Services  [phone]  Schedule an appointment as soon as possible for a visit         DISCHARGE MEDICATIONS:  New Prescriptions    ALUMINUM & MAGNESIUM HYDROXIDE-SIMETHICONE (MAALOX ADVANCED MAX ST) 400-400-40 MG/5ML SUSP    Take 15 mLs by mouth every 4 hours as needed (nausea or reflux)       DISCONTINUED MEDICATIONS:  Discontinued Medications    No medications on file              (Please note that portions ofthis note were completed with a voice recognition program.  Efforts were made to edit the dictations but occasionally words are mis-transcribed.)    ANIKA Roe CNP (electronically signed)             ANIKA Roe CNP  05/12/22 4092

## 2022-05-13 LAB
EKG ATRIAL RATE: 84 BPM
EKG DIAGNOSIS: NORMAL
EKG P AXIS: 50 DEGREES
EKG P-R INTERVAL: 166 MS
EKG Q-T INTERVAL: 392 MS
EKG QRS DURATION: 96 MS
EKG QTC CALCULATION (BAZETT): 463 MS
EKG R AXIS: 36 DEGREES
EKG T AXIS: 33 DEGREES
EKG VENTRICULAR RATE: 84 BPM

## 2022-05-13 PROCEDURE — 93010 ELECTROCARDIOGRAM REPORT: CPT | Performed by: INTERNAL MEDICINE

## 2022-05-29 ENCOUNTER — HOSPITAL ENCOUNTER (EMERGENCY)
Age: 42
Discharge: HOME | End: 2022-05-29
Attending: EMERGENCY MEDICINE
Payer: COMMERCIAL

## 2022-05-29 VITALS
SYSTOLIC BLOOD PRESSURE: 142 MMHG | HEART RATE: 97 BPM | BODY MASS INDEX: 31.46 KG/M2 | TEMPERATURE: 98.1 F | HEIGHT: 66 IN | OXYGEN SATURATION: 100 % | RESPIRATION RATE: 14 BRPM | DIASTOLIC BLOOD PRESSURE: 106 MMHG | WEIGHT: 195.77 LBS

## 2022-05-29 DIAGNOSIS — K64.8 PROLAPSED INTERNAL HEMORRHOIDS: Primary | ICD-10-CM

## 2022-05-29 PROCEDURE — 6370000000 HC RX 637 (ALT 250 FOR IP): Performed by: EMERGENCY MEDICINE

## 2022-05-29 PROCEDURE — 99283 EMERGENCY DEPT VISIT LOW MDM: CPT

## 2022-05-29 RX ORDER — PRAMOXINE HYDROCHLORIDE HYDROCORTISONE ACETATE 100; 100 MG/10G; MG/10G
AEROSOL, FOAM TOPICAL
Qty: 1 EACH | Refills: 1 | Status: SHIPPED | OUTPATIENT
Start: 2022-05-29

## 2022-05-29 RX ORDER — LIDOCAINE HYDROCHLORIDE 20 MG/ML
15 SOLUTION OROPHARYNGEAL ONCE
Status: COMPLETED | OUTPATIENT
Start: 2022-05-29 | End: 2022-05-29

## 2022-05-29 RX ADMIN — LIDOCAINE HYDROCHLORIDE 15 ML: 20 SOLUTION ORAL; TOPICAL at 08:54

## 2022-05-29 NOTE — ED PROVIDER NOTES
[id number] [facility] PROVIDER NOTE    Patient Identification  Pt Name: Eugenia Sykes  MRN: [id number]  [place] 1980  Date of evaluation: 5/29/2022  Provider: Luigi Flowers MD  PCP: No primary care provider on file. HPI  Eugenia Sykes is a 36 y.o. female who presents to the ED for rectal pain and bleeding. Patient has had burning pain for the last 3 to 4 days, first noticing bright red blood when wiping the area yesterday. Patient does not pass bowel movements through her rectum and anus. Rather, she is undergoing treatment for colon cancer and has a colostomy. She denies having any other symptoms at this time concluding lightheadedness, nausea, vomiting, abdominal pain, fever, or chills. She states the pain in the area is 8/10. It is worse when it is touched. Her surgeon is Dr. Neda Verdugo. She is currently undergoing chemotherapy. No other complaints, modifying factors or associated symptoms. Nursing notes reviewed.    Allergies: Codeine and Pcn [penicillins]  Past medical history:   Past Medical History:   Diagnosis Date    Asthma     Colon cancer [facility])      Past surgical history:   Past Surgical History:   Procedure Laterality Date    COLONOSCOPY N/A 4/11/2022    COLONOSCOPY WITH BIOPSY performed by Corey Lu MD at [address]  4/11/2022    COLONOSCOPY SUBMUCOSAL tattoo  INJECTION performed by Corey Lu MD at [address] 4/13/2022    LAPAROSCOPIC TRANSVERSE COLECTOMY WITH COLOSTOMY, INSERTION OF PORT A CATHETER, LAPAROSCOPIC LIVER BIOPSY performed by Stewart Lema MD at [address] [facility] medications:   Previous Medications    ALUMINUM & MAGNESIUM HYDROXIDE-SIMETHICONE (MAALOX ADVANCED MAX ST) 400-400-40 MG/5ML SUSP    Take 15 mLs by mouth every 4 hours as needed (nausea or reflux)    DICYCLOMINE (BENTYL) 10 MG CAPSULE    Take 2 capsules by mouth every 6 hours as needed (cramps)    LEVOFLOXACIN (LEVAQUIN) 500 MG TABLET    Take 1 tablet by mouth daily    PANTOPRAZOLE (PROTONIX) 40 MG TABLET    Take 1 tablet by mouth every morning (before breakfast)    PROCHLORPERAZINE (COMPAZINE) 10 MG TABLET    Take 1 tablet by mouth every 6 hours as needed (nausea)     Social history:  reports that she has never smoked. She has never used smokeless tobacco. She reports current alcohol use. She reports that she does not use drugs. Family history:  History reviewed. No pertinent family history. REVIEW OF SYSTEMS  8 systems reviewed, pertinent positives per HPI otherwise noted to be negative    PHYSICAL EXAM  BP (!) 142/106   Pulse 97   Temp 98.1 °F (36.7 °C) (Oral)   Resp 14   Ht 5' 6.25\" (1.683 m)   Wt 195 lb 12.3 oz (88.8 kg)   LMP 05/14/2022   SpO2 100%   BMI 31.36 kg/m²   GENERAL APPEARANCE: Awake and alert. Cooperative. No acute distress. HEAD: Normocephalic. Atraumatic. EYES: Anicteric sclera. EOM grossly intact. ENT: Mucous membranes are moist.   CV: Brisk capillary refill. LUNGS: Breathing is unlabored. Speaking comfortably in full sentences. EXTREMITIES: MAEE. No acute deformities. RECTAL: Small, prolapsed internal hemorrhoid without thrombosis or active bleeding. The area is tender to palpation. The hemorrhoid is easily reducible. No anal fissures, abscesses, or other abnormalities. SKIN: Warm and dry. NEUROLOGICAL: Alert and oriented. Normal coordination. ED COURSE/MDM  Patient seen and evaluated. The area of the patient's anus and rectum appeared well and clean. The hemorrhoid was easily reducible, which is reassuring. She had good rectal tone, which is also reassuring. Despite her complicated recent history of colon cancer and active chemo, she is well-appearing and has no other complaints. initial pretriage heart rate read as 131, but this seemed consistent with the patient's presentation. When I rechecked it at triage, it was 92 bpm.  I believe the first reading was erroneous.  She has a surgeon, Dr. Ortega Roy, who performed her colonectomy. I advised her to follow up with him. Since her hemorrhoid is reducible, it can be managed on an outpatient basis. We provided symptomatic relief here with viscous lidocaine and will send with proctofoam HC. I discussed findings and plan with Patient. I do feel patient can be safely discharged to home. Patient expresses understanding and is in agreement with plan. Patient Referrals:  [place] [address]  [phone]    As needed, If symptoms worsen or new symptoms develop    Vick Elmore MD  [address] [place] [address]    Schedule an appointment as soon as possible for a visit in 3 days  for re-evaluation      Discharge Medications:  New Prescriptions    HYDROCORT-PRAMOXINE, PERIANAL, (PROCTOFOAM HC) 1-1 % RECTAL FOAM    Apply to affected area of rectum 3-4 times daily as needed for pain. FINAL IMPRESSION  1. Prolapsed internal hemorrhoids        Blood pressure (!) 142/106, pulse 97, temperature 98.1 °F (36.7 °C), temperature source Oral, resp. rate 14, height 5' 6.25\" (1.683 m), weight 195 lb 12.3 oz (88.8 kg), last menstrual period 05/14/2022, SpO2 100 %, not currently breastfeeding. DISPOSITION  Patient was discharged to home in good condition. This chart was generated using the [address] [place] [address] [address]  dictation system. I created this record but it may contain dictation errors given the limitations of this technology.        Wilber Nuno MD  05/29/22 8302

## 2022-06-02 ENCOUNTER — OFFICE VISIT (OUTPATIENT)
Dept: SURGERY | Age: 42
End: 2022-06-02

## 2022-06-02 VITALS — DIASTOLIC BLOOD PRESSURE: 93 MMHG | SYSTOLIC BLOOD PRESSURE: 153 MMHG | HEART RATE: 80 BPM

## 2022-06-02 DIAGNOSIS — K64.8 PROLAPSED INTERNAL HEMORRHOIDS: Primary | ICD-10-CM

## 2022-06-02 DIAGNOSIS — C78.7 ADENOCARCINOMA OF COLON METASTATIC TO LIVER (HCC): ICD-10-CM

## 2022-06-02 DIAGNOSIS — C18.9 ADENOCARCINOMA OF COLON METASTATIC TO LIVER (HCC): ICD-10-CM

## 2022-06-02 PROCEDURE — 99024 POSTOP FOLLOW-UP VISIT: CPT | Performed by: SURGERY

## 2022-06-02 RX ORDER — LIDOCAINE 40 MG/G
CREAM TOPICAL
Qty: 1 EACH | Refills: 2 | Status: SHIPPED | OUTPATIENT
Start: 2022-06-02

## 2022-06-03 NOTE — PROGRESS NOTES
Established Patient Visit    [facility]   Veronica Salazar MD    [address], [place] [address]  Phone: [phone]  Fax: [phone]    Lisa Peterson   YOB: 1980    Date of Visit:  6/2/2022    No ref. provider found  No primary care provider on file. Subjective:     Lisa Peterson presents for a follow-up of prolapsed hemorrhoids. She states that they have been bother her for a week. She has had hemorrhoids for a while but this is more severe than normal.  She can feel the hemorrhoid which is very tender. She is not having bowel movements due to her colostomy. She is currently getting chemotherapy. She denies any fevers or chills. Allergies   Allergen Reactions    Codeine Hives    Pcn [Penicillins] Hives     Outpatient Medications Marked as Taking for the 6/2/22 encounter (Office Visit) with Susanne Kirkland MD   Medication Sig Dispense Refill    lidocaine (LMX) 4 % cream Apply topically as needed. 1 each 2    Hydrocort-Pramoxine, Perianal, (PROCTOFOAM HC) 1-1 % rectal foam Apply to affected area of rectum 3-4 times daily as needed for pain. 1 each 1    aluminum & magnesium hydroxide-simethicone (MAALOX ADVANCED MAX ST) 400-400-40 MG/5ML SUSP Take 15 mLs by mouth every 4 hours as needed (nausea or reflux) 360 mL 0    prochlorperazine (COMPAZINE) 10 MG tablet Take 1 tablet by mouth every 6 hours as needed (nausea) 120 tablet 3    levoFLOXacin (LEVAQUIN) 500 MG tablet Take 1 tablet by mouth daily 5 tablet 0    dicyclomine (BENTYL) 10 MG capsule Take 2 capsules by mouth every 6 hours as needed (cramps) 120 capsule 3    pantoprazole (PROTONIX) 40 MG tablet Take 1 tablet by mouth every morning (before breakfast) 30 tablet 3       Vitals:    06/02/22 1528   BP: (!) 153/93   Pulse: 80     There is no height or weight on file to calculate BMI.      Wt Readings from Last 3 Encounters:   05/29/22 195 lb 12.3 oz (88.8 kg)   05/12/22 201 lb (91.2 kg)   04/29/22 212 lb 4.9 oz (96.3 kg)     BP Readings from Last 3 Encounters:   06/02/22 (!) 153/93   05/29/22 (!) 142/106   05/12/22 120/80          Objective:    CONSTITUTIONAL:  awake, alert, no apparent distress    LUNGS:  Resp easy and unlabored  ABDOMEN:  Soft, NT, ND, stoma in LUQ pink, healed laparoscopic scars  MUSCULOSKELETAL: No edema  NEUROLOGIC:  Mental Status Exam:  Level of Alertness:   awake  Orientation:   person, place, time  RECTAL: protruding internal hemorrhoid without discoloration, able to be reduced manually, small non-thrombosed external hemorrhoid, no blood in rectal vault        Pathology:  FINAL DIAGNOSIS:        A. Liver biopsy:        - Adenocarcinoma, consistent with colonic primary.        B. Colon, transverse, resection:        - Invasive adenocarcinoma- See Comment/ Case Summary.        - Resection margin negative for carcinoma.        - Adenocarcinoma involving three of twelve lymph nodes (3/12). ASSESSMENT:     Diagnosis Orders   1. Prolapsed internal hemorrhoids  lidocaine (LMX) 4 % cream   2. 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 I was able to reduce it on exam.  Lidocaine cream given to patient from office and also prescribed. Continue proctofoam.  I did not recommend excision of her hemorrhoid due to concern for a degree of portal hypertension due to her liver metastases. Continue chemotherapy for her metastatic colon cancer. Ostomy functioning well. Follow up prn.       Electronically signed by Divya Farfan MD on 6/2/2022 at 11:00 PM

## 2022-06-17 ENCOUNTER — HOSPITAL ENCOUNTER (EMERGENCY)
Age: 42
Discharge: HOME | End: 2022-06-17
Attending: EMERGENCY MEDICINE
Payer: COMMERCIAL

## 2022-06-17 ENCOUNTER — APPOINTMENT (OUTPATIENT)
Dept: CT IMAGING | Age: 42
End: 2022-06-17
Payer: COMMERCIAL

## 2022-06-17 ENCOUNTER — APPOINTMENT (OUTPATIENT)
Dept: GENERAL RADIOLOGY | Age: 42
End: 2022-06-17
Payer: COMMERCIAL

## 2022-06-17 VITALS
OXYGEN SATURATION: 99 % | HEIGHT: 66 IN | BODY MASS INDEX: 33.94 KG/M2 | HEART RATE: 61 BPM | WEIGHT: 211.2 LBS | RESPIRATION RATE: 17 BRPM | DIASTOLIC BLOOD PRESSURE: 88 MMHG | SYSTOLIC BLOOD PRESSURE: 184 MMHG | TEMPERATURE: 98.8 F

## 2022-06-17 DIAGNOSIS — R11.2 NON-INTRACTABLE VOMITING WITH NAUSEA, UNSPECIFIED VOMITING TYPE: ICD-10-CM

## 2022-06-17 DIAGNOSIS — R51.9 NONINTRACTABLE EPISODIC HEADACHE, UNSPECIFIED HEADACHE TYPE: Primary | ICD-10-CM

## 2022-06-17 LAB
A/G RATIO: 1 (ref 1.1–2.2)
ALBUMIN SERPL-MCNC: 3.1 G/DL (ref 3.4–5)
ALP BLD-CCNC: 89 U/L (ref 40–129)
ALT SERPL-CCNC: 73 U/L (ref 10–40)
ANION GAP SERPL CALCULATED.3IONS-SCNC: 14 MMOL/L (ref 3–16)
AST SERPL-CCNC: 109 U/L (ref 15–37)
BASOPHILS ABSOLUTE: 0 K/UL (ref 0–0.2)
BASOPHILS RELATIVE PERCENT: 0.6 %
BILIRUB SERPL-MCNC: 0.8 MG/DL (ref 0–1)
BUN BLDV-MCNC: 8 MG/DL (ref 7–20)
CALCIUM SERPL-MCNC: 8.8 MG/DL (ref 8.3–10.6)
CHLORIDE BLD-SCNC: 99 MMOL/L (ref 99–110)
CO2: 24 MMOL/L (ref 21–32)
CREAT SERPL-MCNC: 0.6 MG/DL (ref 0.6–1.1)
EOSINOPHILS ABSOLUTE: 0 K/UL (ref 0–0.6)
EOSINOPHILS RELATIVE PERCENT: 0.1 %
GFR AFRICAN AMERICAN: >60
GFR NON-AFRICAN AMERICAN: >60
GLUCOSE BLD-MCNC: 90 MG/DL (ref 70–99)
HCT VFR BLD CALC: 27 % (ref 36–48)
HEMOGLOBIN: 9.3 G/DL (ref 12–16)
LIPASE: 15 U/L (ref 13–60)
LYMPHOCYTES ABSOLUTE: 0.9 K/UL (ref 1–5.1)
LYMPHOCYTES RELATIVE PERCENT: 19.3 %
MAGNESIUM: 1.5 MG/DL (ref 1.8–2.4)
MCH RBC QN AUTO: 31.9 PG (ref 26–34)
MCHC RBC AUTO-ENTMCNC: 34.5 G/DL (ref 31–36)
MCV RBC AUTO: 92.5 FL (ref 80–100)
MONOCYTES ABSOLUTE: 0.2 K/UL (ref 0–1.3)
MONOCYTES RELATIVE PERCENT: 4.4 %
NEUTROPHILS ABSOLUTE: 3.6 K/UL (ref 1.7–7.7)
NEUTROPHILS RELATIVE PERCENT: 75.6 %
PDW BLD-RTO: 20.1 % (ref 12.4–15.4)
PLATELET # BLD: 168 K/UL (ref 135–450)
PMV BLD AUTO: 8 FL (ref 5–10.5)
POTASSIUM REFLEX MAGNESIUM: 3.3 MMOL/L (ref 3.5–5.1)
RBC # BLD: 2.92 M/UL (ref 4–5.2)
SARS-COV-2, NAAT: NOT DETECTED
SODIUM BLD-SCNC: 137 MMOL/L (ref 136–145)
TOTAL PROTEIN: 6.1 G/DL (ref 6.4–8.2)
WBC # BLD: 4.7 K/UL (ref 4–11)

## 2022-06-17 PROCEDURE — 83735 ASSAY OF MAGNESIUM: CPT

## 2022-06-17 PROCEDURE — 83690 ASSAY OF LIPASE: CPT

## 2022-06-17 PROCEDURE — 85025 COMPLETE CBC W/AUTO DIFF WBC: CPT

## 2022-06-17 PROCEDURE — 87635 SARS-COV-2 COVID-19 AMP PRB: CPT

## 2022-06-17 PROCEDURE — 80053 COMPREHEN METABOLIC PANEL: CPT

## 2022-06-17 PROCEDURE — 36415 COLL VENOUS BLD VENIPUNCTURE: CPT

## 2022-06-17 PROCEDURE — 99284 EMERGENCY DEPT VISIT MOD MDM: CPT

## 2022-06-17 PROCEDURE — 96375 TX/PRO/DX INJ NEW DRUG ADDON: CPT

## 2022-06-17 PROCEDURE — 6360000002 HC RX W HCPCS: Performed by: EMERGENCY MEDICINE

## 2022-06-17 PROCEDURE — 70450 CT HEAD/BRAIN W/O DYE: CPT

## 2022-06-17 PROCEDURE — 2580000003 HC RX 258: Performed by: EMERGENCY MEDICINE

## 2022-06-17 PROCEDURE — 96374 THER/PROPH/DIAG INJ IV PUSH: CPT

## 2022-06-17 PROCEDURE — 71045 X-RAY EXAM CHEST 1 VIEW: CPT

## 2022-06-17 RX ORDER — DIPHENHYDRAMINE HYDROCHLORIDE 50 MG/ML
25 INJECTION INTRAMUSCULAR; INTRAVENOUS ONCE
Status: COMPLETED | OUTPATIENT
Start: 2022-06-17 | End: 2022-06-17

## 2022-06-17 RX ORDER — METOCLOPRAMIDE HYDROCHLORIDE 5 MG/ML
10 INJECTION INTRAMUSCULAR; INTRAVENOUS ONCE
Status: COMPLETED | OUTPATIENT
Start: 2022-06-17 | End: 2022-06-17

## 2022-06-17 RX ORDER — 0.9 % SODIUM CHLORIDE 0.9 %
1000 INTRAVENOUS SOLUTION INTRAVENOUS ONCE
Status: COMPLETED | OUTPATIENT
Start: 2022-06-17 | End: 2022-06-17

## 2022-06-17 RX ORDER — ONDANSETRON 4 MG/1
4 TABLET, ORALLY DISINTEGRATING ORAL EVERY 8 HOURS PRN
Qty: 20 TABLET | Refills: 0 | Status: SHIPPED | OUTPATIENT
Start: 2022-06-17

## 2022-06-17 RX ADMIN — SODIUM CHLORIDE 1000 ML: 9 INJECTION, SOLUTION INTRAVENOUS at 07:45

## 2022-06-17 RX ADMIN — DIPHENHYDRAMINE HYDROCHLORIDE 25 MG: 50 INJECTION, SOLUTION INTRAMUSCULAR; INTRAVENOUS at 07:45

## 2022-06-17 RX ADMIN — METOCLOPRAMIDE HYDROCHLORIDE 10 MG: 5 INJECTION INTRAMUSCULAR; INTRAVENOUS at 07:46

## 2022-06-17 ASSESSMENT — PAIN DESCRIPTION - FREQUENCY: FREQUENCY: CONTINUOUS

## 2022-06-17 ASSESSMENT — PAIN DESCRIPTION - LOCATION
LOCATION: HEAD
LOCATION: HEAD

## 2022-06-17 ASSESSMENT — PAIN DESCRIPTION - DESCRIPTORS
DESCRIPTORS: ACHING
DESCRIPTORS: THROBBING

## 2022-06-17 ASSESSMENT — PAIN SCALES - GENERAL
PAINLEVEL_OUTOF10: 7
PAINLEVEL_OUTOF10: 2
PAINLEVEL_OUTOF10: 2

## 2022-06-17 ASSESSMENT — PAIN DESCRIPTION - ONSET: ONSET: ON-GOING

## 2022-06-17 ASSESSMENT — PAIN DESCRIPTION - PAIN TYPE: TYPE: ACUTE PAIN

## 2022-06-17 ASSESSMENT — PAIN - FUNCTIONAL ASSESSMENT
PAIN_FUNCTIONAL_ASSESSMENT: 0-10
PAIN_FUNCTIONAL_ASSESSMENT: 0-10

## 2022-06-17 NOTE — Clinical Note
Elva Bajwa was seen and treated in our emergency department on 6/17/2022. She may return to work on 06/20/2022. If you have any questions or concerns, please don't hesitate to call.       Celio Singleton, DO

## 2022-06-17 NOTE — ED PROVIDER NOTES
[address] [place] [address] ENCOUNTER      Pt Name: Scooter Allen  MRN: [id number]  [place] 1980  Date of evaluation: 6/17/2022  Provider: Rose Tirado, [address] [place] [address]       Chief Complaint   Patient presents with    Headache     States that she has had a \"throbbing\" headache in the front of her head for the past 3 days. HISTORY OF PRESENT ILLNESS   (Location/Symptom, Timing/Onset, Context/Setting, Quality, Duration, Modifying Factors, Severity)  Note limiting factors. Scooter Allen is a 36 y.o. female who presents to the emergency department the complaint of a constant, sharp throbbing bifrontal headache that began on Tuesday evening. She rates her headache currently a 6/10 intensity. She reports it was mild at the onset and is gradually worsened. She has had several episodes of vomiting over the last 48 hours. She denies any associated fever, chills cough or cold symptoms. She denies any associated abdominal pain or diarrhea. She denies any melena or hematochezia. She denies any neck pain or stiffness and denies any earache sore throat or sinus drainage. She denies any back pain flank pain dysuria hematuria frequency urgency. She denies any vision change, vision loss, speech change, focal weakness or numbness. She denies any trauma or injury. Patient is known to have a history of colon cancer diagnosed in April 2022. She underwent colectomy with colostomy diversion. She is currently receiving chemotherapy every 2 weeks which is a 3-day infusion. She states that port is accessed and she is sent home with a portable pump for 3 days. She finished her last infusion 3 days ago. She denies any associated headaches with prior infusions. She has completed 2 courses of chemotherapy and has 10 sessions remaining. She denies any associated radiation. Does report liver metastasis but denies any other metastatic disease.     She denies any prior history of migraines or headaches. She denies any exposure to COVID. Nursing Notes were reviewed. HPI        REVIEW OF SYSTEMS    (2-9 systems for level 4, 10 or more for level 5)       Constitutional: Negative for fever or chills. HENT: Negative for rhinorrhea and sore throat. Eyes: Negative for redness or drainage. Respiratory: Negative for shortness of breath or dyspnea on exertion. Cardiovascular: Negative for chest pain. Gastrointestinal: Negative for abdominal pain. Negative for vomiting or diarrhea. Genitourinary: Negative for flank pain. Negative for dysuria. Negative for hematuria. Musculoskeletal:  Negative edema. All systems are reviewed and are negative except for those listed above in the history of present illness and ROS. PAST MEDICAL HISTORY     Past Medical History:   Diagnosis Date    Asthma     Colon cancer [facility])          SURGICAL HISTORY       Past Surgical History:   Procedure Laterality Date    COLONOSCOPY N/A 4/11/2022    COLONOSCOPY WITH BIOPSY performed by Jessica Michel MD at [address]  4/11/2022    COLONOSCOPY SUBMUCOSAL tattoo  INJECTION performed by Jessica Michel MD at [address] [facility] 4/13/2022    LAPAROSCOPIC TRANSVERSE COLECTOMY WITH COLOSTOMY, INSERTION OF PORT A CATHETER, LAPAROSCOPIC LIVER BIOPSY performed by Chhaya Cohn MD at [facility]       Previous Medications    ALUMINUM & MAGNESIUM HYDROXIDE-SIMETHICONE (MAALOX ADVANCED MAX ST) 400-400-40 MG/5ML SUSP    Take 15 mLs by mouth every 4 hours as needed (nausea or reflux)    DICYCLOMINE (BENTYL) 10 MG CAPSULE    Take 2 capsules by mouth every 6 hours as needed (cramps)    HYDROCORT-PRAMOXINE, PERIANAL, (PROCTOFOAM HC) 1-1 % RECTAL FOAM    Apply to affected area of rectum 3-4 times daily as needed for pain.     LEVOFLOXACIN (LEVAQUIN) 500 MG TABLET    Take 1 tablet by mouth daily    LIDOCAINE (LMX) 4 Physically Abused: Not on file    Sexually Abused: Not on file   Housing Stability:     Unable to Pay for Housing in the Last Year: Not on file    Number of [place] in the Last Year: Not on file    Unstable Housing in the Last Year: Not on file       SCREENINGS    [place] Coma Scale  Eye Opening: Spontaneous  Best Verbal Response: Oriented  Best Motor Response: Obeys commands  [place] Coma Scale Score: 15        PHYSICAL EXAM    (up to 7 for level 4, 8 or more for level 5)     ED Triage Vitals [06/17/22 0704]   BP Temp Temp Source Heart Rate Resp SpO2 Height Weight   (!) 176/80 98.8 °F (37.1 °C) Oral 64 20 100 % 5' 6\" (1.676 m) 211 lb 3.2 oz (95.8 kg)         Physical Exam   Constitutional: Awake and alert. Very pleasant. Appears in mild discomfort. Head: No visible evidence of trauma. Normocephalic. Eyes: Pupils equal and reactive. No photophobia. Conjunctiva normal.    HENT: Oral mucosa moist.  Airway patent. Pharynx without erythema. Nares were clear. Neck:  Soft and supple. Nontender. No meningeal signs. Heart:  Regular rate and rhythm. No murmur. Lungs:  Clear to auscultation. No wheezes, rales, or ronchi. No conversational dyspnea or accessory muscle use. Chest: Chest wall non-tender. No evidence of trauma. Abdomen:  Soft, nondistended, bowel sounds present. Nontender. Colostomy present in the left lower quadrant. Site is clean and dry without erythema. No guarding rigidity or rebound. No masses. Musculoskeletal: Extremities non-tender with full range of motion. Radial and dorsalis pedis pulses were intact. No calf tenderness erythema or edema. Neurological: Alert and oriented x 3.  CS 15. No dysarthria. No aphasia. No pronator drift. Gait steady. Speech clear. Cranial nerves II-XII intact. No facial droop. No acute focal motor or sensory deficits. Skin: Skin is warm and dry. No rash. Lymphatic:  No lympadenopathy. Psychiatric: Normal mood and affect. Behavior is normal.         DIAGNOSTIC RESULTS     EKG: All EKG's are interpreted by the Emergency Department Physician who either signs or Co-signs this chart in the absence of a cardiologist.        RADIOLOGY:   Non-plain film images such as CT, Ultrasound and MRI are read by the radiologist. Plain radiographic images are visualized and preliminarily interpreted by the emergency physician with the below findings:        Interpretation per the Radiologist below, if available at the time of this note:    CT Head WO Contrast   Final Result   No acute intracranial abnormality. RECOMMENDATIONS:   Unavailable         XR CHEST 1 VIEW   Final Result   Question tiny right pleural effusion. No acute infiltrate. ED BEDSIDE ULTRASOUND:   Performed by ED Physician - none    LABS:  Labs Reviewed   CBC WITH AUTO DIFFERENTIAL - Abnormal; Notable for the following components:       Result Value    RBC 2.92 (*)     Hemoglobin 9.3 (*)     Hematocrit 27.0 (*)     RDW 20.1 (*)     Lymphocytes Absolute 0.9 (*)     All other components within normal limits   COMPREHENSIVE METABOLIC PANEL W/ REFLEX TO MG FOR LOW K - Abnormal; Notable for the following components:    Potassium reflex Magnesium 3.3 (*)     Total Protein 6.1 (*)     Albumin 3.1 (*)     Albumin/Globulin Ratio 1.0 (*)     ALT 73 (*)      (*)     All other components within normal limits   MAGNESIUM - Abnormal; Notable for the following components:    Magnesium 1.50 (*)     All other components within normal limits   COVID-19, RAPID   LIPASE   URINALYSIS WITH REFLEX TO CULTURE       All other labs were within normal range or not returned as of this dictation.     EMERGENCY DEPARTMENT COURSE and DIFFERENTIAL DIAGNOSIS/MDM:   Vitals:    Vitals:    06/17/22 0704 06/17/22 0752   BP: (!) 176/80 (!) 174/98   Pulse: 64 61   Resp: 20 18   Temp: 98.8 °F (37.1 °C)    TempSrc: Oral    SpO2: 100% 98%   Weight: 211 lb 3.2 oz (95.8 kg)    Height: 5' 6\" (1.676 m) MDM      Patient presents with a dull constant throbbing headache since Tuesday evening that was gradual in onset. No associated neck pain or stiffness. She is afebrile. Blood pressure is mildly elevated but she rates her pain a 6/10 intensity. Neck is soft and supple. She has had multiple episodes of vomiting but her abdomen is nontender. She was given normal saline 1 L bolus, Reglan 10 mg IV, Benadryl 25 mg IV. Given her history of malignancy, CT head without contrast was obtained. No suspicion for subarachnoid hemorrhage or meningitis. Rapid COVID was also obtained. REASSESSMENT          8:04 AM: White blood cell count is 4.7. Hemoglobin 9.3. ALT is 73 and AST is 109. Lipase is normal.  Absolute neutrophil count is 3.6. Rapid COVID is negative. Magnesium is 1.5. Advised the patient to eat foods rich in potassium and magnesium such as bananas. CT head is negative. No suspicion for intracranial mass. However, if her symptoms persist she may need further evaluation with MRI brain. X-ray questions tiny right-sided pleural effusion but the patient denies any chest pain shortness of breath. No pleuritic pain. No suspicion for pneumonia. She is stable for discharge and outpatient management. I advised her to contact her oncologist today to notify of her symptoms. It is possible that her headache and vomiting could be related to her recent chemotherapy infusions. She will be given a prescription for Zofran. Drink plenty of fluids. Follow-up with a primary care physician in 1 to 2 days for reexamination. Follow a clear liquid diet for 24 hours. Advance to a bland diet as tolerated. If condition worsens or new symptoms develop, return immediately to the emergency department. CRITICAL CARE TIME   Total Critical Care time was 0 minutes, excluding separately reportable procedures.   There was a high probability of clinically significant/life threatening deterioration in the patient's condition which required my urgent intervention. CONSULTS:  None    PROCEDURES:  Unless otherwise noted below, none     Procedures        FINAL IMPRESSION      1. Nonintractable episodic headache, unspecified headache type    2. Non-intractable vomiting with nausea, unspecified vomiting type          DISPOSITION/PLAN   DISPOSITION        PATIENT REFERRED TO:  Adrianna Álvarez MD  [address] [place] [address]  [phone]    Call today        DISCHARGE MEDICATIONS:  New Prescriptions    ONDANSETRON (ZOFRAN ODT) 4 MG DISINTEGRATING TABLET    Take 1 tablet by mouth every 8 hours as needed for Nausea     Controlled Substances Monitoring:     No flowsheet data found. (Please note that portions of this note were completed with a voice recognition program.  Efforts were made to edit the dictations but occasionally words are mis-transcribed. )    Alayna Jones DO (electronically signed)  Attending Emergency Physician          Mica Freeman DO  06/17/22 5673

## 2022-06-17 NOTE — ED NOTES
Sterile technique performed accessing port in left chest. No complications or difficulty. Pt tolerated well.       Marleen Merida RN  06/17/22 4763

## 2022-06-17 NOTE — ED NOTES
Pt discharged from ED to home. Pt verbalizes understanding to discharge instructions, teach back successful. Pt denies questions at this time. No acute distress noted. Resp even and unlabored. A/ox4. Pt instructed to follow-up as noted - return to ED if symptoms worsen. Pt verbalizes understanding. Discharged per ED MD with discharge instructions. Pt refuses ambulatory assistance to lobby and walks with steady gait.         Edita Proctor RN  06/17/22 5479

## 2022-06-17 NOTE — ED NOTES
Handoff report from Louann Rosales RN. EDMD at bedside. Pt presents to ED with c/o of frontal headache x3 days. Describes pain as throbbing. Reports taken tylenol with no relief. +HTN noted. +N/V. Resp even and unlabored. A/ox4. Call light within reach. Bed in lowest position. Will continue to monitor.       Thomas Shelton RN  06/17/22 8087

## 2022-06-17 NOTE — ED NOTES
Pt reports feeling much better. Resp even and unlabored. A/ox4. No acute distress noted. Denies any need at this time. Call light within reach. Bed in lowest position. Will continue to monitor.           Vivienne Alvarez RN  06/17/22 3739

## 2022-10-14 NOTE — PLAN OF CARE
Problem: Pain:  Goal: Pain level will decrease  Description: Pain level will decrease  Outcome: Ongoing  Goal: Control of acute pain  Description: Control of acute pain  Outcome: Ongoing  Goal: Control of chronic pain  Description: Control of chronic pain  Outcome: Ongoing     Problem: Safety:  Goal: Free from accidental physical injury  Description: Free from accidental physical injury  Outcome: Ongoing  Goal: Free from intentional harm  Description: Free from intentional harm  Outcome: Ongoing     Problem: Daily Care:  Goal: Daily care needs are met  Description: Daily care needs are met  Outcome: Ongoing     Problem: Discharge Planning:  Goal: Patients continuum of care needs are met  Description: Patients continuum of care needs are met  Outcome: Ongoing     Problem: Nutrition  Goal: Optimal nutrition therapy  Outcome: Ongoing moderately impaired/impaired

## (undated) DEVICE — Z DISCONTINUED BY MEDLINE USE 2711682 TRAY SKIN PREP DRY W/ PREM GLV

## (undated) DEVICE — SOLUTION PREP POVIDONE IOD FOR SKIN MUCOUS MEM PRIOR TO

## (undated) DEVICE — SUTURE PERMAHAND SZ 3-0 L30IN NONABSORBABLE BLK SH L26MM K832H

## (undated) DEVICE — SUTURE PDS II SZ 0 L60IN ABSRB VLT L48MM CTX 1/2 CIR Z990G

## (undated) DEVICE — COVER,MAYO STAND,STERILE: Brand: MEDLINE

## (undated) DEVICE — SEALER LAP L37CM MARYLAND JAW OPN NANO COAT MULTIFUNCTIONAL

## (undated) DEVICE — ELECTRODE BLDE L6.5IN CAUT EXT DISP

## (undated) DEVICE — HYPODERMIC SAFETY NEEDLE: Brand: MAGELLAN

## (undated) DEVICE — 30977 SEE SHARP - ENHANCED INTRAOPERATIVE LAPAROSCOPE CLEANING & DEFOGGING: Brand: 30977 SEE SHARP - ENHANCED INTRAOPERATIVE LAPAROSCOPE CLEANING & DEFOGGING

## (undated) DEVICE — STAPLER INT L60MM DIA12MM STD TISS TI LNAR CUT LN 6 ROW

## (undated) DEVICE — TROCAR: Brand: KII SLEEVE

## (undated) DEVICE — TISSUE RETRIEVAL SYSTEM: Brand: INZII RETRIEVAL SYSTEM

## (undated) DEVICE — GLOVE SURG SZ 8 L12IN FNGR THK79MIL GRN LTX FREE

## (undated) DEVICE — PAD PT POS 36 IN SURGYPAD DISP

## (undated) DEVICE — TROCAR: Brand: KII SHIELDED BLADED ACCESS SYSTEM

## (undated) DEVICE — YANKAUER,BULB TIP,W/O VENT,RIGID,STERILE: Brand: MEDLINE

## (undated) DEVICE — SET INSUF TUBE HEAT ISO CONN DISP

## (undated) DEVICE — LEGGINGS, PAIR, CLEAR, STERILE: Brand: MEDLINE

## (undated) DEVICE — GENERAL LAPAROSCOPY: Brand: MEDLINE INDUSTRIES, INC.

## (undated) DEVICE — TOTAL TRAY, 16FR 10ML SIL FOLEY, URN: Brand: MEDLINE

## (undated) DEVICE — ENDOSCOPY KIT: Brand: MEDLINE INDUSTRIES, INC.

## (undated) DEVICE — DRAPE,MINOR PROC,6X6 FEN, STER: Brand: MEDLINE

## (undated) DEVICE — SUTURE VCRL + SZ 2-0 L27IN ABSRB CLR CT-1 1/2 CIR TAPERCUT VCP259H

## (undated) DEVICE — SUTURE VCRL + SZ 3-0 L18IN ABSRB UD SH 1/2 CIR TAPERCUT NDL VCP864D

## (undated) DEVICE — ACCESS PLATFORM FOR MINIMALLY INVASIVE SURGERY.: Brand: GELPORT® LAPAROSCOPIC  SYSTEM

## (undated) DEVICE — COVER LT HNDL BLU PLAS

## (undated) DEVICE — SOLUTION IRRIG 3000ML 0.9% SOD CHL USP UROMATIC PLAS CONT

## (undated) DEVICE — SUTURE PERMAHAND SZ 2-0 L18IN NONABSORBABLE BLK L26MM SH C012D

## (undated) DEVICE — COVER,TABLE,77X90,STERILE: Brand: MEDLINE

## (undated) DEVICE — GOWN,AURORA,NONREINF,RAGLAN,XXL,STERILE: Brand: MEDLINE

## (undated) DEVICE — SHEET,DRAPE,53X77,STERILE: Brand: MEDLINE

## (undated) DEVICE — GARMENT COMPR STD FOR 17IN CALF UNIF THER FLOTRN

## (undated) DEVICE — 3M™ IOBAN™ 2 ANTIMICROBIAL INCISE DRAPE 6650EZ: Brand: IOBAN™ 2

## (undated) DEVICE — WOUND RETRACTOR AND PROTECTOR: Brand: ALEXIS WOUND PROTECTOR-RETRACTOR

## (undated) DEVICE — TOWEL,OR,DSP,ST,BLUE,STD,4/PK,20PK/CS: Brand: MEDLINE

## (undated) DEVICE — SYRINGE IRRIG 60ML SFT PLIABLE BLB EZ TO GRP 1 HND USE W/

## (undated) DEVICE — GOWN SIRUS NONREIN XL W/TWL: Brand: MEDLINE INDUSTRIES, INC.

## (undated) DEVICE — NEEDLE INJ 25GA P5MM SHFT L230CM SHTH DIA2.5MM S STL TEF

## (undated) DEVICE — SOLUTION IV IRRIG POUR BRL 0.9% SODIUM CHL 2F7124

## (undated) DEVICE — STAPLER SKIN H3.9MM WIRE DIA0.58MM CRWN 6.9MM 35 STPL FIX

## (undated) DEVICE — SYSTEM SKIN CLSR 60CM 2-OCTYL CYNOACRLT W/ MESH DISPNS

## (undated) DEVICE — RELOAD STPL L45MM H2-4.1MM THCK TISS GRN GRIPPING SURF B

## (undated) DEVICE — TUBING, SUCTION, 1/4" X 12', STRAIGHT: Brand: MEDLINE

## (undated) DEVICE — SPONGE GZ W4XL4IN COT 12 PLY TYP VII WVN C FLD DSGN

## (undated) DEVICE — RELOAD STPL L60MM H1.5-3.6MM REG TISS BLU GRIPPING SURF B

## (undated) DEVICE — SYRINGE 20ML LL S/C 50

## (undated) DEVICE — INSUFFLATION NEEDLE TO ESTABLISH PNEUMOPERITONEUM.: Brand: INSUFFLATION NEEDLE

## (undated) DEVICE — SPONGE LAP W18XL18IN WHT COT 4 PLY FLD STRUNG RADPQ DISP ST

## (undated) DEVICE — STERILE SURGICAL LUBRICANT, METAL TUBE: Brand: SURGILUBE

## (undated) DEVICE — COUNTER NDL 40 COUNT HLD 70 NUM FOAM BLK SGL MAG W BLDE REMV

## (undated) DEVICE — GLOVE ORANGE PI 7 1/2   MSG9075

## (undated) DEVICE — Device

## (undated) DEVICE — PENCIL ES L3M BTTN SWCH S STL HEX LOK BLDE ELECTRD HOLSTER

## (undated) DEVICE — FORCEPS BX 240CM 2.4MM L NDL RAD JAW 4 M00513334